# Patient Record
Sex: MALE | Race: WHITE | NOT HISPANIC OR LATINO | Employment: OTHER | URBAN - METROPOLITAN AREA
[De-identification: names, ages, dates, MRNs, and addresses within clinical notes are randomized per-mention and may not be internally consistent; named-entity substitution may affect disease eponyms.]

---

## 2024-01-02 ENCOUNTER — HOSPITAL ENCOUNTER (INPATIENT)
Facility: HOSPITAL | Age: 89
LOS: 9 days | Discharge: HOSPICE | DRG: 698 | End: 2024-01-11
Attending: EMERGENCY MEDICINE | Admitting: EMERGENCY MEDICINE
Payer: MEDICARE

## 2024-01-02 DIAGNOSIS — J13 PNEUMONIA OF RIGHT LUNG DUE TO STREPTOCOCCUS PNEUMONIAE, UNSPECIFIED PART OF LUNG: ICD-10-CM

## 2024-01-02 DIAGNOSIS — R78.81 BACTEREMIA: ICD-10-CM

## 2024-01-02 DIAGNOSIS — R06.02 SOB (SHORTNESS OF BREATH): ICD-10-CM

## 2024-01-02 DIAGNOSIS — J90 PLEURAL EFFUSION: ICD-10-CM

## 2024-01-02 DIAGNOSIS — R78.81 BACTEREMIA DUE TO STREPTOCOCCUS PNEUMONIAE: ICD-10-CM

## 2024-01-02 DIAGNOSIS — I25.10 CAD (CORONARY ARTERY DISEASE): ICD-10-CM

## 2024-01-02 DIAGNOSIS — R07.9 CHEST PAIN: ICD-10-CM

## 2024-01-02 DIAGNOSIS — R57.9 SHOCK: Primary | ICD-10-CM

## 2024-01-02 DIAGNOSIS — Z91.89 AT HIGH RISK FOR FLUID OVERLOAD: ICD-10-CM

## 2024-01-02 DIAGNOSIS — B95.3 BACTEREMIA DUE TO STREPTOCOCCUS PNEUMONIAE: ICD-10-CM

## 2024-01-02 DIAGNOSIS — I50.9 ACUTE ON CHRONIC CONGESTIVE HEART FAILURE, UNSPECIFIED HEART FAILURE TYPE: ICD-10-CM

## 2024-01-02 DIAGNOSIS — R07.9 CHEST PAIN, UNSPECIFIED TYPE: ICD-10-CM

## 2024-01-02 DIAGNOSIS — J96.01 ACUTE RESPIRATORY FAILURE WITH HYPOXIA: ICD-10-CM

## 2024-01-02 DIAGNOSIS — R41.82 ALTERED MENTAL STATE: ICD-10-CM

## 2024-01-02 DIAGNOSIS — I50.9 ACUTE EXACERBATION OF CHF (CONGESTIVE HEART FAILURE): ICD-10-CM

## 2024-01-02 DIAGNOSIS — R00.0 TACHYCARDIA: ICD-10-CM

## 2024-01-02 DIAGNOSIS — I48.91 ATRIAL FIBRILLATION: ICD-10-CM

## 2024-01-02 DIAGNOSIS — J44.9 CHRONIC OBSTRUCTIVE PULMONARY DISEASE, UNSPECIFIED COPD TYPE: ICD-10-CM

## 2024-01-02 LAB
ABORH RETYPE: NORMAL
ABS NEUT (OLG): 10.56 X10(3)/MCL (ref 2.1–9.2)
ABS NEUT (OLG): 5.75 X10(3)/MCL (ref 2.1–9.2)
ACINETOBACTER CALCOACETICUS-BAUMANNII COMPLEX (OHS): NOT DETECTED
ALBUMIN SERPL-MCNC: 2.2 G/DL (ref 3.4–4.8)
ALBUMIN SERPL-MCNC: 2.2 G/DL (ref 3.4–4.8)
ALBUMIN SERPL-MCNC: 2.5 G/DL (ref 3.4–4.8)
ALBUMIN/GLOB SERPL: 0.7 RATIO (ref 1.1–2)
ALBUMIN/GLOB SERPL: 0.8 RATIO (ref 1.1–2)
ALLENS TEST BLOOD GAS (OHS): YES
ALLENS TEST BLOOD GAS (OHS): YES
ALP SERPL-CCNC: 34 UNIT/L (ref 40–150)
ALP SERPL-CCNC: 36 UNIT/L (ref 40–150)
ALT SERPL-CCNC: 20 UNIT/L (ref 0–55)
ALT SERPL-CCNC: 21 UNIT/L (ref 0–55)
APPEARANCE UR: ABNORMAL
APTT PPP: 35.8 SECONDS (ref 23.2–33.7)
AST SERPL-CCNC: 33 UNIT/L (ref 5–34)
AST SERPL-CCNC: 37 UNIT/L (ref 5–34)
AV INDEX (PROSTH): 0.39
AV MEAN GRADIENT: 18 MMHG
AV PEAK GRADIENT: 28 MMHG
AV VALVE AREA BY VELOCITY RATIO: 1.22 CM²
AV VALVE AREA: 1.23 CM²
AV VELOCITY RATIO: 0.39
BACTERIA #/AREA URNS AUTO: ABNORMAL /HPF
BACTEROIDES FRAGILIS (OHS): NOT DETECTED
BASE EXCESS BLD CALC-SCNC: -7.6 MMOL/L (ref -2–2)
BASE EXCESS BLD CALC-SCNC: -9.7 MMOL/L (ref -2–2)
BASOPHILS NFR BLD MANUAL: 0.14 X10(3)/MCL (ref 0–0.2)
BASOPHILS NFR BLD MANUAL: 1 %
BILIRUB SERPL-MCNC: 0.4 MG/DL
BILIRUB SERPL-MCNC: 0.7 MG/DL
BILIRUB UR QL STRIP.AUTO: NEGATIVE
BLOOD GAS SAMPLE TYPE (OHS): ABNORMAL
BLOOD GAS SAMPLE TYPE (OHS): ABNORMAL
BNP BLD-MCNC: 1970.5 PG/ML
BSA FOR ECHO PROCEDURE: 1.88 M2
BUN SERPL-MCNC: 80.4 MG/DL (ref 8.4–25.7)
BUN SERPL-MCNC: 80.9 MG/DL (ref 8.4–25.7)
BUN SERPL-MCNC: 83.4 MG/DL (ref 8.4–25.7)
BURR CELLS (OLG): ABNORMAL
BURR CELLS (OLG): SLIGHT
C AURIS DNA BLD POS QL NAA+NON-PROBE: NOT DETECTED
C GATTII+NEOFOR DNA CSF QL NAA+NON-PROBE: NOT DETECTED
CA-I BLD-SCNC: 1.09 MMOL/L (ref 1.12–1.23)
CA-I BLD-SCNC: 1.17 MMOL/L (ref 1.12–1.23)
CALCIUM SERPL-MCNC: 8.1 MG/DL (ref 8.8–10)
CALCIUM SERPL-MCNC: 8.4 MG/DL (ref 8.8–10)
CALCIUM SERPL-MCNC: 8.5 MG/DL (ref 8.8–10)
CANDIDA ALBICANS (OHS): NOT DETECTED
CANDIDA GLABRATA (OHS): NOT DETECTED
CANDIDA KRUSEI (OHS): NOT DETECTED
CANDIDA PARAPSILOSIS (OHS): NOT DETECTED
CANDIDA TROPICALIS (OHS): NOT DETECTED
CHLORIDE SERPL-SCNC: 113 MMOL/L (ref 98–111)
CHLORIDE SERPL-SCNC: 114 MMOL/L (ref 98–111)
CHLORIDE SERPL-SCNC: 115 MMOL/L (ref 98–111)
CO2 BLDA-SCNC: 16.7 MMOL/L
CO2 BLDA-SCNC: 18.3 MMOL/L
CO2 SERPL-SCNC: 12 MMOL/L (ref 23–31)
CO2 SERPL-SCNC: 14 MMOL/L (ref 23–31)
CO2 SERPL-SCNC: 16 MMOL/L (ref 23–31)
COHGB MFR BLDA: 0.5 % (ref 0.5–1.5)
COHGB MFR BLDA: 2.7 % (ref 0.5–1.5)
COLOR UR AUTO: YELLOW
CREAT SERPL-MCNC: 2.65 MG/DL (ref 0.73–1.18)
CREAT SERPL-MCNC: 2.83 MG/DL (ref 0.73–1.18)
CREAT SERPL-MCNC: 3.1 MG/DL (ref 0.73–1.18)
CTX-M (OHS): ABNORMAL
CV ECHO LV RWT: 0.5 CM
DOP CALC AO PEAK VEL: 2.63 M/S
DOP CALC AO VTI: 53.2 CM
DOP CALC LVOT AREA: 3.1 CM2
DOP CALC LVOT DIAMETER: 2 CM
DOP CALC LVOT PEAK VEL: 1.02 M/S
DOP CALC LVOT STROKE VOLUME: 65.31 CM3
DOP CALC MV VTI: 35.8 CM
DOP CALCLVOT PEAK VEL VTI: 20.8 CM
DRAWN BY BLOOD GAS (OHS): ABNORMAL
DRAWN BY BLOOD GAS (OHS): ABNORMAL
E WAVE DECELERATION TIME: 301 MSEC
E/A RATIO: 0.87
E/E' RATIO: 16.82 M/S
ECHO LV POSTERIOR WALL: 1.07 CM (ref 0.6–1.1)
ENTEROBACTER CLOACAE COMPLEX (OHS): NOT DETECTED
ENTEROBACTERALES (OHS): NOT DETECTED
ENTEROCOCCUS FAECALIS (OHS): NOT DETECTED
ENTEROCOCCUS FAECIUM (OHS): NOT DETECTED
ERYTHROCYTE [DISTWIDTH] IN BLOOD BY AUTOMATED COUNT: 16 % (ref 11.5–17)
ERYTHROCYTE [DISTWIDTH] IN BLOOD BY AUTOMATED COUNT: 16.1 % (ref 11.5–17)
ESCHERICHIA COLI (OHS): NOT DETECTED
FLUAV AG UPPER RESP QL IA.RAPID: NOT DETECTED
FLUBV AG UPPER RESP QL IA.RAPID: NOT DETECTED
FRACTIONAL SHORTENING: 34 % (ref 28–44)
GFR SERPLBLD CREATININE-BSD FMLA CKD-EPI: 18 MLS/MIN/1.73/M2
GFR SERPLBLD CREATININE-BSD FMLA CKD-EPI: 20 MLS/MIN/1.73/M2
GFR SERPLBLD CREATININE-BSD FMLA CKD-EPI: 22 MLS/MIN/1.73/M2
GLOBULIN SER-MCNC: 3 GM/DL (ref 2.4–3.5)
GLOBULIN SER-MCNC: 3.2 GM/DL (ref 2.4–3.5)
GLUCOSE SERPL-MCNC: 113 MG/DL (ref 75–121)
GLUCOSE SERPL-MCNC: 73 MG/DL (ref 75–121)
GLUCOSE SERPL-MCNC: 84 MG/DL (ref 75–121)
GLUCOSE UR QL STRIP.AUTO: NORMAL
GP B STREP DNA CSF QL NAA+NON-PROBE: NOT DETECTED
GROUP & RH: NORMAL
HAEM INFLU DNA CSF QL NAA+NON-PROBE: NOT DETECTED
HCO3 BLDA-SCNC: 15.7 MMOL/L (ref 22–26)
HCO3 BLDA-SCNC: 17.3 MMOL/L (ref 22–26)
HCT VFR BLD AUTO: 36 % (ref 42–52)
HCT VFR BLD AUTO: 37.2 % (ref 42–52)
HCT VFR BLD AUTO: 37.4 % (ref 42–52)
HEMATOLOGIST REVIEW: NORMAL
HGB BLD-MCNC: 11 G/DL (ref 14–18)
HGB BLD-MCNC: 11.4 G/DL (ref 14–18)
HGB BLD-MCNC: 11.5 G/DL (ref 14–18)
IMP (OHS): ABNORMAL
INDIRECT COOMBS: NORMAL
INR PPP: 1.8
INSTRUMENT WBC (OLG): 13.89 X10(3)/MCL
INSTRUMENT WBC (OLG): 7.77 X10(3)/MCL
INTERVENTRICULAR SEPTUM: 1.04 CM (ref 0.6–1.1)
KETONES UR QL STRIP.AUTO: NEGATIVE
KLEBSIELLA AEROGENES (OHS): NOT DETECTED
KLEBSIELLA OXYTOCA (OHS): NOT DETECTED
KLEBSIELLA PNEUMONIAE GROUP (OHS): NOT DETECTED
KPC (OHS): ABNORMAL
L MONOCYTOG DNA CSF QL NAA+NON-PROBE: NOT DETECTED
LACTATE SERPL-SCNC: 2.9 MMOL/L (ref 0.5–2.2)
LACTATE SERPL-SCNC: 3.3 MMOL/L (ref 0.5–2.2)
LACTATE SERPL-SCNC: 3.6 MMOL/L (ref 0.5–2.2)
LACTATE SERPL-SCNC: 4.1 MMOL/L (ref 0.5–2.2)
LEFT ATRIUM SIZE: 3.7 CM
LEFT ATRIUM VOLUME INDEX MOD: 30.9 ML/M2
LEFT ATRIUM VOLUME MOD: 57.2 CM3
LEFT INTERNAL DIMENSION IN SYSTOLE: 2.82 CM (ref 2.1–4)
LEFT VENTRICLE DIASTOLIC VOLUME INDEX: 44.92 ML/M2
LEFT VENTRICLE DIASTOLIC VOLUME: 83.1 ML
LEFT VENTRICLE MASS INDEX: 83 G/M2
LEFT VENTRICLE SYSTOLIC VOLUME INDEX: 16.3 ML/M2
LEFT VENTRICLE SYSTOLIC VOLUME: 30.1 ML
LEFT VENTRICULAR INTERNAL DIMENSION IN DIASTOLE: 4.3 CM (ref 3.5–6)
LEFT VENTRICULAR MASS: 153.58 G
LEUKOCYTE ESTERASE UR QL STRIP.AUTO: 500
LPM (OHS): 10
LPM (OHS): 3.5
LV LATERAL E/E' RATIO: 15.89 M/S
LV SEPTAL E/E' RATIO: 17.88 M/S
LVOT MG: 2 MMHG
LVOT MV: 0.74 CM/S
LYMPHOCYTES NFR BLD MANUAL: 0.7 X10(3)/MCL
LYMPHOCYTES NFR BLD MANUAL: 1.11 X10(3)/MCL
LYMPHOCYTES NFR BLD MANUAL: 8 %
LYMPHOCYTES NFR BLD MANUAL: 9 %
MAGNESIUM SERPL-MCNC: 1.5 MG/DL (ref 1.6–2.6)
MAGNESIUM SERPL-MCNC: 2 MG/DL (ref 1.6–2.6)
MCH RBC QN AUTO: 27.2 PG (ref 27–31)
MCH RBC QN AUTO: 27.2 PG (ref 27–31)
MCHC RBC AUTO-ENTMCNC: 30.5 G/DL (ref 33–36)
MCHC RBC AUTO-ENTMCNC: 30.6 G/DL (ref 33–36)
MCR-1 (OHS): ABNORMAL
MCV RBC AUTO: 89.1 FL (ref 80–94)
MCV RBC AUTO: 89.3 FL (ref 80–94)
MECA/C (OHS): ABNORMAL
MECA/C AND MREJ (MRSA)(OHS): ABNORMAL
METAMYELOCYTES NFR BLD MANUAL: 16 %
METAMYELOCYTES NFR BLD MANUAL: 7 %
METHGB MFR BLDA: 0 % (ref 0.4–1.5)
METHGB MFR BLDA: 0 % (ref 0.4–1.5)
MRSA PCR SCRN (OHS): NOT DETECTED
MUCOUS THREADS URNS QL MICRO: ABNORMAL /LPF
MV MEAN GRADIENT: 6 MMHG
MV PEAK A VEL: 1.64 M/S
MV PEAK E VEL: 1.43 M/S
MV PEAK GRADIENT: 12 MMHG
MV VALVE AREA BY CONTINUITY EQUATION: 1.82 CM2
MYELOCYTES NFR BLD MANUAL: 1 %
MYELOCYTES NFR BLD MANUAL: 7 %
N MEN DNA CSF QL NAA+NON-PROBE: NOT DETECTED
NDM (OHS): ABNORMAL
NEUTROPHILS NFR BLD MANUAL: 74 %
NEUTROPHILS NFR BLD MANUAL: 76 %
NITRITE UR QL STRIP.AUTO: NEGATIVE
NRBC BLD AUTO-RTO: 0 %
NRBC BLD AUTO-RTO: 0.5 %
O2 HB BLOOD GAS (OHS): 91 % (ref 94–97)
O2 HB BLOOD GAS (OHS): 91.3 % (ref 94–97)
OHS LV EJECTION FRACTION SIMPSONS BIPLANE MOD: 60 %
OXA-48-LIKE (OHS): ABNORMAL
OXYGEN DEVICE BLOOD GAS (OHS): ABNORMAL
OXYGEN DEVICE BLOOD GAS (OHS): ABNORMAL
OXYHGB MFR BLDA: 11.1 G/DL (ref 12–16)
OXYHGB MFR BLDA: 9.7 G/DL (ref 12–16)
PCO2 BLDA: 32 MMHG (ref 35–45)
PCO2 BLDA: 32 MMHG (ref 35–45)
PH BLDA: 7.3 [PH] (ref 7.35–7.45)
PH BLDA: 7.34 [PH] (ref 7.35–7.45)
PH UR STRIP.AUTO: 5 [PH]
PHOSPHATE SERPL-MCNC: 3.4 MG/DL (ref 2.3–4.7)
PHOSPHATE SERPL-MCNC: 3.6 MG/DL (ref 2.3–4.7)
PISA TR MAX VEL: 3.08 M/S
PLATELET # BLD AUTO: 167 X10(3)/MCL (ref 130–400)
PLATELET # BLD AUTO: 194 X10(3)/MCL (ref 130–400)
PLATELET # BLD EST: NORMAL 10*3/UL
PLATELET # BLD EST: NORMAL 10*3/UL
PMV BLD AUTO: 11.4 FL (ref 7.4–10.4)
PMV BLD AUTO: 11.6 FL (ref 7.4–10.4)
PO2 BLDA: 58 MMHG (ref 80–100)
PO2 BLDA: 63 MMHG (ref 80–100)
POIKILOCYTOSIS BLD QL SMEAR: ABNORMAL
POIKILOCYTOSIS BLD QL SMEAR: SLIGHT
POTASSIUM BLOOD GAS (OHS): 4.9 MMOL/L (ref 3.5–5)
POTASSIUM BLOOD GAS (OHS): 5.2 MMOL/L (ref 3.5–5)
POTASSIUM SERPL-SCNC: 4.9 MMOL/L (ref 3.5–5.1)
POTASSIUM SERPL-SCNC: 5.5 MMOL/L (ref 3.5–5.1)
POTASSIUM SERPL-SCNC: 5.7 MMOL/L (ref 3.5–5.1)
PROT SERPL-MCNC: 5.2 GM/DL (ref 5.8–7.6)
PROT SERPL-MCNC: 5.7 GM/DL (ref 5.8–7.6)
PROT UR QL STRIP.AUTO: ABNORMAL
PROTEUS SPP. (OHS): NOT DETECTED
PROTHROMBIN TIME: 20.7 SECONDS (ref 12.5–14.5)
PSEUDOMONAS AERUGINOSA (OHS): NOT DETECTED
RBC # BLD AUTO: 4.04 X10(6)/MCL (ref 4.7–6.1)
RBC # BLD AUTO: 4.19 X10(6)/MCL (ref 4.7–6.1)
RBC #/AREA URNS AUTO: ABNORMAL /HPF
RBC MORPH BLD: ABNORMAL
RBC MORPH BLD: ABNORMAL
RBC UR QL AUTO: ABNORMAL
RIGHT VENTRICULAR END-DIASTOLIC DIMENSION: 3.27 CM
RSV A 5' UTR RNA NPH QL NAA+PROBE: NOT DETECTED
S ENT+BONG DNA STL QL NAA+NON-PROBE: NOT DETECTED
S PNEUM DNA CSF QL NAA+NON-PROBE: DETECTED
SAMPLE SITE BLOOD GAS (OHS): ABNORMAL
SAMPLE SITE BLOOD GAS (OHS): ABNORMAL
SAO2 % BLDA: 87.8 %
SAO2 % BLDA: 89.1 %
SARS-COV-2 RNA RESP QL NAA+PROBE: NOT DETECTED
SERRATIA MARCESCENS (OHS): NOT DETECTED
SODIUM BLOOD GAS (OHS): 135 MMOL/L (ref 137–145)
SODIUM BLOOD GAS (OHS): 136 MMOL/L (ref 137–145)
SODIUM SERPL-SCNC: 141 MMOL/L (ref 132–146)
SODIUM SERPL-SCNC: 142 MMOL/L (ref 132–146)
SODIUM SERPL-SCNC: 143 MMOL/L (ref 132–146)
SP GR UR STRIP.AUTO: 1.02 (ref 1–1.03)
SPECIMEN OUTDATE: NORMAL
SQUAMOUS #/AREA URNS LPF: ABNORMAL /HPF
STAPHYLOCOCCUS AUREUS (OHS): NOT DETECTED
STAPHYLOCOCCUS EPIDERMIDIS (OHS): NOT DETECTED
STAPHYLOCOCCUS LUGDUNENSIS (OHS): NOT DETECTED
STAPHYLOCOCCUS SPP. (OHS): NOT DETECTED
STENOTROPHOMONAS MALTOPHILIA (OHS): NOT DETECTED
STREPTOCOCCUS PYOGENES (GROUP A)(OHS): NOT DETECTED
STREPTOCOCCUS SPP. (OHS): DETECTED
TDI LATERAL: 0.09 M/S
TDI SEPTAL: 0.08 M/S
TDI: 0.09 M/S
TR MAX PG: 38 MMHG
TRICUSPID ANNULAR PLANE SYSTOLIC EXCURSION: 2.75 CM
TROPONIN I SERPL-MCNC: 0.24 NG/ML (ref 0–0.04)
TROPONIN I SERPL-MCNC: 0.25 NG/ML (ref 0–0.04)
TROPONIN I SERPL-MCNC: 0.29 NG/ML (ref 0–0.04)
UROBILINOGEN UR STRIP-ACNC: NORMAL
VANA/B (OHS): ABNORMAL
VIM (OHS): ABNORMAL
WBC # SPEC AUTO: 13.89 X10(3)/MCL (ref 4.5–11.5)
WBC # SPEC AUTO: 7.77 X10(3)/MCL (ref 4.5–11.5)
WBC #/AREA URNS AUTO: ABNORMAL /HPF
WBC CLUMPS UR QL AUTO: ABNORMAL
Z-SCORE OF LEFT VENTRICULAR DIMENSION IN END DIASTOLE: -1.78
Z-SCORE OF LEFT VENTRICULAR DIMENSION IN END SYSTOLE: -0.93

## 2024-01-02 PROCEDURE — 83605 ASSAY OF LACTIC ACID: CPT

## 2024-01-02 PROCEDURE — 25000003 PHARM REV CODE 250: Performed by: EMERGENCY MEDICINE

## 2024-01-02 PROCEDURE — 36600 WITHDRAWAL OF ARTERIAL BLOOD: CPT

## 2024-01-02 PROCEDURE — 84100 ASSAY OF PHOSPHORUS: CPT | Performed by: STUDENT IN AN ORGANIZED HEALTH CARE EDUCATION/TRAINING PROGRAM

## 2024-01-02 PROCEDURE — 99900035 HC TECH TIME PER 15 MIN (STAT)

## 2024-01-02 PROCEDURE — 83735 ASSAY OF MAGNESIUM: CPT | Performed by: STUDENT IN AN ORGANIZED HEALTH CARE EDUCATION/TRAINING PROGRAM

## 2024-01-02 PROCEDURE — 85027 COMPLETE CBC AUTOMATED: CPT

## 2024-01-02 PROCEDURE — 94640 AIRWAY INHALATION TREATMENT: CPT

## 2024-01-02 PROCEDURE — 63600175 PHARM REV CODE 636 W HCPCS: Performed by: NURSE PRACTITIONER

## 2024-01-02 PROCEDURE — 25000003 PHARM REV CODE 250: Performed by: INTERNAL MEDICINE

## 2024-01-02 PROCEDURE — 25000003 PHARM REV CODE 250: Performed by: STUDENT IN AN ORGANIZED HEALTH CARE EDUCATION/TRAINING PROGRAM

## 2024-01-02 PROCEDURE — 86850 RBC ANTIBODY SCREEN: CPT

## 2024-01-02 PROCEDURE — 83735 ASSAY OF MAGNESIUM: CPT

## 2024-01-02 PROCEDURE — 27000221 HC OXYGEN, UP TO 24 HOURS

## 2024-01-02 PROCEDURE — 87077 CULTURE AEROBIC IDENTIFY: CPT

## 2024-01-02 PROCEDURE — 83880 ASSAY OF NATRIURETIC PEPTIDE: CPT | Performed by: EMERGENCY MEDICINE

## 2024-01-02 PROCEDURE — 84484 ASSAY OF TROPONIN QUANT: CPT

## 2024-01-02 PROCEDURE — 85027 COMPLETE CBC AUTOMATED: CPT | Performed by: STUDENT IN AN ORGANIZED HEALTH CARE EDUCATION/TRAINING PROGRAM

## 2024-01-02 PROCEDURE — 93005 ELECTROCARDIOGRAM TRACING: CPT | Performed by: INTERNAL MEDICINE

## 2024-01-02 PROCEDURE — 99285 EMERGENCY DEPT VISIT HI MDM: CPT | Mod: 25

## 2024-01-02 PROCEDURE — 25000242 PHARM REV CODE 250 ALT 637 W/ HCPCS

## 2024-01-02 PROCEDURE — 85007 BL SMEAR W/DIFF WBC COUNT: CPT | Performed by: STUDENT IN AN ORGANIZED HEALTH CARE EDUCATION/TRAINING PROGRAM

## 2024-01-02 PROCEDURE — 87086 URINE CULTURE/COLONY COUNT: CPT

## 2024-01-02 PROCEDURE — 63600175 PHARM REV CODE 636 W HCPCS: Performed by: INTERNAL MEDICINE

## 2024-01-02 PROCEDURE — 85730 THROMBOPLASTIN TIME PARTIAL: CPT

## 2024-01-02 PROCEDURE — 85610 PROTHROMBIN TIME: CPT

## 2024-01-02 PROCEDURE — 80053 COMPREHEN METABOLIC PANEL: CPT | Performed by: STUDENT IN AN ORGANIZED HEALTH CARE EDUCATION/TRAINING PROGRAM

## 2024-01-02 PROCEDURE — 25000003 PHARM REV CODE 250

## 2024-01-02 PROCEDURE — 93010 ELECTROCARDIOGRAM REPORT: CPT | Mod: ,,, | Performed by: INTERNAL MEDICINE

## 2024-01-02 PROCEDURE — 87154 CUL TYP ID BLD PTHGN 6+ TRGT: CPT

## 2024-01-02 PROCEDURE — 25000242 PHARM REV CODE 250 ALT 637 W/ HCPCS: Performed by: EMERGENCY MEDICINE

## 2024-01-02 PROCEDURE — 25000003 PHARM REV CODE 250: Performed by: NURSE PRACTITIONER

## 2024-01-02 PROCEDURE — 96374 THER/PROPH/DIAG INJ IV PUSH: CPT

## 2024-01-02 PROCEDURE — 80069 RENAL FUNCTION PANEL: CPT | Performed by: STUDENT IN AN ORGANIZED HEALTH CARE EDUCATION/TRAINING PROGRAM

## 2024-01-02 PROCEDURE — 96375 TX/PRO/DX INJ NEW DRUG ADDON: CPT

## 2024-01-02 PROCEDURE — 82803 BLOOD GASES ANY COMBINATION: CPT

## 2024-01-02 PROCEDURE — 85018 HEMOGLOBIN: CPT | Performed by: STUDENT IN AN ORGANIZED HEALTH CARE EDUCATION/TRAINING PROGRAM

## 2024-01-02 PROCEDURE — 83605 ASSAY OF LACTIC ACID: CPT | Performed by: STUDENT IN AN ORGANIZED HEALTH CARE EDUCATION/TRAINING PROGRAM

## 2024-01-02 PROCEDURE — 84484 ASSAY OF TROPONIN QUANT: CPT | Performed by: NURSE PRACTITIONER

## 2024-01-02 PROCEDURE — 87641 MR-STAPH DNA AMP PROBE: CPT | Performed by: INTERNAL MEDICINE

## 2024-01-02 PROCEDURE — 81001 URINALYSIS AUTO W/SCOPE: CPT

## 2024-01-02 PROCEDURE — 25000003 PHARM REV CODE 250: Performed by: PHYSICIAN ASSISTANT

## 2024-01-02 PROCEDURE — 96361 HYDRATE IV INFUSION ADD-ON: CPT

## 2024-01-02 PROCEDURE — 63600175 PHARM REV CODE 636 W HCPCS

## 2024-01-02 PROCEDURE — 80053 COMPREHEN METABOLIC PANEL: CPT

## 2024-01-02 PROCEDURE — 20000000 HC ICU ROOM

## 2024-01-02 PROCEDURE — 63600175 PHARM REV CODE 636 W HCPCS: Performed by: STUDENT IN AN ORGANIZED HEALTH CARE EDUCATION/TRAINING PROGRAM

## 2024-01-02 PROCEDURE — 93005 ELECTROCARDIOGRAM TRACING: CPT

## 2024-01-02 PROCEDURE — 0241U COVID/RSV/FLU A&B PCR: CPT | Performed by: NURSE PRACTITIONER

## 2024-01-02 RX ORDER — LIDOCAINE HYDROCHLORIDE 10 MG/ML
5 INJECTION INFILTRATION; PERINEURAL ONCE
Status: DISCONTINUED | OUTPATIENT
Start: 2024-01-02 | End: 2024-01-02

## 2024-01-02 RX ORDER — ATORVASTATIN CALCIUM 10 MG/1
10 TABLET, FILM COATED ORAL DAILY
Status: ON HOLD | COMMUNITY
End: 2024-01-11 | Stop reason: HOSPADM

## 2024-01-02 RX ORDER — PROCHLORPERAZINE EDISYLATE 5 MG/ML
5 INJECTION INTRAMUSCULAR; INTRAVENOUS EVERY 6 HOURS PRN
Status: DISCONTINUED | OUTPATIENT
Start: 2024-01-02 | End: 2024-01-11 | Stop reason: HOSPADM

## 2024-01-02 RX ORDER — SODIUM CHLORIDE 9 MG/ML
INJECTION, SOLUTION INTRAVENOUS CONTINUOUS
Status: DISCONTINUED | OUTPATIENT
Start: 2024-01-02 | End: 2024-01-11 | Stop reason: HOSPADM

## 2024-01-02 RX ORDER — CALCITRIOL 0.25 UG/1
0.25 CAPSULE ORAL DAILY
COMMUNITY

## 2024-01-02 RX ORDER — ACETAMINOPHEN 500 MG
1000 TABLET ORAL EVERY 6 HOURS PRN
Status: DISCONTINUED | OUTPATIENT
Start: 2024-01-02 | End: 2024-01-11 | Stop reason: HOSPADM

## 2024-01-02 RX ORDER — NALOXONE HCL 0.4 MG/ML
0.02 VIAL (ML) INJECTION
Status: DISCONTINUED | OUTPATIENT
Start: 2024-01-02 | End: 2024-01-11 | Stop reason: HOSPADM

## 2024-01-02 RX ORDER — GABAPENTIN 100 MG/1
300 CAPSULE ORAL NIGHTLY
COMMUNITY

## 2024-01-02 RX ORDER — ALBUTEROL SULFATE 0.83 MG/ML
10 SOLUTION RESPIRATORY (INHALATION) CONTINUOUS
Status: DISCONTINUED | OUTPATIENT
Start: 2024-01-02 | End: 2024-01-11 | Stop reason: HOSPADM

## 2024-01-02 RX ORDER — IPRATROPIUM BROMIDE AND ALBUTEROL SULFATE 2.5; .5 MG/3ML; MG/3ML
3 SOLUTION RESPIRATORY (INHALATION)
Status: COMPLETED | OUTPATIENT
Start: 2024-01-02 | End: 2024-01-02

## 2024-01-02 RX ORDER — SODIUM CHLORIDE 9 MG/ML
INJECTION, SOLUTION INTRAVENOUS CONTINUOUS
Status: DISCONTINUED | OUTPATIENT
Start: 2024-01-02 | End: 2024-01-02

## 2024-01-02 RX ORDER — METOPROLOL TARTRATE 25 MG/1
25 TABLET, FILM COATED ORAL 2 TIMES DAILY
Status: DISCONTINUED | OUTPATIENT
Start: 2024-01-02 | End: 2024-01-03

## 2024-01-02 RX ORDER — NOREPINEPHRINE BITARTRATE/D5W 8 MG/250ML
0-3 PLASTIC BAG, INJECTION (ML) INTRAVENOUS CONTINUOUS
Status: DISCONTINUED | OUTPATIENT
Start: 2024-01-02 | End: 2024-01-11 | Stop reason: HOSPADM

## 2024-01-02 RX ORDER — SODIUM CHLORIDE 0.9 % (FLUSH) 0.9 %
10 SYRINGE (ML) INJECTION
Status: DISCONTINUED | OUTPATIENT
Start: 2024-01-02 | End: 2024-01-11 | Stop reason: HOSPADM

## 2024-01-02 RX ORDER — TALC
6 POWDER (GRAM) TOPICAL NIGHTLY PRN
Status: DISCONTINUED | OUTPATIENT
Start: 2024-01-02 | End: 2024-01-11 | Stop reason: HOSPADM

## 2024-01-02 RX ORDER — NOREPINEPHRINE BITARTRATE/D5W 8 MG/250ML
PLASTIC BAG, INJECTION (ML) INTRAVENOUS
Status: DISPENSED
Start: 2024-01-02 | End: 2024-01-03

## 2024-01-02 RX ORDER — INDOMETHACIN 25 MG/1
50 CAPSULE ORAL
Status: COMPLETED | OUTPATIENT
Start: 2024-01-02 | End: 2024-01-02

## 2024-01-02 RX ORDER — METOPROLOL TARTRATE 25 MG/1
25 TABLET, FILM COATED ORAL 2 TIMES DAILY
COMMUNITY

## 2024-01-02 RX ORDER — LIDOCAINE HYDROCHLORIDE 10 MG/ML
5 INJECTION INFILTRATION; PERINEURAL ONCE
Status: COMPLETED | OUTPATIENT
Start: 2024-01-02 | End: 2024-01-02

## 2024-01-02 RX ORDER — IPRATROPIUM BROMIDE AND ALBUTEROL SULFATE 2.5; .5 MG/3ML; MG/3ML
3 SOLUTION RESPIRATORY (INHALATION) EVERY 4 HOURS PRN
Status: DISCONTINUED | OUTPATIENT
Start: 2024-01-02 | End: 2024-01-11 | Stop reason: HOSPADM

## 2024-01-02 RX ORDER — FUROSEMIDE 10 MG/ML
40 INJECTION INTRAMUSCULAR; INTRAVENOUS ONCE
Status: COMPLETED | OUTPATIENT
Start: 2024-01-02 | End: 2024-01-02

## 2024-01-02 RX ORDER — MUPIROCIN 20 MG/G
OINTMENT TOPICAL 2 TIMES DAILY
Status: COMPLETED | OUTPATIENT
Start: 2024-01-02 | End: 2024-01-07

## 2024-01-02 RX ORDER — MAGNESIUM SULFATE HEPTAHYDRATE 40 MG/ML
2 INJECTION, SOLUTION INTRAVENOUS
Status: COMPLETED | OUTPATIENT
Start: 2024-01-02 | End: 2024-01-02

## 2024-01-02 RX ORDER — SIMETHICONE 80 MG
1 TABLET,CHEWABLE ORAL 4 TIMES DAILY PRN
Status: DISCONTINUED | OUTPATIENT
Start: 2024-01-02 | End: 2024-01-11 | Stop reason: HOSPADM

## 2024-01-02 RX ORDER — CALCITRIOL 0.25 UG/1
0.25 CAPSULE ORAL DAILY
Status: DISCONTINUED | OUTPATIENT
Start: 2024-01-03 | End: 2024-01-06

## 2024-01-02 RX ORDER — GABAPENTIN 300 MG/1
300 CAPSULE ORAL NIGHTLY
Status: DISCONTINUED | OUTPATIENT
Start: 2024-01-02 | End: 2024-01-11 | Stop reason: HOSPADM

## 2024-01-02 RX ORDER — ACETAMINOPHEN 325 MG/1
650 TABLET ORAL EVERY 4 HOURS PRN
Status: DISCONTINUED | OUTPATIENT
Start: 2024-01-02 | End: 2024-01-11 | Stop reason: HOSPADM

## 2024-01-02 RX ORDER — ATORVASTATIN CALCIUM 10 MG/1
10 TABLET, FILM COATED ORAL DAILY
Status: DISCONTINUED | OUTPATIENT
Start: 2024-01-03 | End: 2024-01-11 | Stop reason: HOSPADM

## 2024-01-02 RX ORDER — HEPARIN SODIUM 5000 [USP'U]/ML
5000 INJECTION, SOLUTION INTRAVENOUS; SUBCUTANEOUS EVERY 8 HOURS
Status: DISCONTINUED | OUTPATIENT
Start: 2024-01-02 | End: 2024-01-03

## 2024-01-02 RX ORDER — SODIUM CHLORIDE 9 MG/ML
1000 INJECTION, SOLUTION INTRAVENOUS
Status: DISCONTINUED | OUTPATIENT
Start: 2024-01-02 | End: 2024-01-02

## 2024-01-02 RX ORDER — ALBUTEROL SULFATE 0.83 MG/ML
2.5 SOLUTION RESPIRATORY (INHALATION) EVERY 4 HOURS PRN
Status: DISCONTINUED | OUTPATIENT
Start: 2024-01-02 | End: 2024-01-02

## 2024-01-02 RX ORDER — ONDANSETRON HYDROCHLORIDE 2 MG/ML
4 INJECTION, SOLUTION INTRAVENOUS EVERY 4 HOURS PRN
Status: DISCONTINUED | OUTPATIENT
Start: 2024-01-02 | End: 2024-01-11 | Stop reason: HOSPADM

## 2024-01-02 RX ADMIN — CEFEPIME 500 MG: 1 INJECTION, POWDER, FOR SOLUTION INTRAMUSCULAR; INTRAVENOUS at 11:01

## 2024-01-02 RX ADMIN — HEPARIN SODIUM 5000 UNITS: 5000 INJECTION, SOLUTION INTRAVENOUS; SUBCUTANEOUS at 09:01

## 2024-01-02 RX ADMIN — SODIUM CHLORIDE 1000 ML: 9 INJECTION, SOLUTION INTRAVENOUS at 10:01

## 2024-01-02 RX ADMIN — ALBUTEROL SULFATE 10 MG/HR: 2.5 SOLUTION RESPIRATORY (INHALATION) at 12:01

## 2024-01-02 RX ADMIN — SODIUM CHLORIDE: 9 INJECTION, SOLUTION INTRAVENOUS at 11:01

## 2024-01-02 RX ADMIN — NOREPINEPHRINE BITARTRATE 0.02 MCG/KG/MIN: 8 INJECTION, SOLUTION INTRAVENOUS at 08:01

## 2024-01-02 RX ADMIN — SODIUM CHLORIDE 500 ML: 9 INJECTION, SOLUTION INTRAVENOUS at 12:01

## 2024-01-02 RX ADMIN — MAGNESIUM SULFATE HEPTAHYDRATE 2 G: 40 INJECTION, SOLUTION INTRAVENOUS at 03:01

## 2024-01-02 RX ADMIN — SODIUM CHLORIDE: 9 INJECTION, SOLUTION INTRAVENOUS at 07:01

## 2024-01-02 RX ADMIN — FUROSEMIDE 40 MG: 10 INJECTION, SOLUTION INTRAVENOUS at 10:01

## 2024-01-02 RX ADMIN — ACETAMINOPHEN 1000 MG: 500 TABLET ORAL at 04:01

## 2024-01-02 RX ADMIN — AZITHROMYCIN MONOHYDRATE 500 MG: 500 INJECTION, POWDER, LYOPHILIZED, FOR SOLUTION INTRAVENOUS at 03:01

## 2024-01-02 RX ADMIN — FUROSEMIDE 40 MG: 10 INJECTION, SOLUTION INTRAMUSCULAR; INTRAVENOUS at 05:01

## 2024-01-02 RX ADMIN — SODIUM BICARBONATE 50 MEQ: 84 INJECTION, SOLUTION INTRAVENOUS at 12:01

## 2024-01-02 RX ADMIN — VANCOMYCIN HYDROCHLORIDE 1500 MG: 1.5 INJECTION, POWDER, LYOPHILIZED, FOR SOLUTION INTRAVENOUS at 11:01

## 2024-01-02 RX ADMIN — IPRATROPIUM BROMIDE AND ALBUTEROL SULFATE 3 ML: .5; 3 SOLUTION RESPIRATORY (INHALATION) at 12:01

## 2024-01-02 RX ADMIN — MUPIROCIN: 20 OINTMENT TOPICAL at 11:01

## 2024-01-02 RX ADMIN — LIDOCAINE HYDROCHLORIDE 5 ML: 10 INJECTION, SOLUTION INFILTRATION; PERINEURAL at 10:01

## 2024-01-02 RX ADMIN — CEFTRIAXONE SODIUM 2 G: 2 INJECTION, POWDER, FOR SOLUTION INTRAMUSCULAR; INTRAVENOUS at 01:01

## 2024-01-02 NOTE — CONSULTS
Inpatient consult to Cardiology  Consult performed by: Jeri Dunlap FNP  Consult ordered by: Mendoza Mckeon MD [facility] - Emergency Dept    Cardiology  Consult Note    Patient Name: Francis Montero  MRN: [id number]  Admission Date: 1/2/2024  Hospital Length of Stay: 0 days  Code Status: Full Code   Attending Provider: Fadumo Ramirez DO   Consulting Provider: NADIR Mcneil  Primary Care Physician: No primary care provider on file.  Principal Problem:<principal problem not specified>    Patient information was obtained from past medical records and ER records.     Subjective:   Reason for consult: elevated troponin    HPI:   Mr. Montero is a 89 y/o male,  unknown to [facility], who presented to Ed with c/o SOB. Admit VS- BP 70/30, P RA sat 85%. He was given 200ml NS enroute. In Ed he was given another 1.5L NS bolus with improvement in BP. Lab significant for WBC 13.89, K+ 5.7, CO2 16, BUN/creatinine 83.4/3.10, Mg 1.50, BNP 1970, troponin 0.236, Venous lactate 3.3. EKG SR with PACs. CXR consistent with pulmonary congestion and a large bilateral pleural effusions. Echo revealing EF 60-65% with mild to  moderate AS, mild MS and mild to moderate TR. Blood and urine cultures obtained and patient started on antibiotics. [facility] has been consulted for elevated troponin      PMH: Afib/eliquis, COPD, asbestosis, Merkel cell carcinoma of left ear anxiety, esophagitis, esophageal varices, hiatal hernia, merkel cell carcinoma, prostate cancer, chronic UTIs, HLD, PUD  PSH: surgical resection with adjuvant radiotherapy to Left ear,  EGD, prostate surgery,  open shoulder rotator cuff repair  Family History:   Social History: former  smoker 40 pack years,     Previous Cardiac Diagnostics:   TTE 01.2.24:  Left Ventricle: The left ventricle is normal in size. Normal wall thickness. Normal wall motion. There is normal systolic function with a visually estimated ejection fraction of 60 - 65%. Diastolic  function cannot be reliably determined in the presence of mitral valve disease. Inconclusive left ventricular filling pressure.    Right Ventricle: Normal right ventricular cavity size. Systolic function is normal.    Aortic Valve: There is mild to moderate stenosis. Aortic valve area by VTI is 1.23 cm². Aortic valve peak velocity is 2.63 m/s. Mean gradient is 18 mmHg. The dimensionless index is 0.39. There is trace aortic regurgitation.    Mitral Valve: There is severe mitral annular calcification present. There is mild stenosis. The mean pressure gradient across the mitral valve is 6 mmHg at a heart rate of  bpm.    Tricuspid Valve: There is mild to moderate regurgitation. The estimated PA systolic pressure is at least 38 mmHg.    TTE 02.22.22:   Left Ventricle: Left ventricle is normal in size and function. Normal   wall thickness.     Right Ventricle: Right ventricle is normal in size and function. Normal   wall thickness.     Mitral Valve: Mitral valve is normal in structure and function. Mildly   calcified leaflets.     Tricuspid Valve: Tricuspid valve is normal size and function.     Aortic Valve: Aortic valve is normal in structure and function.   Moderately calcified cusps.       Review of patient's allergies indicates:  No Known Allergies    No current facility-administered medications on file prior to encounter.     Current Outpatient Medications on File Prior to Encounter   Medication Sig    atorvastatin (LIPITOR) 10 MG tablet Take 10 mg by mouth once daily.    calcitRIOL (ROCALTROL) 0.25 MCG Cap Take 0.25 mcg by mouth once daily. Takes every other day    gabapentin (NEURONTIN) 100 MG capsule Take 300 mg by mouth every evening.    metoprolol tartrate (LOPRESSOR) 25 MG tablet Take 25 mg by mouth 2 (two) times daily.    NON FORMULARY MEDICATION Take 850 mg by mouth Daily. Umary     Family History    None       Tobacco Use    Smoking status: Not on file    Smokeless tobacco: Not on file   Substance and  Sexual Activity    Alcohol use: Not on file    Drug use: Not on file    Sexual activity: Not on file       Review of Systems   Respiratory:  Positive for cough and shortness of breath.    Cardiovascular:  Negative for chest pain and palpitations.   Gastrointestinal: Negative.        Objective:     Vital Signs (Most Recent):  Temp: 98.2 °F (36.8 °C) (01/02/24 0930)  Pulse: 108 (01/02/24 1415)  Resp: (!) 26 (01/02/24 1415)  BP: 112/69 (01/02/24 1415)  SpO2: (!) 93 % (01/02/24 1415) Vital Signs (24h Range):  Temp:  [98.2 °F (36.8 °C)] 98.2 °F (36.8 °C)  Pulse:  [] 108  Resp:  [18-30] 26  SpO2:  [86 %-100 %] 93 %  BP: ()/(44-87) 112/69     Weight: 77.1 kg (170 lb)  Body mass index is 28.29 kg/m².    SpO2: (!) 93 %       No intake or output data in the 24 hours ending 01/02/24 1435    Lines/Drains/Airways       Peripheral Intravenous Line  Duration                  Peripheral IV - Single Lumen 01/02/24 1006 18 G Right Antecubital <1 day         Peripheral IV - Single Lumen 01/02/24 20 G Left Antecubital <1 day                    Significant Labs:  Recent Results (from the past 72 hour(s))   Protime-INR    Collection Time: 01/02/24 10:42 AM   Result Value Ref Range    PT 20.7 (H) 12.5 - 14.5 seconds    INR 1.8 (H) <=1.3   APTT    Collection Time: 01/02/24 10:42 AM   Result Value Ref Range    PTT 35.8 (H) 23.2 - 33.7 seconds   Comprehensive metabolic panel    Collection Time: 01/02/24 10:42 AM   Result Value Ref Range    Sodium Level 142 132 - 146 mmol/L    Potassium Level 5.7 (H) 3.5 - 5.1 mmol/L    Chloride 113 (H) 98 - 111 mmol/L    Carbon Dioxide 16 (L) 23 - 31 mmol/L    Glucose Level 84 75 - 121 mg/dL    Blood Urea Nitrogen 83.4 (H) 8.4 - 25.7 mg/dL    Creatinine 3.10 (H) 0.73 - 1.18 mg/dL    Calcium Level Total 8.4 (L) 8.8 - 10.0 mg/dL    Protein Total 5.7 (L) 5.8 - 7.6 gm/dL    Albumin Level 2.5 (L) 3.4 - 4.8 g/dL    Globulin 3.2 2.4 - 3.5 gm/dL    Albumin/Globulin Ratio 0.8 (L) 1.1 - 2.0 ratio     Bilirubin Total 0.7 <=1.5 mg/dL    Alkaline Phosphatase 36 (L) 40 - 150 unit/L    Alanine Aminotransferase 21 0 - 55 unit/L    Aspartate Aminotransferase 33 5 - 34 unit/L    eGFR 18 mls/min/1.73/m2   Magnesium    Collection Time: 01/02/24 10:42 AM   Result Value Ref Range    Magnesium Level 1.50 (L) 1.60 - 2.60 mg/dL   Type & Screen    Collection Time: 01/02/24 10:42 AM   Result Value Ref Range    Group & Rh AB POS     Indirect Smitha GEL NEG     Specimen Outdate 01/05/2024 23:59    Troponin I    Collection Time: 01/02/24 10:42 AM   Result Value Ref Range    Troponin-I 0.236 (H) 0.000 - 0.045 ng/mL   CBC with Differential    Collection Time: 01/02/24 10:42 AM   Result Value Ref Range    WBC 13.89 (H) 4.50 - 11.50 x10(3)/mcL    RBC 4.19 (L) 4.70 - 6.10 x10(6)/mcL    Hgb 11.4 (L) 14.0 - 18.0 g/dL    Hct 37.4 (L) 42.0 - 52.0 %    MCV 89.3 80.0 - 94.0 fL    MCH 27.2 27.0 - 31.0 pg    MCHC 30.5 (L) 33.0 - 36.0 g/dL    RDW 16.0 11.5 - 17.0 %    Platelet 194 130 - 400 x10(3)/mcL    MPV 11.6 (H) 7.4 - 10.4 fL    NRBC% 0.0 %   Manual Differential    Collection Time: 01/02/24 10:42 AM   Result Value Ref Range    WBC 13.89 x10(3)/mcL    Neutrophils % 76 %    Lymphs % 8 %    Basophils % 1 %    Metamyelocytes % 7 (H) <=0 %    Myelocytes % 7 (H) <=0 %    Neutrophils Abs 10.5564 (H) 2.1 - 9.2 x10(3)/mcL    Lymphs Abs 1.1112 0.6 - 4.6 x10(3)/mcL    Basophils Abs 0.1389 0 - 0.2 x10(3)/mcL    Platelets Normal Normal, Adequate    RBC Morph Abnormal (A) Normal    Poikilocytosis 1+ (A) (none)    Jodi Cells 1+ (A) (none)   Brain natriuretic peptide    Collection Time: 01/02/24 10:42 AM   Result Value Ref Range    Natriuretic Peptide 1,970.5 (H) <=100.0 pg/mL   Path Review, Peripheral Smear    Collection Time: 01/02/24 10:42 AM   Result Value Ref Range    Peripheral Smear Evaluation       The red blood cells are unremarkable. The neutrophils show a mild left shift in maturation. The lymphocytes and platelets are unremarkable.    Peter  LYDIA Burk, Ph.D.    [facility] RETYPE    Collection Time: 01/02/24 11:05 AM   Result Value Ref Range    ABOR Retype AB POS    RT Blood Gas    Collection Time: 01/02/24 11:18 AM   Result Value Ref Range    Sample Type Arterial Blood     Sample site Left Radial Artery     Drawn by sd rrt     pH, Blood gas 7.300 (L) 7.350 - 7.450    pCO2, Blood gas 32.0 (L) 35.0 - 45.0 mmHg    pO2, Blood gas 63.0 (L) 80.0 - 100.0 mmHg    Sodium, Blood Gas 135 (L) 137 - 145 mmol/L    Potassium, Blood Gas 5.2 (H) 3.5 - 5.0 mmol/L    Calcium Level Ionized 1.17 1.12 - 1.23 mmol/L    TOC2, Blood gas 16.7 mmol/L    Base Excess, Blood gas -9.70 (L) -2.00 - 2.00 mmol/L    sO2, Blood gas 89.1 %    HCO3, Blood gas 15.7 (L) 22.0 - 26.0 mmol/L    THb, Blood gas 11.1 (L) 12 - 16 g/dL    O2 Hb, Blood Gas 91.3 (L) 94.0 - 97.0 %    CO Hgb 0.5 0.5 - 1.5 %    Met Hgb 0.0 (L) 0.4 - 1.5 %    Allens Test Yes     Oxygen Device, Blood gas Cannula     LPM 3.5    Lactic acid, plasma    Collection Time: 01/02/24 11:56 AM   Result Value Ref Range    Lactic Acid Level 3.3 (H) 0.5 - 2.2 mmol/L   Echo    Collection Time: 01/02/24 12:00 PM   Result Value Ref Range    BSA 1.88 m2    Clifford's Biplane MOD Ejection Fraction 60 %    LVOT stroke volume 65.31 cm3    LVIDd 4.30 3.5 - 6.0 cm    LV Systolic Volume 30.10 mL    LV Systolic Volume Index 16.3 mL/m2    LVIDs 2.82 2.1 - 4.0 cm    LV Diastolic Volume 83.10 mL    LV Diastolic Volume Index 44.92 mL/m2    IVS 1.04 0.6 - 1.1 cm    LVOT diameter 2.00 cm    LVOT area 3.1 cm2    FS 34 28 - 44 %    Left Ventricle Relative Wall Thickness 0.50 cm    Posterior Wall 1.07 0.6 - 1.1 cm    LV mass 153.58 g    LV Mass Index 83 g/m2    MV Peak E Dimple 1.43 m/s    TDI LATERAL 0.09 m/s    TDI SEPTAL 0.08 m/s    E/E' ratio 16.82 m/s    MV Peak A Dimple 1.64 m/s    TR Max Dimple 3.08 m/s    E/A ratio 0.87     E wave deceleration time 301.00 msec    LV SEPTAL E/E' RATIO 17.88 m/s    LV LATERAL E/E' RATIO 15.89 m/s    LVOT peak dimple 1.02 m/s     Left Ventricular Outflow Tract Mean Velocity 0.74 cm/s    Left Ventricular Outflow Tract Mean Gradient 2.00 mmHg    RVDD 3.27 cm    TAPSE 2.75 cm    LA size 3.70 cm    LA volume (mod) 57.20 cm3    LA Volume Index (Mod) 30.9 mL/m2    AV mean gradient 18 mmHg    AV peak gradient 28 mmHg    Ao peak dimple 2.63 m/s    Ao VTI 53.20 cm    LVOT peak VTI 20.80 cm    AV valve area 1.23 cm²    AV Velocity Ratio 0.39     AV index (prosthetic) 0.39     BENJAMIN by Velocity Ratio 1.22 cm²    MV mean gradient 6 mmHg    MV peak gradient 12 mmHg    MV valve area by continuity eq 1.82 cm2    MV VTI 35.8 cm    Triscuspid Valve Regurgitation Peak Gradient 38 mmHg    Mean e' 0.09 m/s    ZLVIDS -0.93     ZLVIDD -1.78    Urinalysis, Reflex to Urine Culture    Collection Time: 01/02/24  1:05 PM    Specimen: Urine   Result Value Ref Range    Color, UA Yellow Yellow, Light-Yellow, Colorless, Straw, Dark-Yellow    Appearance, UA Turbid (A) Clear    Specific Gravity, UA 1.017 1.005 - 1.030    pH, UA 5.0 5.0 - 8.5    Protein, UA 1+ (A) Negative    Glucose, UA Normal Negative, Normal    Ketones, UA Negative Negative    Blood, UA 3+ (A) Negative    Bilirubin, UA Negative Negative    Urobilinogen, UA Normal 0.2, 1.0, Normal    Nitrites, UA Negative Negative    Leukocyte Esterase,  (A) Negative    WBC, UA 51-99 (A) None Seen, 0-2, 3-5, 0-5 /HPF    WBC Clumps, UA Few (A) None Seen, 0-5    Bacteria, UA Many (A) None Seen, Trace /HPF    Squamous Epithelial Cells, UA Trace None Seen /HPF    Mucous, UA Trace (A) None Seen /LPF    RBC, UA 50-99 (A) None Seen, 0-2, 3-5, 0-5 /HPF       Significant Imaging:  Imaging Results              US Retroperitoneal Complete (In process)                      X-Ray Chest 1 View (Final result)  Result time 01/02/24 10:38:49      Final result by Eligio Shaikh MD (01/02/24 10:38:49)                   Impression:      Changes suggestive of pulmonary vascular congestion and cardiac decompensation.    Slightly more  confluent opacities at the right base infiltrate cannot be completely excluded.    Right-sided pleural effusion      Electronically signed by: Eligio Shaikh  Date:    01/02/2024  Time:    10:38               Narrative:    EXAMINATION:  XR CHEST 1 VIEW    CPT 37256    CLINICAL HISTORY:  SOB;    FINDINGS:  Examination reveals mediastinal silhouette to be within normal limits cardiac silhouette is not enlarged there is increase in interstitial and pulmonary vascular markings indicating some degree of pulmonary vascular congestion and cardiac decompensation.    Slightly more confluent opacities identified at the right base superimposed infiltrate can not be completely excluded.    There is blunting of the right costophrenic angle representing a right-sided pleural effusion    No other focal consolidative changes                                      EKG:            Physical Exam  Cardiovascular:      Rate and Rhythm: Normal rate and regular rhythm.      Heart sounds: Murmur heard.   Pulmonary:      Breath sounds: Rhonchi present.   Abdominal:      Palpations: Abdomen is soft.      Comments: Suprapubic catheter--migue urine   Musculoskeletal:         General: Normal range of motion.   Skin:     General: Skin is warm.   Neurological:      General: No focal deficit present.      Mental Status: He is alert.       Home Medications:   No current facility-administered medications on file prior to encounter.     Current Outpatient Medications on File Prior to Encounter   Medication Sig Dispense Refill    atorvastatin (LIPITOR) 10 MG tablet Take 10 mg by mouth once daily.      calcitRIOL (ROCALTROL) 0.25 MCG Cap Take 0.25 mcg by mouth once daily. Takes every other day      gabapentin (NEURONTIN) 100 MG capsule Take 300 mg by mouth every evening.      metoprolol tartrate (LOPRESSOR) 25 MG tablet Take 25 mg by mouth 2 (two) times daily.      NON FORMULARY MEDICATION Take 850 mg by mouth Daily. Umary         Current Inpatient  Medications:    Current Facility-Administered Medications:     acetaminophen tablet 1,000 mg, 1,000 mg, Oral, Q6H PRN, Grisel Valentine FNP    acetaminophen tablet 650 mg, 650 mg, Oral, Q4H PRN, Grisel Valentine FNP    albuterol nebulizer solution, 10 mg/hr, Nebulization, Continuous, Mendoza Mckeon MD, Last Rate: 12 mL/hr at 01/02/24 1256, 10 mg/hr at 01/02/24 1256    albuterol-ipratropium 2.5 mg-0.5 mg/3 mL nebulizer solution 3 mL, 3 mL, Nebulization, Q4H PRN, Grisel Valentine FNP    azithromycin 500 mg in dextrose 5 % 250 mL IVPB (ready to mix), 500 mg, Intravenous, Q24H, Fadumo Ramirez DO    [START ON 1/3/2024] cefTRIAXone (ROCEPHIN) 1 g in dextrose 5 % in water (D5W) 100 mL IVPB (MB+), 1 g, Intravenous, Q24H, Fadumo Ramirez DO    magnesium sulfate 2g in water 50mL IVPB (premix), 2 g, Intravenous, ED 1 Time, Mendoza Mckeon MD    melatonin tablet 6 mg, 6 mg, Oral, Nightly PRN, Grisel Valentine FNP    naloxone 0.4 mg/mL injection 0.02 mg, 0.02 mg, Intravenous, PRN, Grisel Valentine FNP    ondansetron injection 4 mg, 4 mg, Intravenous, Q4H PRN, Grisel Valentine FNP    prochlorperazine injection Soln 5 mg, 5 mg, Intravenous, Q6H PRN, Grisel Valentine FNP    simethicone chewable tablet 80 mg, 1 tablet, Oral, QID PRN, Grisel Valentine FNP    sodium chloride 0.9% flush 10 mL, 10 mL, Intravenous, PRN, Grisel Valentine FNP    Current Outpatient Medications:     atorvastatin (LIPITOR) 10 MG tablet, Take 10 mg by mouth once daily., Disp: , Rfl:     calcitRIOL (ROCALTROL) 0.25 MCG Cap, Take 0.25 mcg by mouth once daily. Takes every other day, Disp: , Rfl:     gabapentin (NEURONTIN) 100 MG capsule, Take 300 mg by mouth every evening., Disp: , Rfl:     metoprolol tartrate (LOPRESSOR) 25 MG tablet, Take 25 mg by mouth 2 (two) times daily., Disp: , Rfl:     NON FORMULARY MEDICATION, Take 850 mg by mouth Daily. Umary, Disp: , Rfl:          VTE Risk  Mitigation (From admission, onward)           Ordered     Reason for No Pharmacological VTE Prophylaxis  Once        Question:  Reasons:  Answer:  Already adequately anticoagulated on oral Anticoagulants    01/02/24 1329     IP VTE HIGH RISK PATIENT  Once         01/02/24 1329     Place sequential compression device  Until discontinued         01/02/24 1329                    Assessment:   NSTEMI, unspecified  --troponin 0.236  HFpEF  --decompensated  --BNP 1970  --EF 60-65%  Acute kidney failure  --BUN/creatinine 83.4/3.10 on admit  Afib, unspecified  --CHADS VASC score 4  --on Eliquis 2.5 mg bid outpatient  VHD  --mild to moderate AS/TR  --Mild MS with severe MAC  Lactic acidosis  --Lactic acid 4.1  Hyperkalemia  --K+ 5.7  --tx with albuterol  Hypomagnesemia  --Mg 1.50  Leukocytosis  --13.89  COPD    Plan:   Recommend nephrology consult due to ARF  Trend enzymes  Preserved EF and no RWMA. Start aspirin 81 mg daily. Continue statin, and BB  Mg repleted  ABX per primary    Thank you for your consult.     Jeri Dunlap, NADIR  Cardiology  [facility] - Emergency Dept  01/02/2024 2:35 PM     Physician addendum:  I have seen and examined this patient as a split-shared visit with the DAYANA d/t complicated medical management of above problems written in assessment and high acuity requiring physician expertise in medical decision-making. I performed the substantive portion of the history and exam. Above medical decision-making is also formulated by me.    Cardiovascular exam:  S1, S2  Lungs:  fine crackles at bases.  Extremities:  1+ edema bilaterally    Plan:  Follow up markers.  Medications as above.      Mukund Meol MD  Cardiologist

## 2024-01-02 NOTE — H&P
[facility] Hospital Medicine History & Physical Examination       Patient Name: Francis Montero  MRN: [id number]  Patient Class: IP- Inpatient   Admission Date: 1/2/2024   Admitting Physician: ANABELL Service   Length of Stay: 0  Attending Physician: Dr. Fadumo Ramirez  Primary Care Provider: [facility] in [place]  Face-to-Face encounter date: 01/02/2024  Code Status: Full code  Chief Complaint: Altered Mental Status (Arrives via EMS for AMS & SOB. EMS reports initial SPO2 85% on RA - improved to 92% when placed on 3L NC. Initial BP 70/30 - received 200 ml NS in route. Baseline GCS 15, currently GCS 14.)        Patient information was obtained from patient, patient's family, past medical records and ER records.     HISTORY OF PRESENT ILLNESS:   Francis Montero is a 89 y.o. male who PMH includes COPD, Atrial fibrillation, Merkel Cell Carcinoma, lung cancer, prostate cancer,  arthritis, RA, HTN, HLD, neuropathy; presents to the ED at [facility] on 1/2/2024 via EMS with a primary complaint of shortness a breath and altered mental status.  It was reported that his O2 saturation was 85% on room air with EMS which she was placed on supplemental oxygen therapy at 3 L per nasal cannula with improvement in saturations to 92%.  It was reported with EMS patient's initial blood pressure was 70/30s which he received normal saline bolus in route to the ED. no reports of any injury, traumas, or any falls.  Patient is awake alert and conversant at the time of examination. Wife providing the history, pt is [place].  He denies any chest pain, nausea, vomiting, diarrhea, fever, chills, or any sick contacts.  Lab work reviewed demonstrated WBCs 13.89, H&H 11.4/37.4, PT 20.7, INR 1.8, PTT 35.8, potassium 5.7, chloride 113, CO2 16, BUN 83.4, creatinine 3.10; magnesium 1.5, alkaline phosphatase 36; other indices unremarkable.  Chest x-ray impression reviewed demonstrated changes suggestive of pulmonary vascular congestion and cardiac  "decompensation, slightly more confluent opacities at the right base infiltrate can not be completely excluded, right-sided pleural effusion.  Initial vital signs /87 pulse 82 respirations 21 temperature 98.2° F O2 saturation 92% on 3 L per nasal cannula.  Patient received 50 mEq of sodium bicarbonate IV, IV bolus, hour long nebulizer treatment, and started on 1 g IV ceftriaxone in the ED. patient is admitted to hospital medicine services for further management.  Discussed code status and resuscitation with patient and wife which patient wants "everything done".     PAST MEDICAL HISTORY:   COPD  Atrial fibrillation  Merkel cell carcinoma   Rheumatoid arthritis  Arthritis  HTN  HLD  Neuropathy   Lung cancer  Prostate cancer    PAST SURGICAL HISTORY:   Endoscopy   Angiogram  SP catheter placement  Renal stent  Prostate seed implants    ALLERGIES:   Patient has no known allergies.    FAMILY HISTORY:   Reviewed and negative    SOCIAL HISTORY:   Denies any recent tobacco use  Denies any illicit drug use   Denies any alcohol use  Lives with family     HOME MEDICATIONS:   As documented  Prior to Admission medications    Medication Sig Start Date End Date Taking? Authorizing Provider   atorvastatin (LIPITOR) 10 MG tablet Take 10 mg by mouth once daily.   Yes Provider, Historical   calcitRIOL (ROCALTROL) 0.25 MCG Cap Take 0.25 mcg by mouth once daily. Takes every other day   Yes Provider, Historical   gabapentin (NEURONTIN) 100 MG capsule Take 300 mg by mouth every evening.   Yes Provider, Historical   metoprolol tartrate (LOPRESSOR) 25 MG tablet Take 25 mg by mouth 2 (two) times daily.   Yes Provider, Historical   NON FORMULARY MEDICATION Take 850 mg by mouth Daily. Umary   Yes Provider, Historical       REVIEW OF SYSTEMS:   Except as documented, all other systems reviewed and negative     PHYSICAL EXAM:     VITAL SIGNS: 24 HRS MIN & MAX LAST   Temp  Min: 98.2 °F (36.8 °C)  Max: 98.2 °F (36.8 °C) 98.2 °F (36.8 °C)   BP "  Min: 89/46  Max: 109/44 (!) 102/53   Pulse  Min: 78  Max: 93  93   Resp  Min: 18  Max: 30 (!) 22   SpO2  Min: 86 %  Max: 100 % 97 %       General appearance:  Elderly male chronically ill-appearing; fatigued, acutely ill appearing, nontoxic, wife at bedside  HENT: Atraumatic head.dry mucous membranes of oral cavity, [place]  Eyes:PERRL  Lungs: diminished bilaterally, coarse breath sounds with scattered crackles., audible wheezing and crackles; moderately labored respirations, O2 per NC in use.   Heart: Regular rate and rhythm. S1 and S2 present cap refill brisk, trace edema to BLE  Abdomen: Soft, non-distended, non-tender. No rebound tenderness/guarding. Bowel sounds are normal. Suprapubic catheter in place to gravity drainage  Extremities: No cyanosis, clubbing,generalized weakness  Skin: warm and dry  Neuro:oriented x 3 no focal deficits  Psych/mental status: flat  affect, cooperative     LABS AND IMAGING:     Recent Labs   Lab 01/02/24  1042   WBC 13.89  13.89*   RBC 4.19*   HGB 11.4*   HCT 37.4*   MCV 89.3   MCH 27.2   MCHC 30.5*   RDW 16.0      MPV 11.6*       Recent Labs   Lab 01/02/24  1042 01/02/24  1118     --    K 5.7*  --    CO2 16*  --    BUN 83.4*  --    CREATININE 3.10*  --    CALCIUM 8.4*  --    PH  --  7.300*   MG 1.50*  --    ALBUMIN 2.5*  --    ALKPHOS 36*  --    ALT 21  --    AST 33  --    BILITOT 0.7  --        Microbiology Results (last 7 days)       Procedure Component Value Units Date/Time    Blood culture #2 **CANNOT BE ORDERED STAT** [[id number]] Collected: 01/02/24 1156    Order Status: Resulted Specimen: Blood Updated: 01/02/24 1211    Blood culture #1 **CANNOT BE ORDERED STAT** [[id number]] Collected: 01/02/24 1156    Order Status: Resulted Specimen: Blood Updated: 01/02/24 1210             Echo    Left Ventricle: The left ventricle is normal in size. Normal wall   thickness. Normal wall motion. There is normal systolic function with a   visually estimated ejection fraction of  60 - 65%. Diastolic function   cannot be reliably determined in the presence of mitral valve disease.   Inconclusive left ventricular filling pressure.    Right Ventricle: Normal right ventricular cavity size. Systolic   function is normal.    Aortic Valve: There is mild to moderate stenosis. Aortic valve area by   VTI is 1.23 cm². Aortic valve peak velocity is 2.63 m/s. Mean gradient is   18 mmHg. The dimensionless index is 0.39. There is trace aortic   regurgitation.    Mitral Valve: There is severe mitral annular calcification present.   There is mild stenosis. The mean pressure gradient across the mitral valve   is 6 mmHg at a heart rate of  bpm.    Tricuspid Valve: There is mild to moderate regurgitation. The estimated   PA systolic pressure is at least 38 mmHg.  X-Ray Chest 1 View  Narrative: EXAMINATION:  XR CHEST 1 VIEW    CPT 85788    CLINICAL HISTORY:  SOB;    FINDINGS:  Examination reveals mediastinal silhouette to be within normal limits cardiac silhouette is not enlarged there is increase in interstitial and pulmonary vascular markings indicating some degree of pulmonary vascular congestion and cardiac decompensation.    Slightly more confluent opacities identified at the right base superimposed infiltrate can not be completely excluded.    There is blunting of the right costophrenic angle representing a right-sided pleural effusion    No other focal consolidative changes  Impression: Changes suggestive of pulmonary vascular congestion and cardiac decompensation.    Slightly more confluent opacities at the right base infiltrate cannot be completely excluded.    Right-sided pleural effusion    Electronically signed by: Eligio Shaikh  Date:    01/02/2024  Time:    10:38        ASSESSMENT & PLAN:   ASSESSMENT:  Acute respiratory failure with hypoxia-requiring supplemental oxygen therapy-POA   Pneumonia-right base, bacterial-POA   New onset heart failure-suspected-POA  STEPHEN-POA  COPD chronic with acute  exacerbation-POA  PAF- controlled rate, on Eliquis- POA  Lactic acidosis-suspected secondary to pneumonia-POA  Leukocytosis-suspected secondary to pneumonia-POA  Elevated BNP with vascular congestion-POA  Electrolyte imbalance-POA   HTN-essential-POA  Weakness-POA     Hx of HLD, Merkel cell carcinoma, rheumatoid arthritis, neuropathy, over active bladder, GERD, prostate cancer, lung cancer, suprapubic catheter placement      PLAN:  Consult Cardiology Services appreciate assistance and recommendations   Trend out troponin levels  Renal ultrasound  Echocardiogram  Accurate I&O  Daily weights   Replete electrolytes  Repeat BNP at 4:00 p.m.   DuoNeb treatments q.4 hours PRN  Follow cultures and sensitivities   With IV ceftriaxone and azithromycin therapy   Resume home medication as deemed necessary  Repeat lab work in a.m.    VTE Prophylaxis: Eliquis for DVT prophylaxis and will be advised to be as mobile as possible and sit in a chair as tolerated    Patient condition:  Stable    __________________________________________________________________________  INPATIENT LIST OF MEDICATIONS     Scheduled Meds:   azithromycin  500 mg Intravenous Q24H    [START ON 1/3/2024] cefTRIAXone (ROCEPHIN) IVPB  1 g Intravenous Q24H    magnesium sulfate IVPB  2 g Intravenous ED 1 Time     Continuous Infusions:   albuterol 0.083% 10 mg/hr (01/02/24 1256)     PRN Meds:.acetaminophen, acetaminophen, albuterol sulfate, albuterol-ipratropium, melatonin, naloxone, ondansetron, prochlorperazine, simethicone, sodium chloride 0.9%      IGrisel FNP have reviewed and discussed the case with Dr. Fadumo Ramirez.  Please see the following addendum for further assessment and plan from there attending NADIR Meza   01/02/2024

## 2024-01-02 NOTE — ED NOTES
Arrrived to ed via EMS. Pt reports SOB x1 year worsening today. Answers questions but forgetful. Confused. EMS reports initial o2 sat 85% on RA and hypotensive bp: 70/30.  ml bolus given in route. Placed on BSM. HOB up. Coarse lung sounds b/l. Reports HX of COPD. 3l/NC in progress. Will monitor.

## 2024-01-02 NOTE — H&P
I, Dr. Ramirez assumed care of this patient today, 1/2/24.    For the patient encounter, I performed the substantive portion of the visit, I reviewed the NP/PA documentation, treatment plan, and medical decision making.  I had face to face time with this patient.     Patient presents for worsening shortness of breath  for about 2 or 3 days with no reports of associated symptoms.    His son is present at bedside and states that patient had shingles about 1 year ago and since then he is complained of neck pain.    Patient has no associated symptoms at this time.  No trouble peeing, no nausea, no vomiting, no diarrhea.    No abdominal pain reported.      On physical exam, he does not appear hypervolemic, abdomen slightly distended but he states that is his normal.    Minimal pain cervical spine posteriorly but no redness.  No significant rash appreciated.  Patient is alert awake and oriented to person place but not circumstance.  He does follow simple commands.  He answers questions appropriately.      Patient received 1 amp of bicarb in the ED and 1500 mL bolus total, lactic acid increased from 3 to 4;     Chest x-ray with significant right side pleural effusion, he was hypotensive however last blood pressure reveals 130/44 with a map of 73.  Repeat blood pressure reveals a map of greater than 86.  We will give 1 time dose of Lasix 40 mg and follow up appropriately, with plans to schedule Lasix therapy.    Continue to monitor renal function and urinary output, nephrologist has been consulted.  Will likely need a Graff for accurate I&Os.        Patient was given albuterol inhaler 2 shift serum potassium.    Will repeat blood work stat.    Echocardiogram with the EF of 60 65% however diastolic function can not be obtained.    Followup with blood/urine cultures.     Shortness of breath is likely a consequence of significant pleural effusion with concern of pneumonia as well though difficult to a certain at this time.       ABGs have been reviewed and reveals there is a metabolic acidosis and what appears to be compensated respiratory alkalosis.  Patient also has a metabolic acidosis likely related to his lactic acid being elevated.  He has received several boluses of fluids, repeat lactic acid is pending.      Will initiate DVT prophylaxis     We will consult nephrologist due to hypovolemia, electrolyte disturbances and acute renal failure without known baseline.  Renal ultrasound without hydronephrosis.    Will obtain x-ray of cervical spine due to minimal pain.    This is a critical care document  Critical Care Diagnosis:  Hypoxic respiratory failure, electrolyte disturbances.  Critical care interventions: hands on evaluation, review of labs/radiographs/records and discussions; assessing and managing the high probability of imminent or life-threatening deterioration of cardiorespiratory status.  Critical care time spent > 40 minutes.        All diagnosis and differential diagnosis have been reviewed; assessment and plan has been documented; I have personally reviewed the labs and test results that are presently available; I have reviewed the patients medication list; I have reviewed the consulting providers response and recommendations. I have reviewed or attempted to review medical records based upon their availability.    All of the patient and family questions have been addressed and answered. Patient's is agreeable to the above stated plan. I will continue to monitor closely and make adjustments to medical management as needed.     Dr. Fadumo Ramirez DO    I have spent >30 minutes on the day of the visit; time spent includes face to face time and non-face to face time preparing to see the patient (eg, review of tests), independently reviewing and interpreting medical records, both past and current; documenting clinical information in the electronic or other health record, and communicating results to the  patient/family/caregiver and care coordinator and nursing team.

## 2024-01-02 NOTE — ED PROVIDER NOTES
Encounter Date: 1/2/2024       History     Chief Complaint   Patient presents with    Altered Mental Status     Arrives via EMS for AMS & SOB. EMS reports initial SPO2 85% on RA - improved to 92% when placed on 3L NC. Initial BP 70/30 - received 200 ml NS in route. Baseline GCS 15, currently GCS 14.     HPI  89-year-old male with history of COPD presents to the ED for SOB.  Per EMS report initial SpO2 85% on room air which improved to 92% on 3 L in NC.  He was hypotensive with BP 70/30 and received 200 mL NS with EMS.  Patient does admit to occasional dark stool.  Denies fever, cough, chest pain, abdominal pain, diarrhea.    Review of patient's allergies indicates:  No Known Allergies  No past medical history on file.  No past surgical history on file.  No family history on file.     Review of Systems   Constitutional:  Negative for chills, fatigue and fever.   Respiratory:  Positive for shortness of breath. Negative for cough.    Cardiovascular:  Negative for chest pain and palpitations.   Gastrointestinal:  Negative for abdominal pain, diarrhea, nausea and vomiting.   Genitourinary:  Negative for dysuria.   Neurological:  Negative for dizziness, syncope, speech difficulty, weakness, light-headedness and headaches.   Psychiatric/Behavioral:  Negative for confusion.        Physical Exam     Initial Vitals [01/02/24 0930]   BP Pulse Resp Temp SpO2   109/87 82 18 98.2 °F (36.8 °C) (!) 92 %      MAP       --         Physical Exam    Nursing note and vitals reviewed.  Constitutional: He appears well-developed and well-nourished. He is not diaphoretic.   HENT:   Head: Normocephalic and atraumatic.   Mouth/Throat: Mucous membranes are dry. No oropharyngeal exudate.   Impaired hearing at baseline   Eyes: EOM are normal. No scleral icterus.   Neck: Neck supple. No JVD present.   Cardiovascular:  Normal rate and regular rhythm.     Exam reveals no gallop and no friction rub.       No murmur heard.  Pulmonary/Chest: He has no  wheezes. He has no rhonchi.   Tachypnea on 3 L NC  Coarse breath sounds in bilateral lower lung fields worse on right lower lung   Abdominal: Abdomen is soft. Bowel sounds are normal. He exhibits no distension. There is no abdominal tenderness.   Genitourinary:    Genitourinary Comments: Suprapubic catheter without surrounding erythema or drainage.     Musculoskeletal:         General: No edema.      Cervical back: Neck supple.     Neurological: He is alert and oriented to person, place, and time. GCS score is 15. GCS eye subscore is 4. GCS verbal subscore is 5. GCS motor subscore is 6.   Skin: Skin is warm and dry. Capillary refill takes less than 2 seconds.         ED Course   Procedures  Labs Reviewed   PROTIME-INR - Abnormal; Notable for the following components:       Result Value    PT 20.7 (*)     INR 1.8 (*)     All other components within normal limits   APTT - Abnormal; Notable for the following components:    PTT 35.8 (*)     All other components within normal limits   COMPREHENSIVE METABOLIC PANEL - Abnormal; Notable for the following components:    Potassium Level 5.7 (*)     Chloride 113 (*)     Carbon Dioxide 16 (*)     Blood Urea Nitrogen 83.4 (*)     Creatinine 3.10 (*)     Calcium Level Total 8.4 (*)     Protein Total 5.7 (*)     Albumin Level 2.5 (*)     Albumin/Globulin Ratio 0.8 (*)     Alkaline Phosphatase 36 (*)     All other components within normal limits   MAGNESIUM - Abnormal; Notable for the following components:    Magnesium Level 1.50 (*)     All other components within normal limits   TROPONIN I - Abnormal; Notable for the following components:    Troponin-I 0.236 (*)     All other components within normal limits   URINALYSIS, REFLEX TO URINE CULTURE - Abnormal; Notable for the following components:    Appearance, UA Turbid (*)     Protein, UA 1+ (*)     Blood, UA 3+ (*)     Leukocyte Esterase,  (*)     WBC, UA 51-99 (*)     WBC Clumps, UA Few (*)     Bacteria, UA Many (*)      Mucous, UA Trace (*)     RBC, UA 50-99 (*)     All other components within normal limits   CBC WITH DIFFERENTIAL - Abnormal; Notable for the following components:    WBC 13.89 (*)     RBC 4.19 (*)     Hgb 11.4 (*)     Hct 37.4 (*)     MCHC 30.5 (*)     MPV 11.6 (*)     All other components within normal limits   BLOOD GAS - Abnormal; Notable for the following components:    pH, Blood gas 7.300 (*)     pCO2, Blood gas 32.0 (*)     pO2, Blood gas 63.0 (*)     Sodium, Blood Gas 135 (*)     Potassium, Blood Gas 5.2 (*)     Base Excess, Blood gas -9.70 (*)     HCO3, Blood gas 15.7 (*)     THb, Blood gas 11.1 (*)     O2 Hb, Blood Gas 91.3 (*)     Met Hgb 0.0 (*)     All other components within normal limits   MANUAL DIFFERENTIAL - Abnormal; Notable for the following components:    Metamyelocytes % 7 (*)     Myelocytes % 7 (*)     Neutrophils Abs 10.5564 (*)     RBC Morph Abnormal (*)     Poikilocytosis 1+ (*)     [place] Cells 1+ (*)     All other components within normal limits   B-TYPE NATRIURETIC PEPTIDE - Abnormal; Notable for the following components:    Natriuretic Peptide 1,970.5 (*)     All other components within normal limits   LACTIC ACID, PLASMA - Abnormal; Notable for the following components:    Lactic Acid Level 3.3 (*)     All other components within normal limits   BLOOD CULTURE [facility]   BLOOD CULTURE [facility]   CULTURE, URINE   CBC W/ AUTO DIFFERENTIAL    Narrative:     The following orders were created for panel order CBC auto differential.  Procedure                               Abnormality         Status                     ---------                               -----------         ------                     CBC with Differential[[id number]]       Abnormal            Final result               Manual Differential[[id number]]         Abnormal            Final result                 Please view results for these tests on the individual orders.   PATH REVIEW OF BLOOD SMEAR   LACTIC ACID, PLASMA   BASIC METABOLIC  PANEL   TROPONIN I   COVID/RSV/FLU A&B PCR   TYPE & SCREEN   ABORH RETYPE     EKG Readings: (Independently Interpreted)   Sinus rhythm with occasional premature atrial complexes rate of 82, QTc 425 ms, normal axis, no ST elevation       Imaging Results              X-Ray Chest 1 View (Final result)  Result time 01/02/24 10:38:49      Final result by Eligio Shaikh MD (01/02/24 10:38:49)                   Impression:      Changes suggestive of pulmonary vascular congestion and cardiac decompensation.    Slightly more confluent opacities at the right base infiltrate cannot be completely excluded.    Right-sided pleural effusion      Electronically signed by: Eligio Shaikh  Date:    01/02/2024  Time:    10:38               Narrative:    EXAMINATION:  XR CHEST 1 VIEW    CPT 11554    CLINICAL HISTORY:  SOB;    FINDINGS:  Examination reveals mediastinal silhouette to be within normal limits cardiac silhouette is not enlarged there is increase in interstitial and pulmonary vascular markings indicating some degree of pulmonary vascular congestion and cardiac decompensation.    Slightly more confluent opacities identified at the right base superimposed infiltrate can not be completely excluded.    There is blunting of the right costophrenic angle representing a right-sided pleural effusion    No other focal consolidative changes                                       Medications   albuterol nebulizer solution (10 mg/hr Nebulization New Bag 1/2/24 1256)   albuterol-ipratropium 2.5 mg-0.5 mg/3 mL nebulizer solution 3 mL (has no administration in time range)   cefTRIAXone (ROCEPHIN) 1 g in dextrose 5 % in water (D5W) 100 mL IVPB (MB+) (has no administration in time range)   azithromycin 500 mg in dextrose 5 % 250 mL IVPB (ready to mix) (has no administration in time range)   magnesium sulfate 2g in water 50mL IVPB (premix) (has no administration in time range)   sodium chloride 0.9% flush 10 mL (has no administration in  time range)   ondansetron injection 4 mg (has no administration in time range)   prochlorperazine injection Soln 5 mg (has no administration in time range)   acetaminophen tablet 650 mg (has no administration in time range)   acetaminophen tablet 1,000 mg (has no administration in time range)   naloxone 0.4 mg/mL injection 0.02 mg (has no administration in time range)   melatonin tablet 6 mg (has no administration in time range)   simethicone chewable tablet 80 mg (has no administration in time range)   sodium chloride 0.9% bolus 1,000 mL 1,000 mL (0 mLs Intravenous Stopped 1/2/24 1108)   albuterol-ipratropium 2.5 mg-0.5 mg/3 mL nebulizer solution 3 mL (3 mLs Nebulization Given 1/2/24 1218)   sodium bicarbonate solution 50 mEq (50 mEq Intravenous Given 1/2/24 1227)   cefTRIAXone (ROCEPHIN) 2 g in dextrose 5 % in water (D5W) 100 mL IVPB (MB+) (2 g Intravenous New Bag 1/2/24 1304)   sodium chloride 0.9% bolus 500 mL 500 mL (500 mLs Intravenous New Bag 1/2/24 1232)     Medical Decision Making    ED assessment:    89-year-old presenting with SOB and questionable AMS initially with low BP and tachypnea    Differential diagnosis (including but not limited to):   COPD exacerbation, PNA, CHF exacerbation,    ED management:  Refer to ED course    Amount and/or Complexity of Data Reviewed  Independent Historian: spouse and EMS     Details: Refer to HPI    Past states patient has COPD and blocking and is followed by pulmonologist in [place].  External Data Reviewed: labs and radiology.     Details: Creatinine elevated at outside facility, prior chest CT shows pulmonary scarring versus acute infection.  Labs: ordered. Decision-making details documented in ED Course.  Radiology: ordered. Decision-making details documented in ED Course.  ECG/medicine tests: ordered and independent interpretation performed.    Risk  OTC drugs.  Prescription drug management.  Decision regarding hospitalization.               ED Course as of 01/02/24  1408   Tue Jan 02, 2024   1325 Troponin I(!): 0.236 [BB]   1325 BNP(!): 1,970.5 [BB]   1326 Magnesium (!): 1.50 [BB]   1326 Potassium(!): 5.7 [BB]   1326 CO2(!): 16 [BB]   1326 Lactate, Jose A(!): 3.3 [BB]   1326 WBC(!): 13.89 [BB]   1326 POC PH(!): 7.300  Patient presented with hypotension normalized after 1.5 L NS bolus and SOB with initial SpO2 85% which corrected to 92% on 3 L nasal cannula.  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  Initial lab work showed leukocytosis with anion gap metabolic acidosis gap of 13 likely secondary to lactic acidosis of 3.3.  CXR showed bilateral pleural effusion worse on right.  Blood cultures were drawn and patient was administered Rocephin 2 g.  Labs also showed troponin of 0.236 and BNP 1970, echo shows EF 60-65% with no obvious wall motion abnormalities.  EKG was sinus rhythm PACs rate of 82, no ST elevations, normal axis, no QT prolongation.  Magnesium 1.5 repleted with 2 g.  Cardiology consulted due to elevated troponin with no known cardiac history.  Hyperkalemia treated with albuterol. Hospital Medicine was contacted for admission [BB]      ED Course User Index  [BB] Mendoza Mckeon MD                             Clinical Impression:  Final diagnoses:  [R06.02] SOB (shortness of breath) (Primary)  [I50.9] Acute exacerbation of CHF (congestive heart failure)  [J90] Pleural effusion          ED Disposition Condition    Admit                 Mendoza Mckeon MD  Resident  01/02/24 5564

## 2024-01-03 LAB
ABS NEUT (OLG): 7.05 X10(3)/MCL (ref 2.1–9.2)
ALBUMIN SERPL-MCNC: 2.3 G/DL (ref 3.4–4.8)
ALBUMIN/GLOB SERPL: 0.6 RATIO (ref 1.1–2)
ALLENS TEST BLOOD GAS (OHS): ABNORMAL
ALLENS TEST BLOOD GAS (OHS): YES
ALP SERPL-CCNC: 47 UNIT/L (ref 40–150)
ALT SERPL-CCNC: 21 UNIT/L (ref 0–55)
APTT PPP: 44.8 SECONDS (ref 23.2–33.7)
APTT PPP: 82.3 SECONDS (ref 23.2–33.7)
AST SERPL-CCNC: 47 UNIT/L (ref 5–34)
BASE EXCESS BLD CALC-SCNC: -4.6 MMOL/L
BASE EXCESS BLD CALC-SCNC: -5.1 MMOL/L
BILIRUB SERPL-MCNC: 0.4 MG/DL
BLOOD GAS SAMPLE TYPE (OHS): ABNORMAL
BLOOD GAS SAMPLE TYPE (OHS): ABNORMAL
BUN SERPL-MCNC: 80.2 MG/DL (ref 8.4–25.7)
CA-I BLD-SCNC: 1.13 MMOL/L (ref 1.12–1.23)
CA-I BLD-SCNC: 1.15 MMOL/L (ref 1.12–1.23)
CALCIUM SERPL-MCNC: 8.7 MG/DL (ref 8.8–10)
CHLORIDE SERPL-SCNC: 112 MMOL/L (ref 98–111)
CO2 BLDA-SCNC: 19.5 MMOL/L
CO2 BLDA-SCNC: 19.9 MMOL/L
CO2 SERPL-SCNC: 16 MMOL/L (ref 23–31)
CPAP BLOOD GAS (OHS): 10 CM H2O
CREAT SERPL-MCNC: 2.59 MG/DL (ref 0.73–1.18)
DRAWN BY BLOOD GAS (OHS): ABNORMAL
DRAWN BY BLOOD GAS (OHS): ABNORMAL
ERYTHROCYTE [DISTWIDTH] IN BLOOD BY AUTOMATED COUNT: 15.9 % (ref 11.5–17)
GFR SERPLBLD CREATININE-BSD FMLA CKD-EPI: 22 MLS/MIN/1.73/M2
GLOBULIN SER-MCNC: 3.8 GM/DL (ref 2.4–3.5)
GLUCOSE SERPL-MCNC: 106 MG/DL (ref 75–121)
HCO3 BLDA-SCNC: 18.6 MMOL/L (ref 22–26)
HCO3 BLDA-SCNC: 19 MMOL/L (ref 22–26)
HCT VFR BLD AUTO: 38 % (ref 42–52)
HGB BLD-MCNC: 12.2 G/DL (ref 14–18)
INHALED O2 CONCENTRATION: 55 %
INR PPP: 1.7
INSTRUMENT WBC (OLG): 8.6 X10(3)/MCL
LPM (OHS): 15
LYMPHOCYTES NFR BLD MANUAL: 0.77 X10(3)/MCL
LYMPHOCYTES NFR BLD MANUAL: 9 %
MAGNESIUM SERPL-MCNC: 2 MG/DL (ref 1.6–2.6)
MCH RBC QN AUTO: 27.1 PG (ref 27–31)
MCHC RBC AUTO-ENTMCNC: 32.1 G/DL (ref 33–36)
MCV RBC AUTO: 84.3 FL (ref 80–94)
METAMYELOCYTES NFR BLD MANUAL: 9 %
MODE (OHS): ABNORMAL
NEUTROPHILS NFR BLD MANUAL: 82 %
NRBC BLD AUTO-RTO: 0 %
OXYGEN DEVICE BLOOD GAS (OHS): ABNORMAL
PCO2 BLDA: 30 MMHG (ref 35–45)
PCO2 BLDA: 30 MMHG (ref 35–45)
PH BLDA: 7.4 [PH] (ref 7.35–7.45)
PH BLDA: 7.41 [PH] (ref 7.35–7.45)
PHOSPHATE SERPL-MCNC: 3.8 MG/DL (ref 2.3–4.7)
PLATELET # BLD AUTO: 191 X10(3)/MCL (ref 130–400)
PLATELET # BLD EST: NORMAL 10*3/UL
PMV BLD AUTO: 11.5 FL (ref 7.4–10.4)
PO2 BLDA: 56 MMHG (ref 80–100)
PO2 BLDA: 60 MMHG (ref 80–100)
POTASSIUM BLOOD GAS (OHS): 4.6 MMOL/L (ref 3.5–5)
POTASSIUM BLOOD GAS (OHS): 4.7 MMOL/L (ref 3.5–5)
POTASSIUM SERPL-SCNC: 5.1 MMOL/L (ref 3.5–5.1)
PROT SERPL-MCNC: 6.1 GM/DL (ref 5.8–7.6)
PROTHROMBIN TIME: 19.7 SECONDS (ref 12.5–14.5)
RBC # BLD AUTO: 4.51 X10(6)/MCL (ref 4.7–6.1)
RBC MORPH BLD: NORMAL
SAMPLE SITE BLOOD GAS (OHS): ABNORMAL
SAMPLE SITE BLOOD GAS (OHS): ABNORMAL
SAO2 % BLDA: 89 %
SAO2 % BLDA: 91 %
SODIUM BLOOD GAS (OHS): 134 MMOL/L (ref 137–145)
SODIUM BLOOD GAS (OHS): 135 MMOL/L (ref 137–145)
SODIUM SERPL-SCNC: 141 MMOL/L (ref 132–146)
VANCOMYCIN SERPL-MCNC: 14.6 UG/ML (ref 15–20)
WBC # SPEC AUTO: 8.6 X10(3)/MCL (ref 4.5–11.5)

## 2024-01-03 PROCEDURE — 85610 PROTHROMBIN TIME: CPT | Performed by: NURSE PRACTITIONER

## 2024-01-03 PROCEDURE — 63600175 PHARM REV CODE 636 W HCPCS: Performed by: STUDENT IN AN ORGANIZED HEALTH CARE EDUCATION/TRAINING PROGRAM

## 2024-01-03 PROCEDURE — 27100171 HC OXYGEN HIGH FLOW UP TO 24 HOURS

## 2024-01-03 PROCEDURE — 63600175 PHARM REV CODE 636 W HCPCS: Mod: JZ,JG | Performed by: STUDENT IN AN ORGANIZED HEALTH CARE EDUCATION/TRAINING PROGRAM

## 2024-01-03 PROCEDURE — 85730 THROMBOPLASTIN TIME PARTIAL: CPT | Performed by: NURSE PRACTITIONER

## 2024-01-03 PROCEDURE — 99900035 HC TECH TIME PER 15 MIN (STAT)

## 2024-01-03 PROCEDURE — 36600 WITHDRAWAL OF ARTERIAL BLOOD: CPT

## 2024-01-03 PROCEDURE — 80202 ASSAY OF VANCOMYCIN: CPT | Performed by: INTERNAL MEDICINE

## 2024-01-03 PROCEDURE — 94660 CPAP INITIATION&MGMT: CPT

## 2024-01-03 PROCEDURE — 63600175 PHARM REV CODE 636 W HCPCS: Performed by: INTERNAL MEDICINE

## 2024-01-03 PROCEDURE — 93010 ELECTROCARDIOGRAM REPORT: CPT | Mod: ,,, | Performed by: INTERNAL MEDICINE

## 2024-01-03 PROCEDURE — 82803 BLOOD GASES ANY COMBINATION: CPT

## 2024-01-03 PROCEDURE — 85027 COMPLETE CBC AUTOMATED: CPT | Performed by: STUDENT IN AN ORGANIZED HEALTH CARE EDUCATION/TRAINING PROGRAM

## 2024-01-03 PROCEDURE — 20000000 HC ICU ROOM

## 2024-01-03 PROCEDURE — 93005 ELECTROCARDIOGRAM TRACING: CPT

## 2024-01-03 PROCEDURE — 80053 COMPREHEN METABOLIC PANEL: CPT | Performed by: STUDENT IN AN ORGANIZED HEALTH CARE EDUCATION/TRAINING PROGRAM

## 2024-01-03 PROCEDURE — 94761 N-INVAS EAR/PLS OXIMETRY MLT: CPT

## 2024-01-03 PROCEDURE — 25000003 PHARM REV CODE 250: Performed by: STUDENT IN AN ORGANIZED HEALTH CARE EDUCATION/TRAINING PROGRAM

## 2024-01-03 PROCEDURE — 94640 AIRWAY INHALATION TREATMENT: CPT

## 2024-01-03 PROCEDURE — 63600175 PHARM REV CODE 636 W HCPCS: Performed by: NURSE PRACTITIONER

## 2024-01-03 PROCEDURE — 83735 ASSAY OF MAGNESIUM: CPT | Performed by: STUDENT IN AN ORGANIZED HEALTH CARE EDUCATION/TRAINING PROGRAM

## 2024-01-03 PROCEDURE — 5A09357 ASSISTANCE WITH RESPIRATORY VENTILATION, LESS THAN 24 CONSECUTIVE HOURS, CONTINUOUS POSITIVE AIRWAY PRESSURE: ICD-10-PCS | Performed by: INTERNAL MEDICINE

## 2024-01-03 PROCEDURE — 84100 ASSAY OF PHOSPHORUS: CPT | Performed by: STUDENT IN AN ORGANIZED HEALTH CARE EDUCATION/TRAINING PROGRAM

## 2024-01-03 PROCEDURE — 25000242 PHARM REV CODE 250 ALT 637 W/ HCPCS: Performed by: NURSE PRACTITIONER

## 2024-01-03 PROCEDURE — 27000190 HC CPAP FULL FACE MASK W/VALVE

## 2024-01-03 PROCEDURE — 99900031 HC PATIENT EDUCATION (STAT)

## 2024-01-03 PROCEDURE — 85730 THROMBOPLASTIN TIME PARTIAL: CPT | Performed by: INTERNAL MEDICINE

## 2024-01-03 RX ORDER — MORPHINE SULFATE 4 MG/ML
INJECTION, SOLUTION INTRAMUSCULAR; INTRAVENOUS
Status: DISPENSED
Start: 2024-01-03 | End: 2024-01-03

## 2024-01-03 RX ORDER — MORPHINE SULFATE 4 MG/ML
1 INJECTION, SOLUTION INTRAMUSCULAR; INTRAVENOUS ONCE
Status: COMPLETED | OUTPATIENT
Start: 2024-01-03 | End: 2024-01-03

## 2024-01-03 RX ORDER — FUROSEMIDE 10 MG/ML
40 INJECTION INTRAMUSCULAR; INTRAVENOUS ONCE
Status: COMPLETED | OUTPATIENT
Start: 2024-01-03 | End: 2024-01-03

## 2024-01-03 RX ORDER — HEPARIN SODIUM,PORCINE/D5W 25000/250
0-40 INTRAVENOUS SOLUTION INTRAVENOUS CONTINUOUS
Status: DISCONTINUED | OUTPATIENT
Start: 2024-01-03 | End: 2024-01-05

## 2024-01-03 RX ORDER — MORPHINE SULFATE 4 MG/ML
1 INJECTION, SOLUTION INTRAMUSCULAR; INTRAVENOUS EVERY 8 HOURS PRN
Status: DISCONTINUED | OUTPATIENT
Start: 2024-01-03 | End: 2024-01-11 | Stop reason: HOSPADM

## 2024-01-03 RX ADMIN — HEPARIN SODIUM 12 UNITS/KG/HR: 10000 INJECTION, SOLUTION INTRAVENOUS at 12:01

## 2024-01-03 RX ADMIN — MUPIROCIN: 20 OINTMENT TOPICAL at 09:01

## 2024-01-03 RX ADMIN — AMIODARONE HYDROCHLORIDE 0.5 MG/MIN: 1.8 INJECTION, SOLUTION INTRAVENOUS at 11:01

## 2024-01-03 RX ADMIN — CEFEPIME 500 MG: 1 INJECTION, POWDER, FOR SOLUTION INTRAMUSCULAR; INTRAVENOUS at 11:01

## 2024-01-03 RX ADMIN — AMIODARONE HYDROCHLORIDE 150 MG: 1.5 INJECTION, SOLUTION INTRAVENOUS at 05:01

## 2024-01-03 RX ADMIN — FUROSEMIDE 40 MG: 10 INJECTION, SOLUTION INTRAVENOUS at 03:01

## 2024-01-03 RX ADMIN — NOREPINEPHRINE BITARTRATE 0.14 MCG/KG/MIN: 8 INJECTION, SOLUTION INTRAVENOUS at 11:01

## 2024-01-03 RX ADMIN — AMIODARONE HYDROCHLORIDE 1 MG/MIN: 1.8 INJECTION, SOLUTION INTRAVENOUS at 05:01

## 2024-01-03 RX ADMIN — MORPHINE SULFATE 1 MG: 4 INJECTION, SOLUTION INTRAMUSCULAR; INTRAVENOUS at 06:01

## 2024-01-03 RX ADMIN — IPRATROPIUM BROMIDE AND ALBUTEROL SULFATE 3 ML: .5; 3 SOLUTION RESPIRATORY (INHALATION) at 02:01

## 2024-01-03 RX ADMIN — NOREPINEPHRINE BITARTRATE 0.17 MCG/KG/MIN: 8 INJECTION, SOLUTION INTRAVENOUS at 11:01

## 2024-01-03 RX ADMIN — HEPARIN SODIUM 5000 UNITS: 5000 INJECTION, SOLUTION INTRAVENOUS; SUBCUTANEOUS at 05:01

## 2024-01-03 RX ADMIN — VANCOMYCIN HYDROCHLORIDE 1250 MG: 1.25 INJECTION, POWDER, LYOPHILIZED, FOR SOLUTION INTRAVENOUS at 09:01

## 2024-01-03 RX ADMIN — MORPHINE SULFATE 1 MG: 4 INJECTION, SOLUTION INTRAMUSCULAR; INTRAVENOUS at 05:01

## 2024-01-03 RX ADMIN — AMIODARONE HYDROCHLORIDE 0.5 MG/MIN: 1.8 INJECTION, SOLUTION INTRAVENOUS at 09:01

## 2024-01-03 NOTE — NURSING
Nurses Note -- 4 Eyes      1/3/2024   4:35 PM      Skin assessed during: Daily Assessment      [x] No Altered Skin Integrity Present    [x]Prevention Measures Documented      [] Yes- Altered Skin Integrity Present or Discovered   [] LDA Added if Not in Epic (Describe Wound)   [] New Altered Skin Integrity was Present on Admit and Documented in LDA   [] Wound Image Taken    Wound Care Consulted? No    Attending Nurse:  Eryn Chua RN/Staff Member:   Fina Gurrola RN

## 2024-01-03 NOTE — PROGRESS NOTES
"Pharmacokinetic Initial Assessment: IV Vancomycin    Assessment/Plan:    Initiate intravenous vancomycin with loading dose of 1500 mg once with subsequent doses when random concentrations are less than 20 mcg/mL  Desired empiric serum trough concentration is 10 to 20 mcg/mL  Draw vancomycin random level on 1/3/24 at 2000.  Pharmacy will continue to follow and monitor vancomycin.      Patient brief summary:  Francis Montero is a 89 y.o. male initiated on antimicrobial therapy with IV Vancomycin for treatment of suspected bacteremia    Drug Allergies:   Review of patient's allergies indicates:  No Known Allergies    Actual Body Weight:   77.1 kg    Renal Function:   Estimated Creatinine Clearance: 15.3 mL/min (A) (based on SCr of 2.83 mg/dL (H)).,     Dialysis Method (if applicable):  N/A    CBC (last 72 hours):  Recent Labs   Lab Result Units 01/02/24  1042 01/02/24  1637   WBC x10(3)/mcL 13.89  13.89* 7.77  7.77   Hgb g/dL 11.4* 11.0*   Hct % 37.4* 36.0*   Platelet x10(3)/mcL 194 167   Basophils % % 1  --        Metabolic Panel (last 72 hours):  Recent Labs   Lab Result Units 01/02/24  1042 01/02/24  1305 01/02/24  1635   Sodium Level mmol/L 142  --  141   Potassium Level mmol/L 5.7*  --  5.5*   Chloride mmol/L 113*  --  114*   Carbon Dioxide mmol/L 16*  --  12*   Glucose Level mg/dL 84  --  113   Glucose, UA   --  Normal  --    Blood Urea Nitrogen mg/dL 83.4*  --  80.4*   Creatinine mg/dL 3.10*  --  2.83*   Albumin Level g/dL 2.5*  --  2.2*   Bilirubin Total mg/dL 0.7  --  0.4   Alkaline Phosphatase unit/L 36*  --  34*   Aspartate Aminotransferase unit/L 33  --  37*   Alanine Aminotransferase unit/L 21  --  20   Magnesium Level mg/dL 1.50*  --  2.00   Phosphorus Level mg/dL  --   --  3.4       Drug levels (last 3 results):  No results for input(s): "VANCOMYCINRA", "VANCORANDOM", "VANCOMYCINPE", "VANCOPEAK", "VANCOMYCINTR", "VANCOTROUGH" in the last 72 hours.    Microbiologic Results:  Microbiology Results (last 7 " days)       Procedure Component Value Units Date/Time    BCID2 Panel [[id number]] Collected: 01/02/24 1156    Order Status: Sent Specimen: Blood Updated: 01/02/24 2138    Blood culture #2 **CANNOT BE ORDERED STAT** [[id number]]  (Abnormal) Collected: 01/02/24 1156    Order Status: Completed Specimen: Blood Updated: 01/02/24 2137     GRAM STAIN Gram Positive Cocci, probable Streptococcus      Seen in gram stain of broth only      1 of 1 Pediatric bottle positive    Urine culture [[id number]] Collected: 01/02/24 1305    Order Status: Sent Specimen: Urine Updated: 01/02/24 1341    Blood culture #1 **CANNOT BE ORDERED STAT** [[id number]] Collected: 01/02/24 1156    Order Status: Resulted Specimen: Blood Updated: 01/02/24 1210

## 2024-01-03 NOTE — H&P
[facility] - Emergency Dept  Pulmonary Critical Care Note    Patient Name: Francis Montero  MRN: [id number]  Admission Date: 1/2/2024  Hospital Length of Stay: 0 days  Code Status: Full Code  Attending Provider: Bernardino Ordoñez MD  Primary Care Provider: No primary care provider on file.     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  Patient was originally started on IV antibiotics however during his stay began to become progressively hypotensive.  Patient received 3 L fluid boluses which did not improve patient's blood pressure to a map greater than 65 at which point patient was initiated on Levophed for hemodynamic support.  Of note, patient is visiting [place] and reports that he has been feeling somewhat ill the last 2 days.  Admits to dysuria, fatigue, myalgia, shortness a breath.  Reports no chest pain, palpitations, fevers, chills, nausea, vomiting.  ICU was consulted for upgrade.    Hospital Course/Significant events:  01/01/2024:  Admitted to [facility]  01/02/2024:  Upgraded to ICU, initiated on Levophed    24 Hour Interval History:  Pending    Past Medical History:   Diagnosis Date    CKD (chronic kidney disease)     COPD (chronic obstructive pulmonary disease)     HLD (hyperlipidemia)     HTN (hypertension)     Merkel cell carcinoma     Paroxysmal A-fib     Prostate cancer     PUD (peptic ulcer disease)        Past Surgical History:   Procedure Laterality Date    INGUINAL HERNIA REPAIR      PROSTATE SURGERY      ROTATOR CUFF REPAIR         Social History     Socioeconomic History    Marital status:            Current Outpatient Medications   Medication  Instructions    atorvastatin (LIPITOR) 10 mg, Oral, Daily    calcitRIOL (ROCALTROL) 0.25 mcg, Oral, Daily, Takes every other day    gabapentin (NEURONTIN) 300 mg, Oral, Nightly    metoprolol tartrate (LOPRESSOR) 25 mg, Oral, 2 times daily    NON FORMULARY MEDICATION 850 mg, Oral, Daily, Umary       Current Inpatient Medications   [START ON 1/3/2024] atorvastatin  10 mg Oral Daily    [START ON 1/3/2024] calcitRIOL  0.25 mcg Oral Daily    ceFEPime (MAXIPIME) IVPB  500 mg Intravenous Q24H    gabapentin  300 mg Oral QHS    heparin (porcine)  5,000 Units Subcutaneous Q8H    metoprolol tartrate  25 mg Oral BID    mupirocin   Nasal BID    NORepinephrine 8 mg        vancomycin (VANCOCIN) IV (PEDS and ADULTS)  20 mg/kg Intravenous Once       Current Intravenous Infusions   sodium chloride 0.9% 5 mL/hr at 01/02/24 2355    albuterol 0.083% 10 mg/hr (01/02/24 1256)    NORepinephrine bitartrate-D5W 0.14 mcg/kg/min (01/02/24 2355)         Review of Systems   Constitutional:  Positive for chills and fever. Negative for weight loss.   HENT:  Negative for congestion and sinus pain.    Eyes:  Negative for blurred vision.   Respiratory:  Negative for cough, hemoptysis and wheezing.    Cardiovascular:  Negative for chest pain and palpitations.   Gastrointestinal:  Negative for abdominal pain, nausea and vomiting.   Genitourinary:  Positive for dysuria, frequency and urgency. Negative for hematuria.   Musculoskeletal:  Positive for back pain and neck pain. Negative for myalgias.   Skin:  Negative for itching and rash.   Neurological:  Negative for dizziness, tingling, sensory change and headaches.          Objective:       Intake/Output Summary (Last 24 hours) at 1/2/2024 2358  Last data filed at 1/2/2024 2355  Gross per 24 hour   Intake 1856.48 ml   Output 1405 ml   Net 451.48 ml         Vital Signs (Most Recent):  Temp: 98.6 °F (37 °C) (01/02/24 2145)  Pulse: (!) 133 (01/02/24 2300)  Resp: (!) 33 (01/02/24 2315)  BP: (!) 84/49  (01/02/24 2315)  SpO2: (!) 85 % (01/02/24 2315)  Body mass index is 26.6 kg/m².  Weight: 72.5 kg (159 lb 13.3 oz) Vital Signs (24h Range):  Temp:  [98.2 °F (36.8 °C)-98.6 °F (37 °C)] 98.6 °F (37 °C)  Pulse:  [] 133  Resp:  [18-33] 33  SpO2:  [85 %-100 %] 85 %  BP: ()/(37-87) 84/49     Physical Exam  Constitutional:       General: He is not in acute distress.     Appearance: He is ill-appearing.   HENT:      Head: Normocephalic and atraumatic.      Comments: Oxymask in place     Mouth/Throat:      Mouth: Mucous membranes are moist.      Pharynx: Oropharynx is clear.   Eyes:      Extraocular Movements: Extraocular movements intact.      Conjunctiva/sclera: Conjunctivae normal.      Pupils: Pupils are equal, round, and reactive to light.   Cardiovascular:      Rate and Rhythm: Tachycardia present.      Pulses: Normal pulses.      Heart sounds: Murmur heard.      No friction rub. No gallop.      Comments: Systolic murmur noted  Pulmonary:      Effort: Pulmonary effort is normal.      Breath sounds: No stridor. Rhonchi and rales present. No wheezing.   Abdominal:      Palpations: Abdomen is soft.      Tenderness: There is no abdominal tenderness. There is no guarding or rebound.   Musculoskeletal:         General: Swelling present. No deformity or signs of injury.   Neurological:      General: No focal deficit present.      Mental Status: He is alert and oriented to person, place, and time.      Cranial Nerves: No cranial nerve deficit.      Sensory: No sensory deficit.   Psychiatric:         Mood and Affect: Mood normal.           Lines/Drains/Airways       Drain  Duration                  Suprapubic Catheter 16 Fr. -- days              Peripheral Intravenous Line  Duration                  Peripheral IV - Single Lumen 01/02/24 1006 18 G Right Antecubital <1 day         Peripheral IV - Single Lumen 01/02/24 1849 20 G Left;Posterior Hand <1 day                    Significant Labs:    Lab Results   Component  Value Date    WBC 7.77 01/02/2024    WBC 7.77 01/02/2024    HGB 11.5 (L) 01/02/2024    HCT 37.2 (L) 01/02/2024    MCV 89.1 01/02/2024     01/02/2024           BMP  Lab Results   Component Value Date     01/02/2024    K 4.9 01/02/2024    CHLORIDE 115 (H) 01/02/2024    CO2 14 (L) 01/02/2024    BUN 80.9 (H) 01/02/2024    CREATININE 2.65 (H) 01/02/2024    CALCIUM 8.5 (L) 01/02/2024         ABG  Recent Labs   Lab 01/02/24 2059   PH 7.340*   PO2 58.0*   PCO2 32.0*   HCO3 17.3*   POCBASEDEF -7.60*       Mechanical Ventilation Support:         Significant Imaging:  I have reviewed the pertinent imaging within the past 24 hours.        Assessment/Plan:     Assessment  Septic Shock  Acute Cystitis without hematuria  - hx of urethral stent  Community Acquired Pneumonia  Bacteremia (gram + cocci)  Acute Hypoxic on chronic hypercarbic respiratory failure  - hx of COPD  - hx of asbestosis, pulmonary fibrosis?  Right pleural effusion  Acute renal failure  - hx of CKD with unknown baseline  Metabolic Acidosis  - lactic acidosis, respiratory compensation  NSTEMI  HFpEF, decompensated  Hyperkalemia  Hypomagnesemia  Paroxysmal Afib  Hx of prostate cancer  Hx of merkel cell carcinoma      Plan  - Upgrade to ICU, patient is critically ill  - continue levophed for hemodynamic support, titrate for goal map of 65  -should patient continue to require will need central access in a.m.  -continue trending troponins  -cardiology following, appreciate their assistance   -TTE earlier today with EF of 60% with diastolic failure  -agree with trial of Lasix, we will repeat dose and keep close monitoring of patient's hemodynamics   -urology consulted, appreciate their assistance   -nephrology consulted, appreciate their assistance   -pending repeat laboratory evaluation  -broaden antibiotic coverage to vancomycin/cefepime (day #1)   -cultures pending, can deescalate based on sensitivities   -continue with supplement oxygen, can titrate  for goal SpO2 greater than 88-92%   -ECG from today appears in sinus  -p.r.n.  Nebulized treatments for wheezing  -continue with strict I's and O's  -patient's family discussing potential [place], we will discuss with case management in the morning if this is possible      CODE STATUS: Full Code   Consults: Urology, Nephrology, Cardiology  Access: Peripheral  Drains: suprapubic catheter  Analgesia/Sedation:  none    Antibiotics: Vanco/Cefepime  Diet: Diet Adult Regular  Fluids: none  Pressors: Levophed   Oxygenation: oxymask at 12L  DVT Prophylaxis: Heparin  GI Prophylaxis: none  I&Os: pending No intake/output data recorded.        32 minutes of critical care was time spent personally by me on the following activities: development of treatment plan with patient or surrogate and bedside caregivers, discussions with consultants, evaluation of patient's response to treatment, examination of patient, ordering and performing treatments and interventions, ordering and review of laboratory studies, ordering and review of radiographic studies, pulse oximetry, re-evaluation of patient's condition.  This critical care time did not overlap with that of any other provider or involve time for any procedures.     Manny Sawant, DO  Pulmonary Critical Care Medicine  [facility] - Emergency Dept  DOS: 01/02/2024

## 2024-01-03 NOTE — PROGRESS NOTES
[facility] - [facility]    Cardiology  Progress Note    Patient Name: Francis Montero  MRN: [id number]  Admission Date: 1/2/2024  Hospital Length of Stay: 1 days  Code Status: Full Code   Attending Physician: Bernardino Ordoñez MD   Primary Care Physician: Lisa, Primary Doctor  Expected Discharge Date:   Principal Problem:SOB (shortness of breath)    Subjective:   Consultation Reason: Abnormal Troponin & AF RVR     HPI:   Mr. Montero is a 89 y/o male,  unknown to [facility], who presented to Ed with c/o SOB. Admit VS- BP 70/30, P RA sat 85%. He was given 200ml NS enroute. In Ed he was given another 1.5L NS bolus with improvement in BP. Lab significant for WBC 13.89, K+ 5.7, CO2 16, BUN/creatinine 83.4/3.10, Mg 1.50, BNP 1970, troponin 0.236, Venous lactate 3.3. EKG SR with PACs. CXR consistent with pulmonary congestion and a large bilateral pleural effusions. Echo revealing EF 60-65% with mild to  moderate AS, mild MS and mild to moderate TR. Blood and urine cultures obtained and patient started on antibiotics. [facility] has been consulted for elevated troponin    Hospital Course:  1.3.24: NAD Noted. On BIPAP. He is awake but confused, requiring Mittens. AF RVR. On Amiodarone Infusion, He has a history of AF. Hypotensive Requiring Levophed Support. Legs are Warm.       PMH: Afib/Eliquis, COPD, asbestosis, Merkel cell carcinoma of left ear anxiety, esophagitis, esophageal varices, hiatal hernia, merkel cell carcinoma, prostate cancer, chronic UTIs, HLD, PUD  PSH: surgical resection with adjuvant radiotherapy to Left ear,  EGD, prostate surgery,  open shoulder rotator cuff repair  Family History:   Social History: former  smoker 40 pack years,      Previous Cardiac Diagnostics:   Echocardiogram (1.2.24):  Left Ventricle: The left ventricle is normal in size. Normal wall thickness. Normal wall motion. There is normal systolic function with a visually estimated ejection fraction of 60 - 65%. Diastolic function cannot be  reliably determined in the presence of mitral valve disease. Inconclusive left ventricular filling pressure.  Right Ventricle: Normal right ventricular cavity size. Systolic function is normal.  Aortic Valve: There is mild to moderate stenosis. Aortic valve area by VTI is 1.23 cm². Aortic valve peak velocity is 2.63 m/s. Mean gradient is 18 mmHg. The dimensionless index is 0.39. There is trace aortic regurgitation.  Mitral Valve: There is severe mitral annular calcification present. There is mild stenosis. The mean pressure gradient across the mitral valve is 6 mmHg at a heart rate of  bpm.  Tricuspid Valve: There is mild to moderate regurgitation. The estimated PA systolic pressure is at least 38 mmHg.     Echocardiogram (2.22.22):  Left Ventricle: Left ventricle is normal in size and function. Normal   wall thickness.   Right Ventricle: Right ventricle is normal in size and function. Normal   wall thickness.   Mitral Valve: Mitral valve is normal in structure and function. Mildly   calcified leaflets.   Tricuspid Valve: Tricuspid valve is normal size and function.   Aortic Valve: Aortic valve is normal in structure and function.   Moderately calcified cusps.      Review of patient's allergies indicates:  No Known Allergies    Review of Systems   Unable to perform ROS: Acuity of condition     Objective:     Vital Signs (Most Recent):  Temp: 100 °F (37.8 °C) (01/03/24 0800)  Pulse: 78 (01/03/24 1015)  Resp: (!) 25 (01/03/24 1015)  BP: (!) 100/59 (01/03/24 1015)  SpO2: (!) 92 % (01/03/24 1015) Vital Signs (24h Range):  Temp:  [98.4 °F (36.9 °C)-100 °F (37.8 °C)] 100 °F (37.8 °C)  Pulse:  [] 78  Resp:  [16-56] 25  SpO2:  [84 %-100 %] 92 %  BP: ()/(37-80) 100/59     Weight: 72.5 kg (159 lb 13.3 oz)  Body mass index is 26.6 kg/m².    SpO2: (!) 92 %         Intake/Output Summary (Last 24 hours) at 1/3/2024 1025  Last data filed at 1/3/2024 1022  Gross per 24 hour   Intake 2761.05 ml   Output 2775 ml   Net  -13.95 ml       Lines/Drains/Airways       Drain  Duration                  Suprapubic Catheter 16 Fr. -- days              Peripheral Intravenous Line  Duration                  Peripheral IV - Single Lumen 01/02/24 1006 18 G Right Antecubital 1 day         Peripheral IV - Single Lumen 01/03/24 0441 20 G Anterior;Right Upper Arm <1 day                  Significant Labs:   Recent Results (from the past 72 hour(s))   Protime-INR    Collection Time: 01/02/24 10:42 AM   Result Value Ref Range    PT 20.7 (H) 12.5 - 14.5 seconds    INR 1.8 (H) <=1.3   APTT    Collection Time: 01/02/24 10:42 AM   Result Value Ref Range    PTT 35.8 (H) 23.2 - 33.7 seconds   Comprehensive metabolic panel    Collection Time: 01/02/24 10:42 AM   Result Value Ref Range    Sodium Level 142 132 - 146 mmol/L    Potassium Level 5.7 (H) 3.5 - 5.1 mmol/L    Chloride 113 (H) 98 - 111 mmol/L    Carbon Dioxide 16 (L) 23 - 31 mmol/L    Glucose Level 84 75 - 121 mg/dL    Blood Urea Nitrogen 83.4 (H) 8.4 - 25.7 mg/dL    Creatinine 3.10 (H) 0.73 - 1.18 mg/dL    Calcium Level Total 8.4 (L) 8.8 - 10.0 mg/dL    Protein Total 5.7 (L) 5.8 - 7.6 gm/dL    Albumin Level 2.5 (L) 3.4 - 4.8 g/dL    Globulin 3.2 2.4 - 3.5 gm/dL    Albumin/Globulin Ratio 0.8 (L) 1.1 - 2.0 ratio    Bilirubin Total 0.7 <=1.5 mg/dL    Alkaline Phosphatase 36 (L) 40 - 150 unit/L    Alanine Aminotransferase 21 0 - 55 unit/L    Aspartate Aminotransferase 33 5 - 34 unit/L    eGFR 18 mls/min/1.73/m2   Magnesium    Collection Time: 01/02/24 10:42 AM   Result Value Ref Range    Magnesium Level 1.50 (L) 1.60 - 2.60 mg/dL   Type & Screen    Collection Time: 01/02/24 10:42 AM   Result Value Ref Range    Group & Rh AB POS     Indirect Smitha GEL NEG     Specimen Outdate 01/05/2024 23:59    Troponin I    Collection Time: 01/02/24 10:42 AM   Result Value Ref Range    Troponin-I 0.236 (H) 0.000 - 0.045 ng/mL   CBC with Differential    Collection Time: 01/02/24 10:42 AM   Result Value Ref Range    WBC  13.89 (H) 4.50 - 11.50 x10(3)/mcL    RBC 4.19 (L) 4.70 - 6.10 x10(6)/mcL    Hgb 11.4 (L) 14.0 - 18.0 g/dL    Hct 37.4 (L) 42.0 - 52.0 %    MCV 89.3 80.0 - 94.0 fL    MCH 27.2 27.0 - 31.0 pg    MCHC 30.5 (L) 33.0 - 36.0 g/dL    RDW 16.0 11.5 - 17.0 %    Platelet 194 130 - 400 x10(3)/mcL    MPV 11.6 (H) 7.4 - 10.4 fL    NRBC% 0.0 %   Manual Differential    Collection Time: 01/02/24 10:42 AM   Result Value Ref Range    WBC 13.89 x10(3)/mcL    Neutrophils % 76 %    Lymphs % 8 %    Basophils % 1 %    Metamyelocytes % 7 (H) <=0 %    Myelocytes % 7 (H) <=0 %    Neutrophils Abs 10.5564 (H) 2.1 - 9.2 x10(3)/mcL    Lymphs Abs 1.1112 0.6 - 4.6 x10(3)/mcL    Basophils Abs 0.1389 0 - 0.2 x10(3)/mcL    Platelets Normal Normal, Adequate    RBC Morph Abnormal (A) Normal    Poikilocytosis 1+ (A) (none)    [place] Cells 1+ (A) (none)   Brain natriuretic peptide    Collection Time: 01/02/24 10:42 AM   Result Value Ref Range    Natriuretic Peptide 1,970.5 (H) <=100.0 pg/mL   Path Review, Peripheral Smear    Collection Time: 01/02/24 10:42 AM   Result Value Ref Range    Peripheral Smear Evaluation       The red blood cells are unremarkable. The neutrophils show a mild left shift in maturation. The lymphocytes and platelets are unremarkable.    Eduardo Burk M.D., Ph.D.    ABORH RETYPE    Collection Time: 01/02/24 11:05 AM   Result Value Ref Range    ABOR Retype AB POS    RT Blood Gas    Collection Time: 01/02/24 11:18 AM   Result Value Ref Range    Sample Type Arterial Blood     Sample site Left Radial Artery     Drawn by sd rrt     pH, Blood gas 7.300 (L) 7.350 - 7.450    pCO2, Blood gas 32.0 (L) 35.0 - 45.0 mmHg    pO2, Blood gas 63.0 (L) 80.0 - 100.0 mmHg    Sodium, Blood Gas 135 (L) 137 - 145 mmol/L    Potassium, Blood Gas 5.2 (H) 3.5 - 5.0 mmol/L    Calcium Level Ionized 1.17 1.12 - 1.23 mmol/L    TOC2, Blood gas 16.7 mmol/L    Base Excess, Blood gas -9.70 (L) -2.00 - 2.00 mmol/L    sO2, Blood gas 89.1 %    HCO3, Blood gas 15.7 (L)  22.0 - 26.0 mmol/L    THb, Blood gas 11.1 (L) 12 - 16 g/dL    O2 Hb, Blood Gas 91.3 (L) 94.0 - 97.0 %    CO Hgb 0.5 0.5 - 1.5 %    Met Hgb 0.0 (L) 0.4 - 1.5 %    Allens Test Yes     Oxygen Device, Blood gas Cannula     LPM 3.5    Blood culture #1 **CANNOT BE ORDERED STAT**    Collection Time: 01/02/24 11:56 AM    Specimen: Blood   Result Value Ref Range    CULTURE, BLOOD ([facility]) Identification and Susceptibility To Follow     CULTURE, BLOOD ([facility]) Gram-positive coccus probable strep (A)     GRAM STAIN Gram Positive Cocci, probable Streptococcus (AA)     GRAM STAIN Seen in gram stain of broth only (AA)     GRAM STAIN 1 of 1 Pediatric bottle positive (AA)    Blood culture #2 **CANNOT BE ORDERED STAT**    Collection Time: 01/02/24 11:56 AM    Specimen: Blood   Result Value Ref Range    GRAM STAIN Gram Positive Cocci, probable Streptococcus (AA)     GRAM STAIN Seen in gram stain of broth only (AA)     GRAM STAIN 1 of 1 Pediatric bottle positive (AA)     GRAM STAIN 2 of 2 bottles positive (AA)    Lactic acid, plasma    Collection Time: 01/02/24 11:56 AM   Result Value Ref Range    Lactic Acid Level 3.3 (H) 0.5 - 2.2 mmol/L   BCID2 Panel    Collection Time: 01/02/24 11:56 AM    Specimen: Blood   Result Value Ref Range    CTX-M (ESBL ) N/A Not Detected, N/A    IMP (Cabapenemase ) N/A Not Detected, N/A    KPC resistance gene (Carbapenemase ) N/A Not Detected, N/A    mcr-1 N/A Not Detected, N/A    mecA ID N/A Not Detected, N/A    mecA/C and MREJ (MRSA) gene N/A Not Detected, N/A    NDM (Carbapenemase ) N/A Not Detected, N/A    OXA-48-like (Carbapenemase ) N/A Not Detected, N/A    Nurys/B (VRE gene) N/A Not Detected, N/A    VIM (Carbapenemase ) N/A Not Detected, N/A    Enterococcus faecalis Not Detected Not Detected    Enterococcus faecium Not Detected Not Detected    Listeria monocytogenes Not Detected Not Detected    Staphylococcus spp. Not Detected Not Detected    Staphylococcus  aureus Not Detected Not Detected    Staphylococcus epidermidis Not Detected Not Detected    Staphylococcus lugdunensis Not Detected Not Detected    Streptococcus spp. Detected (A) Not Detected    Streptococcus agalactiae (Group B) Not Detected Not Detected    Streptococcus pneumoniae Detected (A) Not Detected    Streptococcus pyogenes (Group A) Not Detected Not Detected    Acinetobacter calcoaceticus/baumannii complex Not Detected Not Detected    Bacteroides fragilis Not Detected Not Detected    Enterobacterales Not Detected Not Detected    Enterobacter cloacae complex Not Detected Not Detected    Escherichia coli Not Detected Not Detected    Klebsiella aerogenes Not Detected Not Detected    Klebsiella oxytoca Not Detected Not Detected    Klebsiella pneumoniae group Not Detected Not Detected    Proteus spp. Not Detected Not Detected    Salmonella spp. Not Detected Not Detected    Serratia marcescens Not Detected Not Detected    Haemophilus influenzae Not Detected Not Detected    Neisseria meningitidis Not Detected Not Detected    Pseudomonas aeruginosa Not Detected Not Detected    Stenotrophomonas maltophilia Not Detected Not Detected    Candida albicans Not Detected Not Detected    Candida auris Not Detected Not Detected    Candida glabrata Not Detected Not Detected    Candida krusei Not Detected Not Detected    Candida parapsilosis Not Detected Not Detected    Candida tropicalis Not Detected Not Detected    Cryptococcus neoformans/gattii Not Detected Not Detected   Echo    Collection Time: 01/02/24 12:00 PM   Result Value Ref Range    BSA 1.88 m2    Clifford's Biplane MOD Ejection Fraction 60 %    LVOT stroke volume 65.31 cm3    LVIDd 4.30 3.5 - 6.0 cm    LV Systolic Volume 30.10 mL    LV Systolic Volume Index 16.3 mL/m2    LVIDs 2.82 2.1 - 4.0 cm    LV Diastolic Volume 83.10 mL    LV Diastolic Volume Index 44.92 mL/m2    IVS 1.04 0.6 - 1.1 cm    LVOT diameter 2.00 cm    LVOT area 3.1 cm2    FS 34 28 - 44 %    Left  Ventricle Relative Wall Thickness 0.50 cm    Posterior Wall 1.07 0.6 - 1.1 cm    LV mass 153.58 g    LV Mass Index 83 g/m2    MV Peak E Italo 1.43 m/s    TDI LATERAL 0.09 m/s    TDI SEPTAL 0.08 m/s    E/E' ratio 16.82 m/s    MV Peak A Italo 1.64 m/s    TR Max Italo 3.08 m/s    E/A ratio 0.87     E wave deceleration time 301.00 msec    LV SEPTAL E/E' RATIO 17.88 m/s    LV LATERAL E/E' RATIO 15.89 m/s    LVOT peak italo 1.02 m/s    Left Ventricular Outflow Tract Mean Velocity 0.74 cm/s    Left Ventricular Outflow Tract Mean Gradient 2.00 mmHg    RVDD 3.27 cm    TAPSE 2.75 cm    LA size 3.70 cm    LA volume (mod) 57.20 cm3    LA Volume Index (Mod) 30.9 mL/m2    AV mean gradient 18 mmHg    AV peak gradient 28 mmHg    Ao peak italo 2.63 m/s    Ao VTI 53.20 cm    LVOT peak VTI 20.80 cm    AV valve area 1.23 cm²    AV Velocity Ratio 0.39     AV index (prosthetic) 0.39     BENJAMIN by Velocity Ratio 1.22 cm²    MV mean gradient 6 mmHg    MV peak gradient 12 mmHg    MV valve area by continuity eq 1.82 cm2    MV VTI 35.8 cm    Triscuspid Valve Regurgitation Peak Gradient 38 mmHg    Mean e' 0.09 m/s    ZLVIDS -0.93     ZLVIDD -1.78    Urinalysis, Reflex to Urine Culture    Collection Time: 01/02/24  1:05 PM    Specimen: Urine   Result Value Ref Range    Color, UA Yellow Yellow, Light-Yellow, Colorless, Straw, Dark-Yellow    Appearance, UA Turbid (A) Clear    Specific Gravity, UA 1.017 1.005 - 1.030    pH, UA 5.0 5.0 - 8.5    Protein, UA 1+ (A) Negative    Glucose, UA Normal Negative, Normal    Ketones, UA Negative Negative    Blood, UA 3+ (A) Negative    Bilirubin, UA Negative Negative    Urobilinogen, UA Normal 0.2, 1.0, Normal    Nitrites, UA Negative Negative    Leukocyte Esterase,  (A) Negative    WBC, UA 51-99 (A) None Seen, 0-2, 3-5, 0-5 /HPF    WBC Clumps, UA Few (A) None Seen, 0-5    Bacteria, UA Many (A) None Seen, Trace /HPF    Squamous Epithelial Cells, UA Trace None Seen /HPF    Mucous, UA Trace (A) None Seen /LPF    RBC,  UA 50-99 (A) None Seen, 0-2, 3-5, 0-5 /HPF   Urine culture    Collection Time: 01/02/24  1:05 PM    Specimen: Urine   Result Value Ref Range    Urine Culture >/= 100,000 colonies/ml Gram-negative Rods (A)    Lactic Acid, Plasma    Collection Time: 01/02/24  1:58 PM   Result Value Ref Range    Lactic Acid Level 4.1 (HH) 0.5 - 2.2 mmol/L   COVID/RSV/FLU A&B PCR    Collection Time: 01/02/24  2:13 PM   Result Value Ref Range    Influenza A PCR Not Detected Not Detected    Influenza B PCR Not Detected Not Detected    Respiratory Syncytial Virus PCR Not Detected Not Detected    SARS-CoV-2 PCR Not Detected Not Detected, Negative   Troponin I    Collection Time: 01/02/24  4:35 PM   Result Value Ref Range    Troponin-I 0.251 (H) 0.000 - 0.045 ng/mL   Comprehensive Metabolic Panel    Collection Time: 01/02/24  4:35 PM   Result Value Ref Range    Sodium Level 141 132 - 146 mmol/L    Potassium Level 5.5 (H) 3.5 - 5.1 mmol/L    Chloride 114 (H) 98 - 111 mmol/L    Carbon Dioxide 12 (L) 23 - 31 mmol/L    Glucose Level 113 75 - 121 mg/dL    Blood Urea Nitrogen 80.4 (H) 8.4 - 25.7 mg/dL    Creatinine 2.83 (H) 0.73 - 1.18 mg/dL    Calcium Level Total 8.1 (L) 8.8 - 10.0 mg/dL    Protein Total 5.2 (L) 5.8 - 7.6 gm/dL    Albumin Level 2.2 (L) 3.4 - 4.8 g/dL    Globulin 3.0 2.4 - 3.5 gm/dL    Albumin/Globulin Ratio 0.7 (L) 1.1 - 2.0 ratio    Bilirubin Total 0.4 <=1.5 mg/dL    Alkaline Phosphatase 34 (L) 40 - 150 unit/L    Alanine Aminotransferase 20 0 - 55 unit/L    Aspartate Aminotransferase 37 (H) 5 - 34 unit/L    eGFR 20 mls/min/1.73/m2   Magnesium    Collection Time: 01/02/24  4:35 PM   Result Value Ref Range    Magnesium Level 2.00 1.60 - 2.60 mg/dL   Phosphorus    Collection Time: 01/02/24  4:35 PM   Result Value Ref Range    Phosphorus Level 3.4 2.3 - 4.7 mg/dL   CBC with Differential    Collection Time: 01/02/24  4:37 PM   Result Value Ref Range    WBC 7.77 4.50 - 11.50 x10(3)/mcL    RBC 4.04 (L) 4.70 - 6.10 x10(6)/mcL    Hgb  11.0 (L) 14.0 - 18.0 g/dL    Hct 36.0 (L) 42.0 - 52.0 %    MCV 89.1 80.0 - 94.0 fL    MCH 27.2 27.0 - 31.0 pg    MCHC 30.6 (L) 33.0 - 36.0 g/dL    RDW 16.1 11.5 - 17.0 %    Platelet 167 130 - 400 x10(3)/mcL    MPV 11.4 (H) 7.4 - 10.4 fL    NRBC% 0.5 %   Manual Differential    Collection Time: 01/02/24  4:37 PM   Result Value Ref Range    WBC 7.77 x10(3)/mcL    Neutrophils % 74 %    Lymphs % 9 %    Metamyelocytes % 16 (H) <=0 %    Myelocytes % 1 (H) <=0 %    Neutrophils Abs 5.7498 2.1 - 9.2 x10(3)/mcL    Lymphs Abs 0.6993 0.6 - 4.6 x10(3)/mcL    Platelets Normal Normal, Adequate    RBC Morph Abnormal (A) Normal    Poikilocytosis Slight (A) (none)    [place] Cells Slight (A) (none)   Lactic Acid, Plasma    Collection Time: 01/02/24  6:52 PM   Result Value Ref Range    Lactic Acid Level 3.6 (HH) 0.5 - 2.2 mmol/L   RT Blood Gas    Collection Time: 01/02/24  8:59 PM   Result Value Ref Range    Sample Type Arterial Blood     Sample site Right Radial Artery     Drawn by RF RRT     pH, Blood gas 7.340 (L) 7.350 - 7.450    pCO2, Blood gas 32.0 (L) 35.0 - 45.0 mmHg    pO2, Blood gas 58.0 (L) 80.0 - 100.0 mmHg    Sodium, Blood Gas 136 (L) 137 - 145 mmol/L    Potassium, Blood Gas 4.9 3.5 - 5.0 mmol/L    Calcium Level Ionized 1.09 (L) 1.12 - 1.23 mmol/L    TOC2, Blood gas 18.3 mmol/L    Base Excess, Blood gas -7.60 (L) -2.00 - 2.00 mmol/L    sO2, Blood gas 87.8 %    HCO3, Blood gas 17.3 (L) 22.0 - 26.0 mmol/L    THb, Blood gas 9.7 (L) 12 - 16 g/dL    O2 Hb, Blood Gas 91.0 (L) 94.0 - 97.0 %    CO Hgb 2.7 (H) 0.5 - 1.5 %    Met Hgb 0.0 (L) 0.4 - 1.5 %    Allens Test Yes     Oxygen Device, Blood gas Face Mask     LPM 10.0    MRSA PCR    Collection Time: 01/02/24 10:07 PM   Result Value Ref Range    MRSA PCR SCRN ([facility]) Not Detected Not Detected   Troponin I    Collection Time: 01/02/24 10:34 PM   Result Value Ref Range    Troponin-I 0.290 (H) 0.000 - 0.045 ng/mL   Lactic Acid, Plasma    Collection Time: 01/02/24 10:34 PM   Result Value  Ref Range    Lactic Acid Level 2.9 (H) 0.5 - 2.2 mmol/L   Renal Function Panel    Collection Time: 01/02/24 10:34 PM   Result Value Ref Range    Sodium Level 143 132 - 146 mmol/L    Potassium Level 4.9 3.5 - 5.1 mmol/L    Chloride 115 (H) 98 - 111 mmol/L    Carbon Dioxide 14 (L) 23 - 31 mmol/L    Glucose Level 73 (L) 75 - 121 mg/dL    Blood Urea Nitrogen 80.9 (H) 8.4 - 25.7 mg/dL    Creatinine 2.65 (H) 0.73 - 1.18 mg/dL    Calcium Level Total 8.5 (L) 8.8 - 10.0 mg/dL    Albumin Level 2.2 (L) 3.4 - 4.8 g/dL    Phosphorus Level 3.6 2.3 - 4.7 mg/dL    eGFR 22 mls/min/1.73/m2   Hemoglobin and Hematocrit    Collection Time: 01/02/24 10:34 PM   Result Value Ref Range    Hgb 11.5 (L) 14.0 - 18.0 g/dL    Hct 37.2 (L) 42.0 - 52.0 %   Phosphorus    Collection Time: 01/03/24  2:18 AM   Result Value Ref Range    Phosphorus Level 3.8 2.3 - 4.7 mg/dL   Magnesium    Collection Time: 01/03/24  2:18 AM   Result Value Ref Range    Magnesium Level 2.00 1.60 - 2.60 mg/dL   Comprehensive Metabolic Panel    Collection Time: 01/03/24  2:18 AM   Result Value Ref Range    Sodium Level 141 132 - 146 mmol/L    Potassium Level 5.1 3.5 - 5.1 mmol/L    Chloride 112 (H) 98 - 111 mmol/L    Carbon Dioxide 16 (L) 23 - 31 mmol/L    Glucose Level 106 75 - 121 mg/dL    Blood Urea Nitrogen 80.2 (H) 8.4 - 25.7 mg/dL    Creatinine 2.59 (H) 0.73 - 1.18 mg/dL    Calcium Level Total 8.7 (L) 8.8 - 10.0 mg/dL    Protein Total 6.1 5.8 - 7.6 gm/dL    Albumin Level 2.3 (L) 3.4 - 4.8 g/dL    Globulin 3.8 (H) 2.4 - 3.5 gm/dL    Albumin/Globulin Ratio 0.6 (L) 1.1 - 2.0 ratio    Bilirubin Total 0.4 <=1.5 mg/dL    Alkaline Phosphatase 47 40 - 150 unit/L    Alanine Aminotransferase 21 0 - 55 unit/L    Aspartate Aminotransferase 47 (H) 5 - 34 unit/L    eGFR 22 mls/min/1.73/m2   CBC with Differential    Collection Time: 01/03/24  2:18 AM   Result Value Ref Range    WBC 8.60 4.50 - 11.50 x10(3)/mcL    RBC 4.51 (L) 4.70 - 6.10 x10(6)/mcL    Hgb 12.2 (L) 14.0 - 18.0 g/dL     Hct 38.0 (L) 42.0 - 52.0 %    MCV 84.3 80.0 - 94.0 fL    MCH 27.1 27.0 - 31.0 pg    MCHC 32.1 (L) 33.0 - 36.0 g/dL    RDW 15.9 11.5 - 17.0 %    Platelet 191 130 - 400 x10(3)/mcL    MPV 11.5 (H) 7.4 - 10.4 fL    NRBC% 0.0 %   Manual Differential    Collection Time: 01/03/24  2:18 AM   Result Value Ref Range    WBC 8.6 x10(3)/mcL    Neutrophils % 82 %    Lymphs % 9 %    Metamyelocytes % 9 (H) <=0 %    Neutrophils Abs 7.052 2.1 - 9.2 x10(3)/mcL    Lymphs Abs 0.774 0.6 - 4.6 x10(3)/mcL    Platelets Normal Normal, Adequate    RBC Morph Normal Normal   RT Blood Gas    Collection Time: 01/03/24  4:52 AM   Result Value Ref Range    Sample Type Arterial Blood     Sample site Right Radial Artery     Drawn by ivan rt     pH, Blood gas 7.410 7.350 - 7.450    pCO2, Blood gas 30.0 (L) 35.0 - 45.0 mmHg    pO2, Blood gas 56.0 (L) 80.0 - 100.0 mmHg    Sodium, Blood Gas 135 (L) 137 - 145 mmol/L    Potassium, Blood Gas 4.7 3.5 - 5.0 mmol/L    Calcium Level Ionized 1.13 1.12 - 1.23 mmol/L    TOC2, Blood gas 19.9 mmol/L    Base Excess, Blood gas -4.60 mmol/L    sO2, Blood gas 89.0 %    HCO3, Blood gas 19.0 (L) 22.0 - 26.0 mmol/L    Allens Test Yes     Oxygen Device, Blood gas Oxy Mask     LPM 15    RT Blood Gas    Collection Time: 01/03/24  7:35 AM   Result Value Ref Range    Sample Type Arterial Blood     Sample site Left Brachial Artery     Drawn by sd rrt     pH, Blood gas 7.400 7.350 - 7.450    pCO2, Blood gas 30.0 (L) 35.0 - 45.0 mmHg    pO2, Blood gas 60.0 (L) 80.0 - 100.0 mmHg    Sodium, Blood Gas 134 (L) 137 - 145 mmol/L    Potassium, Blood Gas 4.6 3.5 - 5.0 mmol/L    Calcium Level Ionized 1.15 1.12 - 1.23 mmol/L    TOC2, Blood gas 19.5 mmol/L    Base Excess, Blood gas -5.10 mmol/L    sO2, Blood gas 91.0 %    HCO3, Blood gas 18.6 (L) 22.0 - 26.0 mmol/L    Allens Test N/A     MODE CPAP     FIO2, Blood gas 55 %    CPAP 10 cm H2O     Telemetry:  AF RVR    Physical Exam  Vitals and nursing note reviewed.   Constitutional:        General: He is not in acute distress.     Appearance: He is ill-appearing.   HENT:      Head: Normocephalic.   Cardiovascular:      Rate and Rhythm: Tachycardia present. Rhythm irregular.   Pulmonary:      Effort: Pulmonary effort is normal. No respiratory distress.      Breath sounds: Rales present.      Comments: Right Posterior Breath Sounds are Diminished.   Musculoskeletal:      Cervical back: Neck supple.      Right lower leg: No edema.      Left lower leg: No edema.      Comments: Legs are Warm   Skin:     General: Skin is warm and dry.   Neurological:      Mental Status: He is alert. He is confused.   Psychiatric:      Comments: Toya         Current Inpatient Medications:    Current Facility-Administered Medications:     0.9%  NaCl infusion, , Intravenous, Continuous, Bernardino Ordoñez MD, Last Rate: 5 mL/hr at 01/03/24 1022, Rate Verify at 01/03/24 1022    acetaminophen tablet 1,000 mg, 1,000 mg, Oral, Q6H PRN, Grisel Valentine, FNP, 1,000 mg at 01/02/24 1630    acetaminophen tablet 650 mg, 650 mg, Oral, Q4H PRN, Grisel Valentine, FNP    albuterol nebulizer solution, 10 mg/hr, Nebulization, Continuous, Mendoza Mckeon MD, Last Rate: 12 mL/hr at 01/02/24 1256, 10 mg/hr at 01/02/24 1256    albuterol-ipratropium 2.5 mg-0.5 mg/3 mL nebulizer solution 3 mL, 3 mL, Nebulization, Q4H PRN, Grisel Valentine, FNP, 3 mL at 01/03/24 0229    amiodarone 360 mg/200 mL (1.8 mg/mL) infusion, 1 mg/min, Intravenous, Continuous, Manny Sawant DO, Last Rate: 33.3 mL/hr at 01/03/24 1022, 1 mg/min at 01/03/24 1022    amiodarone 360 mg/200 mL (1.8 mg/mL) infusion, 0.5 mg/min, Intravenous, Continuous, Manny Sawant DO    atorvastatin tablet 10 mg, 10 mg, Oral, Daily, Grisel Valentine FNP    calcitRIOL capsule 0.25 mcg, 0.25 mcg, Oral, Daily, Grisel Valentine, NADIR    ceFEPIme (MAXIPIME) 500 mg in dextrose 5 % (D5W) 100 mL IVPB, 500 mg, Intravenous, Q24H, Manny Sawant DO,  Stopped at 01/02/24 2337    gabapentin capsule 300 mg, 300 mg, Oral, QHS, Grisel Valentine, NADIR    melatonin tablet 6 mg, 6 mg, Oral, Nightly PRN, Grisel Valentine FNP    morphine 4 mg/mL injection, , , ,     mupirocin 2 % ointment, , Nasal, BID, Bernardino Ordoñez MD, Given at 01/03/24 0910    naloxone 0.4 mg/mL injection 0.02 mg, 0.02 mg, Intravenous, PRN, Grisel Valentine FNP    NORepinephrine 8 mg in dextrose 5% 250 mL infusion, 0-3 mcg/kg/min, Intravenous, Continuous, Fadumo Ramirez, , Last Rate: 17.3 mL/hr at 01/03/24 1022, 0.12 mcg/kg/min at 01/03/24 1022    ondansetron injection 4 mg, 4 mg, Intravenous, Q4H PRN, Grisel Valentine FNP    prochlorperazine injection Soln 5 mg, 5 mg, Intravenous, Q6H PRN, Grisel Valentine, NADIR    simethicone chewable tablet 80 mg, 1 tablet, Oral, QID PRN, Grisel Valentine FNP    sodium chloride 0.9% flush 10 mL, 10 mL, Intravenous, PRN, Grisel Valentine, FNP    sodium chloride 0.9% flush 10 mL, 10 mL, Intravenous, PRN, Manny Sawant DO    Pharmacy to dose Vancomycin consult, , , Once **AND** vancomycin - pharmacy to dose, , Intravenous, pharmacy to manage frequency, Manny Sawant DO    VTE Risk Mitigation (From admission, onward)           Ordered     Reason for No Pharmacological VTE Prophylaxis  Once        Question:  Reasons:  Answer:  Already adequately anticoagulated on oral Anticoagulants    01/02/24 1329     IP VTE HIGH RISK PATIENT  Once         01/02/24 1329     Place sequential compression device  Until discontinued         01/02/24 1329                  Assessment:   Acute on Chronic Diastolic Heart Failure    - EF 60-65%  Atrial Fibrillation (Unspecified)- Now with RVR    - [id number]: 4    - On Eliquis Outpatient- Will start Heparin Infusion for Stroke Risk Reduction  Bilateral Pleural Effusion (Right > Left)  NSTEMI- Type II in the Setting of Heart Failure & Septic Shock  Septic Shock Requiring  Vasopressor Support    - Strept Bacteremia   Valvular Heart Disease    - AS: Mild to Moderate, TR: Mild to Moderate, MS: Mild with Severe MAC  COPD  Acute Kidney Failure  Lactic Acidosis  Confusion    - Requiring Mittens     - CT Head Normal  Hyperlipidemia  History of Esophageal Varices  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 Resume when BP Permits.  If need for Thoracentesis is ruled out, will transition back to Eliquis.    NADIR Perez  Cardiology  [facility] - [facility] ICU  01/03/2024     Physician addendum:  I have seen and examined this patient as a split-shared visit with the DAYANA d/t complicated medical management of above problems written in assessment and high acuity requiring physician expertise in medical decision-making. I performed the substantive portion of the history and exam. Above medical decision-making is also formulated by me.    Cardiovascular exam:  S1, S2  Lungs:  Bilateral decreased breath sounds.  Extremities:  1+ edema bilaterally    Plan:  Patient may need pleural tap as per ICU team.  Start heparin.  Patient will need oral anticoagulant as outpatient.  Nephrology doing diuretic management.  We will follow up.      Mukund Melo MD  Cardiologist

## 2024-01-03 NOTE — PROCEDURES
Arterial Catheter Insertion Procedure Note     Procedure: Insertion of Arterial Catheter     Indications: Hemodynamic Monitoring, septic shock    Procedure Details     Maximum sterile technique was used including antiseptics, cap, sterile gloves, sterile gown, hand hygiene, mask and sterile maximum barrier drape.     Under sterile conditions the skin above the left  artery was prepped with Chloroprep and covered with a sterile drape. Local anesthesia was applied to the skin and subcutaneous tissues. Ultrasound guidance was used to identify the artery. A 21 -gauge catheter over a needle was then inserted into the artery. A guide wire was then passed easily through the needle. The needle was then withdrawn. However the catheter was unsuccessful in being placed into the artery x3 attempts. Arterial line placement was aborted. Will retry placement later should patient continue to require levophed for hemodynamic support     Ultrasound/Sonosite was used during the procedure.      Estimated blood loss: 1 ml    Patient tolerated procedure well.    Manny Sawant, DO  1/2/2024

## 2024-01-03 NOTE — CARE UPDATE
Notified a rapid response was in place while passing patient's room in ER. Patient hypotensive.  Assessed at bedside, son present.  Awake alert mildly agitated, tachypnic no accessory muscle use.  Bilateral rhochi noted.  Abd soft. Moving all ext spontaneously without gross FND.     Septic shock- CAP and UTI (uretal stents) sbp in 70s despite 3 L, start levophed. Urology consult for uti in setting of uretal stents. HFpEF 60% on TTE tonight noted. ABX ordered. UCX Get mrsa pcr.   ARF- should improve with mgmt as above. Mo  NSTEMI- no WMA on echo, trop flat should improve with mgmt as above    Case discussed with ICU resident  Crit care time 35 min, crit care dx: septic shock    Ross Bazzi MD

## 2024-01-03 NOTE — PROCEDURES
"Francis Montero is a 89 y.o. male patient.    Temp: 98.7 °F (37.1 °C) (01/03/24 1600)  Pulse: 97 (01/03/24 1630)  Resp: (!) 48 (01/03/24 1630)  BP: 121/61 (01/03/24 1630)  SpO2: (!) 94 % (01/03/24 1630)  Weight: 72.5 kg (159 lb 13.3 oz) (01/02/24 2220)  Height: 5' 5" (165.1 cm) (01/03/24 1221)       Arterial Line    Date/Time: 1/3/2024 5:18 PM  Location procedure was performed: [facility]    Performed by: Bernardino Ordoñez MD  Authorized by: Bernardino Ordoñze MD  Pre-op Diagnosis: septic shock  Post-operative diagnosis: same  Consent Done: Not Needed  Preparation: Patient was prepped and draped in the usual sterile fashion.  Indications: multiple ABGs, respiratory failure and hemodynamic monitoring  Location: right radial  Anesthesia: local infiltration    Anesthesia:  Local Anesthetic: lidocaine 1% without epinephrine  Anesthetic total: 5 mL    Patient sedated: no  Darrius's test normal: yes  Needle gauge: 22  Seldinger technique: Seldinger technique used  Number of attempts: 1  Complications: No  Specimens: No  Implants: No  Post-procedure: line sutured and dressing applied  Post-procedure CMS: normal  Patient tolerance: Patient tolerated the procedure well with no immediate complications  Comments: Procedure performed under ultrasound guidance          1/3/2024    "

## 2024-01-03 NOTE — PLAN OF CARE
01/03/24 1510   Discharge Assessment   Assessment Type Discharge Planning Assessment   Confirmed/corrected address, phone number and insurance Yes   Confirmed Demographics Correct on Facesheet  (pt's physical address: [facility] [place] [place] [address])   Source of Information family  (pt's wife, Osmany)   Communicated NADIRA with patient/caregiver Date not available/Unable to determine   Reason For Admission SOB, hx COPD   People in Home spouse  (Pt lives with his wife, Osmany in a single story home with 4 steps to enter and no rails along the steps)   Do you expect to return to your current living situation? Yes   Do you have help at home or someone to help you manage your care at home? Yes   Who are your caregiver(s) and their phone number(s)? pt's wife, Osmany will be assist pt upon dc   Prior to hospitilization cognitive status: Alert/Oriented   Current cognitive status: Unable to Assess  (pt on cpap)   Walking or Climbing Stairs Difficulty no   Dressing/Bathing Difficulty no   Home Layout Able to live on 1st floor   Equipment Currently Used at Home nebulizer   Readmission within 30 days? No   Patient currently being followed by outpatient case management? No   Do you currently have service(s) that help you manage your care at home? No   Who is going to help you get home at discharge? family   How do you get to doctors appointments? car, drives self   Are you on dialysis? No   Discharge Plan A Home with family   Discharge Plan B Home Health   DME Needed Upon Discharge  other (see comments)  (TBD)   Discharge Plan discussed with: Adult children;Spouse/sig other   Name(s) and Number(s) wife, Osmany and pt's son, Don   Transition of Care Barriers None   Housing Stability   In the last 12 months, was there a time when you were not able to pay the mortgage or rent on time? N   Transportation Needs   In the past 12 months, has lack of transportation kept you from medical appointments or from  getting medications? no   Food Insecurity   Within the past 12 months, you worried that your food would run out before you got the money to buy more. Never true   OTHER   Name(s) of People in Home wife, Osmany     Pt's PCP is Dr Lula Terry. Pt's  is his wife, Osmany ([phone]). Pt had HH services before in [place]. Pt uses [facility] pharmacy in [place]. Pt does drive and is retired.  Pt's family would like to transfer to [facility], closer to home. I explained to the son that insurance will not pay for transport to [place] since it will not be a higher level of care. Pt's son voiced understanding. CM to follow

## 2024-01-03 NOTE — PROGRESS NOTES
Inpatient Nutrition Assessment    Admit Date: 1/2/2024   Total duration of encounter: 1 day   Patient Age: 89 y.o.    Nutrition Recommendation/Prescription     Advance diet when appropriate. Goal diet: regular vs. Cardiac if po intake >75% of meals.  Add Boost Very High Calorie (provides 530 kcal, 22 g protein per serving) TID.  May need to consider appetite stimulant if po intake <50% of meals once diet restarted.    Communication of Recommendations: reviewed with nurse and reviewed with patient    Nutrition Assessment     Malnutrition Assessment/Nutrition-Focused Physical Exam    Malnutrition Context: acute illness or injury (01/03/24 1223)  Malnutrition Level:  (does not meet criteria) (01/03/24 1223)  Energy Intake (Malnutrition):  (unable to eval) (01/03/24 1223)  Weight Loss (Malnutrition):  (unable to eval) (01/03/24 1223)  Subcutaneous Fat (Malnutrition):  (does not meet criteria) (01/03/24 1223)           Muscle Mass (Malnutrition): mild depletion (01/03/24 1223)  Jew Region (Muscle Loss): mild depletion  Clavicle Bone Region (Muscle Loss): mild depletion                    Fluid Accumulation (Malnutrition):  (does not meet criteria) (01/03/24 1223)        A minimum of two characteristics is recommended for diagnosis of either severe or non-severe malnutrition.    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, COPD, prostate cancer (s/p ureteral stent placement), paroxysmal AFib,  CKD, HTN, HLD, prostate cancer, and Merkel cell carcinoma     Scheduled Medications:  atorvastatin, 10 mg, Daily  calcitRIOL, 0.25 mcg, Daily  ceFEPime (MAXIPIME) IVPB, 500 mg, Q24H  gabapentin, 300 mg, QHS  morphine, ,   mupirocin, , BID    Continuous Infusions:  sodium chloride 0.9%, Last Rate: 5 mL/hr at 01/03/24 1036  albuterol 0.083%, Last Rate: 10 mg/hr (01/02/24 1256)  amiodarone in dextrose 5%, Last Rate: 0.5 mg/min (01/03/24 1145)  heparin (porcine) in D5W  NORepinephrine bitartrate-D5W, Last Rate: 0.14 mcg/kg/min (01/03/24 1142)    PRN Medications: acetaminophen, acetaminophen, albuterol-ipratropium, heparin (PORCINE), heparin (PORCINE), melatonin, morphine, naloxone, ondansetron, prochlorperazine, simethicone, sodium chloride 0.9%, sodium chloride 0.9%, Pharmacy to dose Vancomycin consult **AND** vancomycin - pharmacy to dose    Calorie Containing IV Medications: no significant kcals from medications at this time    Recent Labs   Lab 01/02/24  1042 01/02/24  1635 01/02/24  1637 01/02/24  2234 01/03/24  0218    141  --  143 141   K 5.7* 5.5*  --  4.9 5.1   CALCIUM 8.4* 8.1*  --  8.5* 8.7*   PHOS  --  3.4  --  3.6 3.8   MG 1.50* 2.00  --   --  2.00   CHLORIDE 113* 114*  --  115* 112*   CO2 16* 12*  --  14* 16*   BUN 83.4* 80.4*  --  80.9* 80.2*   CREATININE 3.10* 2.83*  --  2.65* 2.59*   EGFRNORACEVR 18 20  --  22 22   GLUCOSE 84 113  --  73* 106   BILITOT 0.7 0.4  --   --  0.4   ALKPHOS 36* 34*  --   --  47   ALT 21 20  --   --  21   AST 33 37*  --   --  47*   ALBUMIN 2.5* 2.2*  --  2.2* 2.3*   WBC 13.89  13.89*  --  7.77  7.77  --  8.6  8.60   HGB 11.4*  --  11.0* 11.5* 12.2*   HCT 37.4*  --  36.0* 37.2* 38.0*     Nutrition Orders:  Diet NPO  Dietary nutrition supplements Boost VHC Vanilla; TID    Appetite/Oral Intake: NPO/NPO  Factors Affecting Nutritional Intake: NPO  Food/Cheondoism/Cultural Preferences: none reported  Food Allergies: none reported     Wound(s):  none  "noted    Comments    1/3/24: Pt unable to verify much subjective info at this time, unsure of UBW. Agreeable to ONS once able to have po intake of meals. Noted pt now NPO. Noted current GFR, renal consulted. Will monitor for need for change to est needs/recommendations.     Anthropometrics    Height: 5' 5" (165.1 cm), Height Method: Stated  Last Weight: 72.5 kg (159 lb 13.3 oz) (01/02/24 2220), Weight Method: Bed Scale  BMI (Calculated): 26.6  BMI Classification: overweight (BMI 25-29.9)        Ideal Body Weight (IBW), Male: 136 lb     % Ideal Body Weight, Male (lb): 117.53 %                          Usual Weight Provided By: unable to obtain usual weight    Wt Readings from Last 5 Encounters:   01/02/24 72.5 kg (159 lb 13.3 oz)     Weight Change(s) Since Admission:   Wt Readings from Last 1 Encounters:   01/02/24 2220 72.5 kg (159 lb 13.3 oz)   01/02/24 0930 77.1 kg (170 lb)   Admit Weight: 77.1 kg (170 lb) (01/02/24 0930), Weight Method: Estimated    Estimated Needs    Weight Used For Calorie Calculations: 72.5 kg (159 lb 13.3 oz)  Energy Calorie Requirements (kcal): 1680kcal (1.3 stress factor)  Energy Need Method: [place]-St Jeor  Weight Used For Protein Calculations: 72.5 kg (159 lb 13.3 oz)  Protein Requirements: 58-73gm (0.8-1g/kg) - may need to be updated pending renal function  Fluid Requirements (mL): per MD    Enteral Nutrition     Patient not receiving enteral nutrition at this time.    Parenteral Nutrition     Patient not receiving parenteral nutrition support at this time.    Evaluation of Received Nutrient Intake    Calories: not meeting estimated needs  Protein: not meeting estimated needs    Patient Education     Not applicable.    Nutrition Diagnosis     PES: Inadequate oral intake related to acute illness as evidenced by NPO since 1/3/24. (new)     Nutrition Interventions     Intervention(s): general/healthful diet, modified composition of enteral nutrition, modified rate of enteral nutrition, " commercial beverage, and collaboration with other providers    Goal: Meet greater than 80% of nutritional needs by follow-up. (new)  Goal: Consume % of meals/snacks by follow-up. (new)    Nutrition Goals & Monitoring     Dietitian will monitor: energy intake    Nutrition Risk/Follow-Up: moderate (follow-up in 3-5 days)   Please consult if re-assessment needed sooner.

## 2024-01-03 NOTE — CONSULTS
Francis Montero 3/1/1929  [id number]  1/3/2024    CONSULTING PHYSICIAN: Dr. Ross Bazzi    REASON FOR CONSULTATION: urosepsis, ureteral stent    HPI:  The patient is a 89-year-old male with a past medical history of pulmonary fibrosis, COPD, atrial fibrillation, CKD, hypertension, Merkel cell carcinoma, prostate cancer and ureteral stent.  He was admitted on 01/01/2004 for UTI with sepsis and suspected pneumonia.  He was transferred to the ICU yesterday due to hypotension despite fluid resuscitation requiring initiation of vasopressors. The patient reports dysuria, fatigue, myalgia and SOB. He denies any fever or chills.  Lab work this morning reveals WBC 8.6, H&H 12.2/38.0, BUN and creatinine 80.2/2.59 (creatinine 310 on admit); UA with turbid yellow urine, negative nitrites, 500 leukocytes, 50-99 RBC, 51 and 99 WBC, many bacteria.  Urine culture with Gram-negative rods, blood culture x2 with Gram-positive cocci probable strep.  He is on cefepime and vancomycin.  Retroperitoneal ultrasound from 01/02/2024 without hydronephrosis, bladder not visualized.    Patient resting in bed. On CPAP, remains on pressors. Denies any pain at time of rounds. Unable to recall details regarding stent. Discussed with his wife and son, Yaron, over the phone. Patient has a history of prostate cancer s/p prostatectomy and radiation about 20 years ago. Reports chronic ureteral stent since radiation that is exchanged about every 3 months. His current stent was placed about 2 months ago. He follows with Dr. Lantigua in [facility].     Past Medical History:   Diagnosis Date    CKD (chronic kidney disease)     COPD (chronic obstructive pulmonary disease)     HLD (hyperlipidemia)     HTN (hypertension)     Merkel cell carcinoma     Paroxysmal A-fib     Prostate cancer     PUD (peptic ulcer disease)      Past Surgical History:   Procedure Laterality Date    INGUINAL HERNIA REPAIR      PROSTATE SURGERY      ROTATOR CUFF REPAIR       No family  history on file.     Current Facility-Administered Medications   Medication Dose Route Frequency Provider Last Rate Last Admin    0.9%  NaCl infusion   Intravenous Continuous Bernardino Ordoñez MD 5 mL/hr at 01/03/24 0545 Rate Verify at 01/03/24 0545    acetaminophen tablet 1,000 mg  1,000 mg Oral Q6H PRN Grisel Valentine FNP   1,000 mg at 01/02/24 1630    acetaminophen tablet 650 mg  650 mg Oral Q4H PRN Grisel Valentine FNP        albuterol nebulizer solution  10 mg/hr Nebulization Continuous Mendoza Mckeon MD 12 mL/hr at 01/02/24 1256 10 mg/hr at 01/02/24 1256    albuterol-ipratropium 2.5 mg-0.5 mg/3 mL nebulizer solution 3 mL  3 mL Nebulization Q4H PRN Grisel Valentine FNP   3 mL at 01/03/24 0229    amiodarone 360 mg/200 mL (1.8 mg/mL) infusion  1 mg/min Intravenous Continuous Manny Sawant DO 33.3 mL/hr at 01/03/24 0549 1 mg/min at 01/03/24 0549    amiodarone 360 mg/200 mL (1.8 mg/mL) infusion  0.5 mg/min Intravenous Continuous Manny Sawant DO        atorvastatin tablet 10 mg  10 mg Oral Daily Grisel Valentine FNP        calcitRIOL capsule 0.25 mcg  0.25 mcg Oral Daily Grisel Valentine FNP        ceFEPIme (MAXIPIME) 500 mg in dextrose 5 % (D5W) 100 mL IVPB  500 mg Intravenous Q24H Manny Sawant DO   Stopped at 01/02/24 2337    gabapentin capsule 300 mg  300 mg Oral QHS Grisel Valentine FNP        heparin (porcine) injection 5,000 Units  5,000 Units Subcutaneous Q8H Grisel Valentine FNP   5,000 Units at 01/03/24 0537    melatonin tablet 6 mg  6 mg Oral Nightly PRN Grisel Valentine FNP        metoprolol tartrate (LOPRESSOR) tablet 25 mg  25 mg Oral BID Grisel Valentine FNP        morphine 4 mg/mL injection             mupirocin 2 % ointment   Nasal BID Bernardino Ordoñez MD   Given at 01/02/24 2307    naloxone 0.4 mg/mL injection 0.02 mg  0.02 mg Intravenous PRN Grisel Valentine FNP        NORepinephrine 8 mg (LEVOPHED)  8 mg/250 mL (32 mcg/mL) in dextrose 5% 250 mL infusion             NORepinephrine 8 mg in dextrose 5% 250 mL infusion  0-3 mcg/kg/min Intravenous Continuous Fadumo Ramirez DO 13 mL/hr at 01/03/24 0545 0.09 mcg/kg/min at 01/03/24 0545    ondansetron injection 4 mg  4 mg Intravenous Q4H PRN Grisel Valentine FNP        prochlorperazine injection Soln 5 mg  5 mg Intravenous Q6H PRN Grisel Valentine, NADIR        simethicone chewable tablet 80 mg  1 tablet Oral QID PRN Grisel Valentine, DELP        sodium chloride 0.9% flush 10 mL  10 mL Intravenous PRN Grisel Valentine, FNP        sodium chloride 0.9% flush 10 mL  10 mL Intravenous PRN Manny Sawant DO        vancomycin - pharmacy to dose   Intravenous pharmacy to manage frequency Manny Sawant DO         Review of patient's allergies indicates:  No Known Allergies    ROS: 12 point review of systems negative other than the HPI    PHYSICAL EXAM:  Vitals:    01/03/24 0645 01/03/24 0700 01/03/24 0715 01/03/24 0730   BP: 104/62 98/69 113/77 125/79   Pulse: (!) 114 91 101 109   Resp: (!) 26 (!) 28 19 (!) 28   Temp:       TempSrc:       SpO2: (!) 91% (!) 91% (!) 93% (!) 92%   Weight:       Height:           Intake/Output Summary (Last 24 hours) at 1/3/2024 0842  Last data filed at 1/3/2024 0545  Gross per 24 hour   Intake 2440.12 ml   Output 2380 ml   Net 60.12 ml       GEN: WN/WD NAD  HEENT: normocephalic, atraumatic, PERRLA, EOMI, OP clear, nares patent  RESP:  Unlabored, on CPAP  ABD: soft, NTND  : clear yellow urine draining to  bag  EXT: no C/C/E  NEURO: Awake and alert, some confusion    LABS:  Recent Results (from the past 24 hour(s))   Blood culture #1 **CANNOT BE ORDERED STAT**    Collection Time: 01/02/24 11:56 AM    Specimen: Blood   Result Value Ref Range    CULTURE, BLOOD ([facility]) Identification and Susceptibility To Follow     CULTURE, BLOOD ([facility]) Gram-positive coccus probable strep (A)     GRAM STAIN Gram Positive  Cocci, probable Streptococcus (AA)     GRAM STAIN Seen in gram stain of broth only (AA)     GRAM STAIN 1 of 1 Pediatric bottle positive (AA)    Blood culture #2 **CANNOT BE ORDERED STAT**    Collection Time: 01/02/24 11:56 AM    Specimen: Blood   Result Value Ref Range    GRAM STAIN Gram Positive Cocci, probable Streptococcus (AA)     GRAM STAIN Seen in gram stain of broth only (AA)     GRAM STAIN 1 of 1 Pediatric bottle positive (AA)     GRAM STAIN 2 of 2 bottles positive (AA)    Lactic acid, plasma    Collection Time: 01/02/24 11:56 AM   Result Value Ref Range    Lactic Acid Level 3.3 (H) 0.5 - 2.2 mmol/L   Urinalysis, Reflex to Urine Culture    Collection Time: 01/02/24  1:05 PM    Specimen: Urine   Result Value Ref Range    Color, UA Yellow Yellow, Light-Yellow, Colorless, Straw, Dark-Yellow    Appearance, UA Turbid (A) Clear    Specific Gravity, UA 1.017 1.005 - 1.030    pH, UA 5.0 5.0 - 8.5    Protein, UA 1+ (A) Negative    Glucose, UA Normal Negative, Normal    Ketones, UA Negative Negative    Blood, UA 3+ (A) Negative    Bilirubin, UA Negative Negative    Urobilinogen, UA Normal 0.2, 1.0, Normal    Nitrites, UA Negative Negative    Leukocyte Esterase,  (A) Negative    WBC, UA 51-99 (A) None Seen, 0-2, 3-5, 0-5 /HPF    WBC Clumps, UA Few (A) None Seen, 0-5    Bacteria, UA Many (A) None Seen, Trace /HPF    Squamous Epithelial Cells, UA Trace None Seen /HPF    Mucous, UA Trace (A) None Seen /LPF    RBC, UA 50-99 (A) None Seen, 0-2, 3-5, 0-5 /HPF   Urine culture    Collection Time: 01/02/24  1:05 PM    Specimen: Urine   Result Value Ref Range    Urine Culture >/= 100,000 colonies/ml Gram-negative Rods (A)    Lactic Acid, Plasma    Collection Time: 01/02/24  1:58 PM   Result Value Ref Range    Lactic Acid Level 4.1 (HH) 0.5 - 2.2 mmol/L   Magnesium    Collection Time: 01/03/24  2:18 AM   Result Value Ref Range    Magnesium Level 2.00 1.60 - 2.60 mg/dL   Comprehensive Metabolic Panel    Collection Time:  01/03/24  2:18 AM   Result Value Ref Range    Sodium Level 141 132 - 146 mmol/L    Potassium Level 5.1 3.5 - 5.1 mmol/L    Chloride 112 (H) 98 - 111 mmol/L    Carbon Dioxide 16 (L) 23 - 31 mmol/L    Glucose Level 106 75 - 121 mg/dL    Blood Urea Nitrogen 80.2 (H) 8.4 - 25.7 mg/dL    Creatinine 2.59 (H) 0.73 - 1.18 mg/dL    Calcium Level Total 8.7 (L) 8.8 - 10.0 mg/dL    Protein Total 6.1 5.8 - 7.6 gm/dL    Albumin Level 2.3 (L) 3.4 - 4.8 g/dL    Globulin 3.8 (H) 2.4 - 3.5 gm/dL    Albumin/Globulin Ratio 0.6 (L) 1.1 - 2.0 ratio    Bilirubin Total 0.4 <=1.5 mg/dL    Alkaline Phosphatase 47 40 - 150 unit/L    Alanine Aminotransferase 21 0 - 55 unit/L    Aspartate Aminotransferase 47 (H) 5 - 34 unit/L    eGFR 22 mls/min/1.73/m2   CBC with Differential    Collection Time: 01/03/24  2:18 AM   Result Value Ref Range    WBC 8.60 4.50 - 11.50 x10(3)/mcL    RBC 4.51 (L) 4.70 - 6.10 x10(6)/mcL    Hgb 12.2 (L) 14.0 - 18.0 g/dL    Hct 38.0 (L) 42.0 - 52.0 %    MCV 84.3 80.0 - 94.0 fL    MCH 27.1 27.0 - 31.0 pg    MCHC 32.1 (L) 33.0 - 36.0 g/dL    RDW 15.9 11.5 - 17.0 %    Platelet 191 130 - 400 x10(3)/mcL    MPV 11.5 (H) 7.4 - 10.4 fL    NRBC% 0.0 %   Manual Differential    Collection Time: 01/03/24  2:18 AM   Result Value Ref Range    WBC 8.6 x10(3)/mcL    Neutrophils % 82 %    Lymphs % 9 %    Metamyelocytes % 9 (H) <=0 %    Neutrophils Abs 7.052 2.1 - 9.2 x10(3)/mcL    Lymphs Abs 0.774 0.6 - 4.6 x10(3)/mcL    Platelets Normal Normal, Adequate    RBC Morph Normal Normal   RT Blood Gas    Collection Time: 01/03/24  4:52 AM   Result Value Ref Range    Sample Type Arterial Blood     Sample site Right Radial Artery     Drawn by ivan rt     pH, Blood gas 7.410 7.350 - 7.450    pCO2, Blood gas 30.0 (L) 35.0 - 45.0 mmHg    pO2, Blood gas 56.0 (L) 80.0 - 100.0 mmHg    Sodium, Blood Gas 135 (L) 137 - 145 mmol/L    Potassium, Blood Gas 4.7 3.5 - 5.0 mmol/L    Calcium Level Ionized 1.13 1.12 - 1.23 mmol/L    TOC2, Blood gas 19.9 mmol/L     Base Excess, Blood gas -4.60 mmol/L    sO2, Blood gas 89.0 %    HCO3, Blood gas 19.0 (L) 22.0 - 26.0 mmol/L    Allens Test Yes     Oxygen Device, Blood gas Oxy Mask     LPM 15        IMAGING:  US Retroperitoneal Complete [[id number]] Resulted: 01/02/24 1546   Order Status: Completed Updated: 01/02/24 1549   Narrative:     EXAMINATION:  US RETROPERITONEAL COMPLETE    CLINICAL HISTORY:  elevated BUN/Creat;    COMPARISON:  None.    FINDINGS:  Grayscale and color Doppler sonographic evaluation of the kidneys and urinary bladder.    The right kidney measures 8 cm. The left kidney measures 8 cm.   No hydronephrosis.  Heterogeneous renal parenchymal echogenicity with loss of corticomedullary differentiation.    Bladder is not visualized.   Impression:       1. No hydronephrosis.  2. Medical renal disease.      Electronically signed by: Soren Miranda  Date: 01/02/2024  Time: 15:46         ASSESSMENT:  -UTI with ureteral stent  -bacteremia, sepsis  -acute on chronic respiratory failure  -STEPHEN on CKD  -h/o prostate cancer    PLAN:  -Will obtain CT abdomen/pelvis for further evaluation  -Continue medical treatment of sepsis and monitor his progress  -Further to follow once imaging completed and reviewed.   -Discussed with nursing and patient's family      Arianna Ornelas NP

## 2024-01-04 LAB
ABS NEUT (OLG): 9.71 X10(3)/MCL (ref 2.1–9.2)
ALBUMIN SERPL-MCNC: 2 G/DL (ref 3.4–4.8)
ALBUMIN/GLOB SERPL: 0.6 RATIO (ref 1.1–2)
ALP SERPL-CCNC: 70 UNIT/L (ref 40–150)
ALT SERPL-CCNC: 18 UNIT/L (ref 0–55)
APTT PPP: 79.3 SECONDS (ref 23.2–33.7)
AST SERPL-CCNC: 39 UNIT/L (ref 5–34)
BACTERIA UR CULT: ABNORMAL
BILIRUB SERPL-MCNC: 0.4 MG/DL
BUN SERPL-MCNC: 81.6 MG/DL (ref 8.4–25.7)
CALCIUM SERPL-MCNC: 8.5 MG/DL (ref 8.8–10)
CHLORIDE SERPL-SCNC: 109 MMOL/L (ref 98–111)
CO2 SERPL-SCNC: 17 MMOL/L (ref 23–31)
CREAT SERPL-MCNC: 2.66 MG/DL (ref 0.73–1.18)
ERYTHROCYTE [DISTWIDTH] IN BLOOD BY AUTOMATED COUNT: 15.9 % (ref 11.5–17)
GFR SERPLBLD CREATININE-BSD FMLA CKD-EPI: 22 MLS/MIN/1.73/M2
GLOBULIN SER-MCNC: 3.1 GM/DL (ref 2.4–3.5)
GLUCOSE SERPL-MCNC: 159 MG/DL (ref 75–121)
HCT VFR BLD AUTO: 35.1 % (ref 42–52)
HGB BLD-MCNC: 11.5 G/DL (ref 14–18)
INSTRUMENT WBC (OLG): 10.33 X10(3)/MCL
LYMPHOCYTES NFR BLD MANUAL: 0.41 X10(3)/MCL
LYMPHOCYTES NFR BLD MANUAL: 4 %
MCH RBC QN AUTO: 26.9 PG (ref 27–31)
MCHC RBC AUTO-ENTMCNC: 32.8 G/DL (ref 33–36)
MCV RBC AUTO: 82.2 FL (ref 80–94)
METAMYELOCYTES NFR BLD MANUAL: 2 %
NEUTROPHILS NFR BLD MANUAL: 94 %
NRBC BLD AUTO-RTO: 0 %
PLATELET # BLD AUTO: 165 X10(3)/MCL (ref 130–400)
PLATELET # BLD EST: ADEQUATE 10*3/UL
PMV BLD AUTO: 12.3 FL (ref 7.4–10.4)
POTASSIUM SERPL-SCNC: 4.3 MMOL/L (ref 3.5–5.1)
PROT SERPL-MCNC: 5.1 GM/DL (ref 5.8–7.6)
RBC # BLD AUTO: 4.27 X10(6)/MCL (ref 4.7–6.1)
RBC MORPH BLD: NORMAL
SODIUM SERPL-SCNC: 138 MMOL/L (ref 132–146)
WBC # SPEC AUTO: 10.33 X10(3)/MCL (ref 4.5–11.5)

## 2024-01-04 PROCEDURE — 94660 CPAP INITIATION&MGMT: CPT

## 2024-01-04 PROCEDURE — 99900035 HC TECH TIME PER 15 MIN (STAT)

## 2024-01-04 PROCEDURE — 36569 INSJ PICC 5 YR+ W/O IMAGING: CPT

## 2024-01-04 PROCEDURE — 85027 COMPLETE CBC AUTOMATED: CPT | Performed by: NURSE PRACTITIONER

## 2024-01-04 PROCEDURE — 27100171 HC OXYGEN HIGH FLOW UP TO 24 HOURS

## 2024-01-04 PROCEDURE — 20000000 HC ICU ROOM

## 2024-01-04 PROCEDURE — 94761 N-INVAS EAR/PLS OXIMETRY MLT: CPT

## 2024-01-04 PROCEDURE — 27000207 HC ISOLATION

## 2024-01-04 PROCEDURE — 63600175 PHARM REV CODE 636 W HCPCS: Performed by: INTERNAL MEDICINE

## 2024-01-04 PROCEDURE — 85730 THROMBOPLASTIN TIME PARTIAL: CPT | Performed by: INTERNAL MEDICINE

## 2024-01-04 PROCEDURE — 02HV33Z INSERTION OF INFUSION DEVICE INTO SUPERIOR VENA CAVA, PERCUTANEOUS APPROACH: ICD-10-PCS | Performed by: INTERNAL MEDICINE

## 2024-01-04 PROCEDURE — 25000003 PHARM REV CODE 250: Performed by: STUDENT IN AN ORGANIZED HEALTH CARE EDUCATION/TRAINING PROGRAM

## 2024-01-04 PROCEDURE — 25000003 PHARM REV CODE 250: Performed by: INTERNAL MEDICINE

## 2024-01-04 PROCEDURE — 80053 COMPREHEN METABOLIC PANEL: CPT | Performed by: INTERNAL MEDICINE

## 2024-01-04 PROCEDURE — 63600175 PHARM REV CODE 636 W HCPCS: Performed by: NURSE PRACTITIONER

## 2024-01-04 PROCEDURE — C1751 CATH, INF, PER/CENT/MIDLINE: HCPCS

## 2024-01-04 PROCEDURE — A4216 STERILE WATER/SALINE, 10 ML: HCPCS | Performed by: INTERNAL MEDICINE

## 2024-01-04 PROCEDURE — 63600175 PHARM REV CODE 636 W HCPCS: Performed by: STUDENT IN AN ORGANIZED HEALTH CARE EDUCATION/TRAINING PROGRAM

## 2024-01-04 RX ORDER — SODIUM CHLORIDE 0.9 % (FLUSH) 0.9 %
10 SYRINGE (ML) INJECTION
Status: DISCONTINUED | OUTPATIENT
Start: 2024-01-04 | End: 2024-01-11 | Stop reason: HOSPADM

## 2024-01-04 RX ORDER — SODIUM CHLORIDE 0.9 % (FLUSH) 0.9 %
10 SYRINGE (ML) INJECTION EVERY 6 HOURS
Status: DISCONTINUED | OUTPATIENT
Start: 2024-01-04 | End: 2024-01-11 | Stop reason: HOSPADM

## 2024-01-04 RX ADMIN — MUPIROCIN: 20 OINTMENT TOPICAL at 08:01

## 2024-01-04 RX ADMIN — SODIUM CHLORIDE, PRESERVATIVE FREE 10 ML: 5 INJECTION INTRAVENOUS at 06:01

## 2024-01-04 RX ADMIN — VANCOMYCIN HYDROCHLORIDE 1250 MG: 1.25 INJECTION, POWDER, LYOPHILIZED, FOR SOLUTION INTRAVENOUS at 09:01

## 2024-01-04 RX ADMIN — NOREPINEPHRINE BITARTRATE 0.12 MCG/KG/MIN: 8 INJECTION, SOLUTION INTRAVENOUS at 08:01

## 2024-01-04 RX ADMIN — MEROPENEM 1 G: 1 INJECTION, POWDER, FOR SOLUTION INTRAVENOUS at 12:01

## 2024-01-04 RX ADMIN — AMIODARONE HYDROCHLORIDE 0.5 MG/MIN: 1.8 INJECTION, SOLUTION INTRAVENOUS at 08:01

## 2024-01-04 RX ADMIN — HEPARIN SODIUM 12 UNITS/KG/HR: 10000 INJECTION, SOLUTION INTRAVENOUS at 08:01

## 2024-01-04 RX ADMIN — AMIODARONE HYDROCHLORIDE 0.5 MG/MIN: 1.8 INJECTION, SOLUTION INTRAVENOUS at 06:01

## 2024-01-04 NOTE — PROGRESS NOTES
Pharmacokinetic Assessment Follow Up: IV Vancomycin    Vancomycin serum concentration assessment(s):    The random level was drawn correctly and can be used to guide therapy at this time. The measurement is within the desired definitive target range of 10 to 20 mcg/mL.    Vancomycin Regimen Plan:    Vancomycin 1250 mg q24h.  Check trough at 2100 on 1/5/23.    Drug levels (last 3 results):  Recent Labs   Lab Result Units 01/03/24  1913   Vanc Lvl Random ug/ml 14.6*          Patient brief summary:  Francis Montero is a 89 y.o. male initiated on antimicrobial therapy with IV Vancomycin for treatment of bacteremia      Drug Allergies:   Review of patient's allergies indicates:  No Known Allergies    Actual Body Weight:   72.5kg    Renal Function:   Estimated Creatinine Clearance: 15.2 mL/min (A) (based on SCr of 2.59 mg/dL (H)).,     Dialysis Method (if applicable):  N/A    CBC (last 72 hours):  Recent Labs   Lab Result Units 01/02/24  1042 01/02/24  1637 01/02/24 2234 01/03/24  0218   WBC x10(3)/mcL 13.89  13.89* 7.77  7.77  --  8.6  8.60   Hgb g/dL 11.4* 11.0* 11.5* 12.2*   Hct % 37.4* 36.0* 37.2* 38.0*   Platelet x10(3)/mcL 194 167  --  191   Basophils % % 1  --   --   --        Metabolic Panel (last 72 hours):  Recent Labs   Lab Result Units 01/02/24  1042 01/02/24  1305 01/02/24  1635 01/02/24 2234 01/03/24  0218   Sodium Level mmol/L 142  --  141 143 141   Potassium Level mmol/L 5.7*  --  5.5* 4.9 5.1   Chloride mmol/L 113*  --  114* 115* 112*   Carbon Dioxide mmol/L 16*  --  12* 14* 16*   Glucose Level mg/dL 84  --  113 73* 106   Glucose, UA   --  Normal  --   --   --    Blood Urea Nitrogen mg/dL 83.4*  --  80.4* 80.9* 80.2*   Creatinine mg/dL 3.10*  --  2.83* 2.65* 2.59*   Albumin Level g/dL 2.5*  --  2.2* 2.2* 2.3*   Bilirubin Total mg/dL 0.7  --  0.4  --  0.4   Alkaline Phosphatase unit/L 36*  --  34*  --  47   Aspartate Aminotransferase unit/L 33  --  37*  --  47*   Alanine Aminotransferase unit/L 21  --   20  --  21   Magnesium Level mg/dL 1.50*  --  2.00  --  2.00   Phosphorus Level mg/dL  --   --  3.4 3.6 3.8       Vancomycin Administrations:  vancomycin given in the last 96 hours                     vancomycin 1.5 g in dextrose 5 % 250 mL IVPB (ready to mix) (mg) 1,500 mg New Bag 01/02/24 0607                    Microbiologic Results:  Microbiology Results (last 7 days)       Procedure Component Value Units Date/Time    Blood culture #2 **CANNOT BE ORDERED STAT** [[id number]]  (Abnormal) Collected: 01/02/24 1156    Order Status: Completed Specimen: Blood Updated: 01/03/24 1146     CULTURE, BLOOD ([facility]) Identification and Susceptibility To Follow      Streptococcus pneumoniae     GRAM STAIN Gram Positive Cocci, probable Streptococcus      Seen in gram stain of broth only      1 of 1 Pediatric bottle positive      2 of 2 bottles positive    Blood culture #1 **CANNOT BE ORDERED STAT** [[id number]]  (Abnormal) Collected: 01/02/24 1156    Order Status: Completed Specimen: Blood Updated: 01/03/24 1145     CULTURE, BLOOD ([facility]) Susceptibility To Follow      Streptococcus pneumoniae     GRAM STAIN Gram Positive Cocci, probable Streptococcus      Seen in gram stain of broth only      1 of 1 Pediatric bottle positive    Urine culture [[id number]]  (Abnormal) Collected: 01/02/24 1305    Order Status: Completed Specimen: Urine Updated: 01/03/24 0701     Urine Culture >/= 100,000 colonies/ml Gram-negative Rods    BCID2 Panel [[id number]]  (Abnormal) Collected: 01/02/24 1156    Order Status: Completed Specimen: Blood Updated: 01/02/24 2348     CTX-M (ESBL ) N/A     IMP (Cabapenemase ) N/A     KPC resistance gene (Carbapenemase ) N/A     mcr-1 N/A     mecA ID N/A     Comment: Note: Antimicrobial resistance can occur via multiple mechanisms. A Not Detected result for antimicrobial resistance gene(s) does not indicate antimicrobial susceptibility. Subculturing is required for species identification and  susceptibility testing of   isolates.        mecA/C and MREJ (MRSA) gene N/A     NDM (Carbapenemase ) N/A     OXA-48-like (Carbapenemase ) N/A     Nurys/B (VRE gene) N/A     VIM (Carbapenemase ) N/A     Enterococcus faecalis Not Detected     Enterococcus faecium Not Detected     Listeria monocytogenes Not Detected     Staphylococcus spp. Not Detected     Staphylococcus aureus Not Detected     Staphylococcus epidermidis Not Detected     Staphylococcus lugdunensis Not Detected     Streptococcus spp. Detected     Streptococcus agalactiae (Group B) Not Detected     Streptococcus pneumoniae Detected     Streptococcus pyogenes (Group A) Not Detected     Acinetobacter calcoaceticus/baumannii complex Not Detected     Bacteroides fragilis Not Detected     Enterobacterales Not Detected     Enterobacter cloacae complex Not Detected     Escherichia coli Not Detected     Klebsiella aerogenes Not Detected     Klebsiella oxytoca Not Detected     Klebsiella pneumoniae group Not Detected     Proteus spp. Not Detected     Salmonella spp. Not Detected     Serratia marcescens Not Detected     Haemophilus influenzae Not Detected     Neisseria meningitidis Not Detected     Pseudomonas aeruginosa Not Detected     Stenotrophomonas maltophilia Not Detected     Candida albicans Not Detected     Candida auris Not Detected     Candida glabrata Not Detected     Candida krusei Not Detected     Candida parapsilosis Not Detected     Candida tropicalis Not Detected     Cryptococcus neoformans/gattii Not Detected    Narrative:      The [employer] BCID2 Panel is a multiplexed nucleic acid test intended for the use with [employer]ube® 2.0 or ChartCube® [employer] for the simultaneous qualitative detection and identification of multiple bacterial and yeast nucleic acids and select genetic determinants associated with antimicrobial resistance.  The [employer] BCID2 Panel test is performed directly on blood culture  samples identified as positive by a continuous monitoring blood culture system.  Results are intended to be interpreted in conjunction with Gram stain results.

## 2024-01-04 NOTE — PROGRESS NOTES
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  Patient was originally started on IV antibiotics however during his stay began to become progressively hypotensive.  Patient received 3 L fluid boluses which did not improve patient's blood pressure to a map greater than 65 at which point patient was initiated on Levophed for hemodynamic support.  Of note, patient is visiting [place] and reports that he has been feeling somewhat ill the last 2 days.  Admits to dysuria, fatigue, myalgia, shortness a breath.  Reports no chest pain, palpitations, fevers, chills, nausea, vomiting.  ICU was consulted for upgrade.    Hospital Course/Significant events:  01/01/2024:  Admitted to [facility]  01/02/2024:  Upgraded to ICU, initiated on Levophed    24 Hour Interval History:  NAEON. VSS, BP within wide range, O2 sats lower 90s - upper 80s but stable. Arterial line placed yesterday. Blood cultures grew Strep pneumo 2/2, awaiting sensitivities. Labs appear stable at this time with mildly worsening kidney function, elevated PT and INR.     Past Medical History:   Diagnosis Date    CKD (chronic kidney disease)     COPD (chronic obstructive pulmonary disease)     HLD (hyperlipidemia)     HTN (hypertension)     Merkel cell carcinoma     Paroxysmal A-fib     Prostate cancer     PUD (peptic ulcer disease)      Past Surgical History:    Procedure Laterality Date    INGUINAL HERNIA REPAIR      PROSTATE SURGERY      ROTATOR CUFF REPAIR       Social History     Socioeconomic History    Marital status:      Social Determinants of Health     Housing Stability: Unknown (1/3/2024)    Housing Stability Vital Sign     Unable to Pay for Housing in the Last Year: No     Current Outpatient Medications   Medication Instructions    atorvastatin (LIPITOR) 10 mg, Oral, Daily    calcitRIOL (ROCALTROL) 0.25 mcg, Oral, Daily, Takes every other day    gabapentin (NEURONTIN) 300 mg, Oral, Nightly    metoprolol tartrate (LOPRESSOR) 25 mg, Oral, 2 times daily    NON FORMULARY MEDICATION 850 mg, Oral, Daily, Umary     Current Inpatient Medications   atorvastatin  10 mg Oral Daily    calcitRIOL  0.25 mcg Oral Daily    ceFEPime (MAXIPIME) IVPB  500 mg Intravenous Q24H    gabapentin  300 mg Oral QHS    mupirocin   Nasal BID    vancomycin (VANCOCIN) IV (PEDS and ADULTS)  15 mg/kg Intravenous Q24H     Current Intravenous Infusions   sodium chloride 0.9% Stopped (01/03/24 1223)    albuterol 0.083% 10 mg/hr (01/02/24 1256)    amiodarone in dextrose 5% 0.5 mg/min (01/04/24 0527)    heparin (porcine) in D5W 12 Units/kg/hr (01/03/24 1224)    NORepinephrine bitartrate-D5W 0.14 mcg/kg/min (01/04/24 0527)       Review of Systems   Constitutional:  Positive for chills and fever. Negative for weight loss.   HENT:  Negative for congestion and sinus pain.    Eyes:  Negative for blurred vision.   Respiratory:  Negative for cough, hemoptysis and wheezing.    Cardiovascular:  Negative for chest pain and palpitations.   Gastrointestinal:  Negative for abdominal pain, nausea and vomiting.   Genitourinary:  Positive for dysuria, frequency and urgency. Negative for hematuria.   Musculoskeletal:  Positive for back pain and neck pain. Negative for myalgias.   Skin:  Negative for itching and rash.   Neurological:  Negative for dizziness, tingling, sensory change and headaches.       Objective:       Intake/Output Summary (Last 24 hours) at 1/4/2024 0558  Last data filed at 1/4/2024 0544  Gross per 24 hour   Intake 1450 ml   Output 2170 ml   Net -720 ml     Vital Signs (Most Recent):  Temp: 98.9 °F (37.2 °C) (01/04/24 0400)  Pulse: 101 (01/04/24 0530)  Resp: (!) 45 (01/04/24 0530)  BP: 130/70 (01/04/24 0530)  SpO2: (!) 90 % (01/04/24 0530)  Body mass index is 26.6 kg/m².  Weight: 72.5 kg (159 lb 13.3 oz) Vital Signs (24h Range):  Temp:  [98.6 °F (37 °C)-100 °F (37.8 °C)] 98.9 °F (37.2 °C)  Pulse:  [] 101  Resp:  [15-56] 45  SpO2:  [88 %-96 %] 90 %  BP: ()/(41-98) 130/70  Arterial Line BP: ()/(36-69) 114/52     Physical Exam  Constitutional:       General: He is not in acute distress.     Appearance: He is ill-appearing.      Comments: Sleeping upon exam. Oxygen mask in proper place   HENT:      Head:      Comments: Oxymask in place  Cardiovascular:      Rate and Rhythm: Tachycardia present.      Pulses: Normal pulses.      Heart sounds: Murmur heard.      No friction rub. No gallop.      Comments: Systolic murmur noted  Pulmonary:      Effort: Pulmonary effort is normal.      Breath sounds: No stridor. Rhonchi and rales present. No wheezing.   Abdominal:      General: Bowel sounds are normal.      Palpations: Abdomen is soft.      Tenderness: There is no abdominal tenderness. There is no guarding.   Musculoskeletal:         General: Swelling present. No deformity or signs of injury.   Neurological:      General: No focal deficit present.      Mental Status: He is alert and oriented to person, place, and time.       Lines/Drains/Airways       Drain  Duration                  Suprapubic Catheter 16 Fr. -- days              Arterial Line  Duration             Arterial Line 01/03/24 1630 Right Radial <1 day              Peripheral Intravenous Line  Duration                  Peripheral IV - Single Lumen 01/02/24 1006 18 G Right Antecubital 1 day         Peripheral IV - Single  Lumen 01/03/24 0441 20 G Anterior;Right Upper Arm 1 day                    Significant Labs:  Lab Results   Component Value Date    WBC 10.33 01/04/2024    WBC 10.33 01/04/2024    HGB 11.5 (L) 01/04/2024    HCT 35.1 (L) 01/04/2024    MCV 82.2 01/04/2024     01/04/2024     BMP  Lab Results   Component Value Date     01/04/2024    K 4.3 01/04/2024    CHLORIDE 109 01/04/2024    CO2 17 (L) 01/04/2024    BUN 81.6 (H) 01/04/2024    CREATININE 2.66 (H) 01/04/2024    CALCIUM 8.5 (L) 01/04/2024     ABG  Recent Labs   Lab 01/03/24 0735   PH 7.400   PO2 60.0*   PCO2 30.0*   HCO3 18.6*   POCBASEDEF -5.10     Mechanical Ventilation Support:  Oxygen Concentration (%): 45 (01/04/24 0445)    Significant Imaging:  Imaging Results              X-Ray Chest AP Portable (Final result)  Result time 01/02/24 21:00:06      Final result by Darrius Mcpherson MD (01/02/24 21:00:06)                   Impression:      Changes most consistent with pulmonary edema and a large right-sided pleural effusion.  This appears grossly stable from the previous exam      Electronically signed by: Darrius Mcpherson MD  Date:    01/02/2024  Time:    21:00               Narrative:    EXAMINATION:  XR CHEST AP PORTABLE    CLINICAL HISTORY:  Shortness of breath    TECHNIQUE:  Single frontal view of the chest was performed.    COMPARISON:  01/02/2024    FINDINGS:  There is significant opacification of the right hemithorax cannot rule out a large effusion.  There are calcified pleural plaques suggestive of previous X best is exposure.  There are increased markings bilaterally most consistent with pulmonary edema                                       US Retroperitoneal Complete (Final result)  Result time 01/02/24 15:46:53      Final result by Soren Miranda MD (01/02/24 15:46:53)                   Impression:      1. No hydronephrosis.  2. Medical renal disease.      Electronically signed by: Soren Miranda  Date:    01/02/2024  Time:    15:46                Narrative:    EXAMINATION:  US RETROPERITONEAL COMPLETE    CLINICAL HISTORY:  elevated BUN/Creat;    COMPARISON:  None.    FINDINGS:  Grayscale and color Doppler sonographic evaluation of the kidneys and urinary bladder.    The right kidney measures 8 cm. The left kidney measures 8 cm.   No hydronephrosis.  Heterogeneous renal parenchymal echogenicity with loss of corticomedullary differentiation.    Bladder is not visualized.                                       CT Head Without Contrast (Final result)  Result time 01/02/24 15:12:29      Final result by Alejandrina Rodriguez MD (01/02/24 15:12:29)                   Impression:      1. No acute intracranial abnormality.  2. Chronic microvascular ischemic changes.      Electronically signed by: Alejandrina Rodriguez  Date:    01/02/2024  Time:    15:12               Narrative:    EXAMINATION:  CT HEAD WITHOUT CONTRAST    CLINICAL HISTORY:  Mental status change, unknown cause;    TECHNIQUE:  Axial scans were obtained from skull base to the vertex.    Coronal and sagittal reconstructions obtained from the axial data.    Automatic exposure control was utilized to limit radiation dose.    Contrast: None    Radiation Dose:    Total DLP: 1029 mGy*cm    COMPARISON:  None    FINDINGS:  There is no acute intracranial hemorrhage or edema.  There is a small area of encephalomalacia in the left cerebellum.    There is no mass effect or midline shift.  There is diffuse parenchymal volume loss.  The basal cisterns are patent. There is no abnormal extra-axial fluid collection.  Carotid artery calcifications are noted.    The calvarium and skull base are intact.  There are bilateral mastoid effusions, right greater than left.                                       X-Ray Chest 1 View (Final result)  Result time 01/02/24 10:38:49      Final result by Eligio Shaikh MD (01/02/24 10:38:49)                   Impression:      Changes suggestive of pulmonary vascular congestion and  cardiac decompensation.    Slightly more confluent opacities at the right base infiltrate cannot be completely excluded.    Right-sided pleural effusion      Electronically signed by: Eligio Shaikh  Date:    01/02/2024  Time:    10:38               Narrative:    EXAMINATION:  XR CHEST 1 VIEW    CPT 36642    CLINICAL HISTORY:  SOB;    FINDINGS:  Examination reveals mediastinal silhouette to be within normal limits cardiac silhouette is not enlarged there is increase in interstitial and pulmonary vascular markings indicating some degree of pulmonary vascular congestion and cardiac decompensation.    Slightly more confluent opacities identified at the right base superimposed infiltrate can not be completely excluded.    There is blunting of the right costophrenic angle representing a right-sided pleural effusion    No other focal consolidative changes                                   I have reviewed the pertinent imaging within the past 24 hours.    Assessment/Plan:     Assessment  Septic Shock  Acute Cystitis without hematuria  - hx of urethral stent  Community Acquired Pneumonia  Bacteremia (gram + cocci)  Acute Hypoxic on chronic hypercarbic respiratory failure  - hx of COPD  - hx of asbestosis, pulmonary fibrosis?  Right pleural effusion  Acute renal failure  - hx of CKD with unknown baseline  Metabolic Acidosis  - lactic acidosis, respiratory compensation  NSTEMI  HFpEF, decompensated  Hyperkalemia  Hypomagnesemia  Paroxysmal Afib  Hx of prostate cancer  Hx of merkel cell carcinoma      Plan  - Continue critical care level management at this time.   - continue levophed for hemodynamic support, titrate for goal map of 65  -continue trending troponins  -cardiology following, appreciate their assistance   -TTE with EF of 60% with diastolic failure  -Trials of Lasix given  -urology consulted, appreciate their assistance   -nephrology consulted, appreciate their assistance   -pending repeat laboratory  evaluation  -broaden antibiotic coverage to vancomycin/cefepime (day #2)   -BC + Strep Pneumo, can deescalate based on sensitivities   -continue with supplement oxygen, can titrate for goal SpO2 88-92%   -p.r.n.  Nebulized treatments for wheezing  -continue with strict I's and O's  -patient's family discussing potential [place], we will discuss with case management in the morning if this is possible      CODE STATUS: Full Code   Consults: Urology, Nephrology, Cardiology  Access: Peripheral  Drains: suprapubic catheter  Analgesia/Sedation:  none    Antibiotics: Vanco/Cefepime  Diet: Diet NPO  Fluids: none  Pressors: Levophed   Oxygenation: oxymask at 12L  DVT Prophylaxis: Heparin  GI Prophylaxis: none  I&Os: pending 01/03 0701 - 01/04 0700  In: 1450 [I.V.:1088.9]  Out: 2170 [Urine:2170]        32 minutes of critical care was time spent personally by me on the following activities: development of treatment plan with patient or surrogate and bedside caregivers, discussions with consultants, evaluation of patient's response to treatment, examination of patient, ordering and performing treatments and interventions, ordering and review of laboratory studies, ordering and review of radiographic studies, pulse oximetry, re-evaluation of patient's condition.  This critical care time did not overlap with that of any other provider or involve time for any procedures.     Alexander Andrade MD  Pulmonary Critical Care Medicine  [facility] - Emergency Dept  DOS: 01/04/2024

## 2024-01-04 NOTE — PROGRESS NOTES
[facility] - [facility]    Cardiology  Progress Note    Patient Name: Francis Montero  MRN: [id number]  Admission Date: 1/2/2024  Hospital Length of Stay: 2 days  Code Status: Full Code   Attending Physician: Bernardino Ordoñez MD   Primary Care Physician: Lisa, Primary Doctor  Expected Discharge Date:   Principal Problem:SOB (shortness of breath)    Subjective:   Consultation Reason: Abnormal Troponin & AF RVR     HPI:   Mr. Montero is a 89 y/o male,  unknown to [facility], who presented to Ed with c/o SOB. Admit VS- BP 70/30, P RA sat 85%. He was given 200ml NS enroute. In Ed he was given another 1.5L NS bolus with improvement in BP. Lab significant for WBC 13.89, K+ 5.7, CO2 16, BUN/creatinine 83.4/3.10, Mg 1.50, BNP 1970, troponin 0.236, Venous lactate 3.3. EKG SR with PACs. CXR consistent with pulmonary congestion and a large bilateral pleural effusions. Echo revealing EF 60-65% with mild to  moderate AS, mild MS and mild to moderate TR. Blood and urine cultures obtained and patient started on antibiotics. [facility] has been consulted for elevated troponin    Hospital Course:  1.3.24: NAD Noted. On BIPAP. He is awake but confused, requiring Mittens. AF RVR. On Amiodarone Infusion, He has a history of AF. Hypotensive Requiring Levophed Support. Legs are Warm.    1.4.24: BIPAP Support. Awake. AF RVR. Requiring Levophed Support. On Amiodarone Infusion. On Heparin Infusion for Stroke Risk Reduction Related to AF.      PMH: Afib/Eliquis, COPD, asbestosis, Merkel cell carcinoma of left ear anxiety, esophagitis, esophageal varices, hiatal hernia, merkel cell carcinoma, prostate cancer, chronic UTIs, HLD, PUD  PSH: surgical resection with adjuvant radiotherapy to Left ear,  EGD, prostate surgery,  open shoulder rotator cuff repair  Family History:   Social History: former  smoker 40 pack years,      Previous Cardiac Diagnostics:   Echocardiogram (1.2.24):  Left Ventricle: The left ventricle is normal in size. Normal  wall thickness. Normal wall motion. There is normal systolic function with a visually estimated ejection fraction of 60 - 65%. Diastolic function cannot be reliably determined in the presence of mitral valve disease. Inconclusive left ventricular filling pressure.  Right Ventricle: Normal right ventricular cavity size. Systolic function is normal.  Aortic Valve: There is mild to moderate stenosis. Aortic valve area by VTI is 1.23 cm². Aortic valve peak velocity is 2.63 m/s. Mean gradient is 18 mmHg. The dimensionless index is 0.39. There is trace aortic regurgitation.  Mitral Valve: There is severe mitral annular calcification present. There is mild stenosis. The mean pressure gradient across the mitral valve is 6 mmHg at a heart rate of  bpm.  Tricuspid Valve: There is mild to moderate regurgitation. The estimated PA systolic pressure is at least 38 mmHg.     Echocardiogram (2.22.22):  Left Ventricle: Left ventricle is normal in size and function. Normal   wall thickness.   Right Ventricle: Right ventricle is normal in size and function. Normal   wall thickness.   Mitral Valve: Mitral valve is normal in structure and function. Mildly   calcified leaflets.   Tricuspid Valve: Tricuspid valve is normal size and function.   Aortic Valve: Aortic valve is normal in structure and function.   Moderately calcified cusps.      Review of patient's allergies indicates:  No Known Allergies    Review of Systems   Unable to perform ROS: Acuity of condition     Objective:     Vital Signs (Most Recent):  Temp: 98.4 °F (36.9 °C) (01/04/24 0800)  Pulse: 108 (01/04/24 0845)  Resp: (!) 33 (01/04/24 0845)  BP: 131/82 (01/04/24 0845)  SpO2: (!) 91 % (01/04/24 0845) Vital Signs (24h Range):  Temp:  [98.4 °F (36.9 °C)-98.9 °F (37.2 °C)] 98.4 °F (36.9 °C)  Pulse:  [] 108  Resp:  [15-51] 33  SpO2:  [88 %-96 %] 91 %  BP: ()/(42-98) 131/82  Arterial Line BP: ()/(36-70) 132/58     Weight: 72.5 kg (159 lb 13.3 oz)  Body mass  index is 26.6 kg/m².    SpO2: (!) 91 %         Intake/Output Summary (Last 24 hours) at 1/4/2024 1032  Last data filed at 1/4/2024 1000  Gross per 24 hour   Intake 1172.59 ml   Output 2000 ml   Net -827.41 ml         Lines/Drains/Airways       Drain  Duration                  Suprapubic Catheter 16 Fr. -- days              Arterial Line  Duration             Arterial Line 01/03/24 1630 Right Radial <1 day              Peripheral Intravenous Line  Duration                  Peripheral IV - Single Lumen 01/02/24 1006 18 G Right Antecubital 2 days         Peripheral IV - Single Lumen 01/03/24 0441 20 G Anterior;Right Upper Arm 1 day                  Significant Labs:   Recent Results (from the past 72 hour(s))   Protime-INR    Collection Time: 01/02/24 10:42 AM   Result Value Ref Range    PT 20.7 (H) 12.5 - 14.5 seconds    INR 1.8 (H) <=1.3   APTT    Collection Time: 01/02/24 10:42 AM   Result Value Ref Range    PTT 35.8 (H) 23.2 - 33.7 seconds   Comprehensive metabolic panel    Collection Time: 01/02/24 10:42 AM   Result Value Ref Range    Sodium Level 142 132 - 146 mmol/L    Potassium Level 5.7 (H) 3.5 - 5.1 mmol/L    Chloride 113 (H) 98 - 111 mmol/L    Carbon Dioxide 16 (L) 23 - 31 mmol/L    Glucose Level 84 75 - 121 mg/dL    Blood Urea Nitrogen 83.4 (H) 8.4 - 25.7 mg/dL    Creatinine 3.10 (H) 0.73 - 1.18 mg/dL    Calcium Level Total 8.4 (L) 8.8 - 10.0 mg/dL    Protein Total 5.7 (L) 5.8 - 7.6 gm/dL    Albumin Level 2.5 (L) 3.4 - 4.8 g/dL    Globulin 3.2 2.4 - 3.5 gm/dL    Albumin/Globulin Ratio 0.8 (L) 1.1 - 2.0 ratio    Bilirubin Total 0.7 <=1.5 mg/dL    Alkaline Phosphatase 36 (L) 40 - 150 unit/L    Alanine Aminotransferase 21 0 - 55 unit/L    Aspartate Aminotransferase 33 5 - 34 unit/L    eGFR 18 mls/min/1.73/m2   Magnesium    Collection Time: 01/02/24 10:42 AM   Result Value Ref Range    Magnesium Level 1.50 (L) 1.60 - 2.60 mg/dL   Type & Screen    Collection Time: 01/02/24 10:42 AM   Result Value Ref Range     Group & Rh AB POS     Indirect [place] GEL NEG     Specimen Outdate 01/05/2024 23:59    Troponin I    Collection Time: 01/02/24 10:42 AM   Result Value Ref Range    Troponin-I 0.236 (H) 0.000 - 0.045 ng/mL   CBC with Differential    Collection Time: 01/02/24 10:42 AM   Result Value Ref Range    WBC 13.89 (H) 4.50 - 11.50 x10(3)/mcL    RBC 4.19 (L) 4.70 - 6.10 x10(6)/mcL    Hgb 11.4 (L) 14.0 - 18.0 g/dL    Hct 37.4 (L) 42.0 - 52.0 %    MCV 89.3 80.0 - 94.0 fL    MCH 27.2 27.0 - 31.0 pg    MCHC 30.5 (L) 33.0 - 36.0 g/dL    RDW 16.0 11.5 - 17.0 %    Platelet 194 130 - 400 x10(3)/mcL    MPV 11.6 (H) 7.4 - 10.4 fL    NRBC% 0.0 %   Manual Differential    Collection Time: 01/02/24 10:42 AM   Result Value Ref Range    WBC 13.89 x10(3)/mcL    Neutrophils % 76 %    Lymphs % 8 %    Basophils % 1 %    Metamyelocytes % 7 (H) <=0 %    Myelocytes % 7 (H) <=0 %    Neutrophils Abs 10.5564 (H) 2.1 - 9.2 x10(3)/mcL    Lymphs Abs 1.1112 0.6 - 4.6 x10(3)/mcL    Basophils Abs 0.1389 0 - 0.2 x10(3)/mcL    Platelets Normal Normal, Adequate    RBC Morph Abnormal (A) Normal    Poikilocytosis 1+ (A) (none)    [place] Cells 1+ (A) (none)   Brain natriuretic peptide    Collection Time: 01/02/24 10:42 AM   Result Value Ref Range    Natriuretic Peptide 1,970.5 (H) <=100.0 pg/mL   Path Review, Peripheral Smear    Collection Time: 01/02/24 10:42 AM   Result Value Ref Range    Peripheral Smear Evaluation       The red blood cells are unremarkable. The neutrophils show a mild left shift in maturation. The lymphocytes and platelets are unremarkable.    Eduardo Burk M.D., Ph.D.    [facility] RETYPE    Collection Time: 01/02/24 11:05 AM   Result Value Ref Range    [facility] Retype AB POS    RT Blood Gas    Collection Time: 01/02/24 11:18 AM   Result Value Ref Range    Sample Type Arterial Blood     Sample site Left Radial Artery     Drawn by sd rrt     pH, Blood gas 7.300 (L) 7.350 - 7.450    pCO2, Blood gas 32.0 (L) 35.0 - 45.0 mmHg    pO2, Blood gas 63.0 (L) 80.0  - 100.0 mmHg    Sodium, Blood Gas 135 (L) 137 - 145 mmol/L    Potassium, Blood Gas 5.2 (H) 3.5 - 5.0 mmol/L    Calcium Level Ionized 1.17 1.12 - 1.23 mmol/L    TOC2, Blood gas 16.7 mmol/L    Base Excess, Blood gas -9.70 (L) -2.00 - 2.00 mmol/L    sO2, Blood gas 89.1 %    HCO3, Blood gas 15.7 (L) 22.0 - 26.0 mmol/L    THb, Blood gas 11.1 (L) 12 - 16 g/dL    O2 Hb, Blood Gas 91.3 (L) 94.0 - 97.0 %    CO Hgb 0.5 0.5 - 1.5 %    Met Hgb 0.0 (L) 0.4 - 1.5 %    Allens Test Yes     Oxygen Device, Blood gas Cannula     LPM 3.5    Blood culture #1 **CANNOT BE ORDERED STAT**    Collection Time: 01/02/24 11:56 AM    Specimen: Blood   Result Value Ref Range    CULTURE, BLOOD ([facility]) Susceptibility To Follow     CULTURE, BLOOD ([facility]) Streptococcus pneumoniae (A)     GRAM STAIN Gram Positive Cocci, probable Streptococcus (AA)     GRAM STAIN Seen in gram stain of broth only (AA)     GRAM STAIN 1 of 1 Pediatric bottle positive (AA)    Blood culture #2 **CANNOT BE ORDERED STAT**    Collection Time: 01/02/24 11:56 AM    Specimen: Blood   Result Value Ref Range    CULTURE, BLOOD ([facility]) Susceptibility To Follow     CULTURE, BLOOD ([facility]) Streptococcus pneumoniae (A)     GRAM STAIN Gram Positive Cocci, probable Streptococcus (AA)     GRAM STAIN Seen in gram stain of broth only (AA)     GRAM STAIN 1 of 1 Pediatric bottle positive (AA)     GRAM STAIN 2 of 2 bottles positive (AA)    Lactic acid, plasma    Collection Time: 01/02/24 11:56 AM   Result Value Ref Range    Lactic Acid Level 3.3 (H) 0.5 - 2.2 mmol/L   BCID2 Panel    Collection Time: 01/02/24 11:56 AM    Specimen: Blood   Result Value Ref Range    CTX-M (ESBL ) N/A Not Detected, N/A    IMP (Cabapenemase ) N/A Not Detected, N/A    KPC resistance gene (Carbapenemase ) N/A Not Detected, N/A    mcr-1 N/A Not Detected, N/A    mecA ID N/A Not Detected, N/A    mecA/C and MREJ (MRSA) gene N/A Not Detected, N/A    NDM (Carbapenemase ) N/A Not Detected, N/A     OXA-48-like (Carbapenemase ) N/A Not Detected, N/A    Nurys/B (VRE gene) N/A Not Detected, N/A    VIM (Carbapenemase ) N/A Not Detected, N/A    Enterococcus faecalis Not Detected Not Detected    Enterococcus faecium Not Detected Not Detected    Listeria monocytogenes Not Detected Not Detected    Staphylococcus spp. Not Detected Not Detected    Staphylococcus aureus Not Detected Not Detected    Staphylococcus epidermidis Not Detected Not Detected    Staphylococcus lugdunensis Not Detected Not Detected    Streptococcus spp. Detected (A) Not Detected    Streptococcus agalactiae (Group B) Not Detected Not Detected    Streptococcus pneumoniae Detected (A) Not Detected    Streptococcus pyogenes (Group A) Not Detected Not Detected    Acinetobacter calcoaceticus/baumannii complex Not Detected Not Detected    Bacteroides fragilis Not Detected Not Detected    Enterobacterales Not Detected Not Detected    Enterobacter cloacae complex Not Detected Not Detected    Escherichia coli Not Detected Not Detected    Klebsiella aerogenes Not Detected Not Detected    Klebsiella oxytoca Not Detected Not Detected    Klebsiella pneumoniae group Not Detected Not Detected    Proteus spp. Not Detected Not Detected    Salmonella spp. Not Detected Not Detected    Serratia marcescens Not Detected Not Detected    Haemophilus influenzae Not Detected Not Detected    Neisseria meningitidis Not Detected Not Detected    Pseudomonas aeruginosa Not Detected Not Detected    Stenotrophomonas maltophilia Not Detected Not Detected    Candida albicans Not Detected Not Detected    Candida auris Not Detected Not Detected    Candida glabrata Not Detected Not Detected    Candida krusei Not Detected Not Detected    Candida parapsilosis Not Detected Not Detected    Candida tropicalis Not Detected Not Detected    Cryptococcus neoformans/gattii Not Detected Not Detected   Echo    Collection Time: 01/02/24 12:00 PM   Result Value Ref Range    BSA 1.88  m2    Clifford's Biplane MOD Ejection Fraction 60 %    LVOT stroke volume 65.31 cm3    LVIDd 4.30 3.5 - 6.0 cm    LV Systolic Volume 30.10 mL    LV Systolic Volume Index 16.3 mL/m2    LVIDs 2.82 2.1 - 4.0 cm    LV Diastolic Volume 83.10 mL    LV Diastolic Volume Index 44.92 mL/m2    IVS 1.04 0.6 - 1.1 cm    LVOT diameter 2.00 cm    LVOT area 3.1 cm2    FS 34 28 - 44 %    Left Ventricle Relative Wall Thickness 0.50 cm    Posterior Wall 1.07 0.6 - 1.1 cm    LV mass 153.58 g    LV Mass Index 83 g/m2    MV Peak E Italo 1.43 m/s    TDI LATERAL 0.09 m/s    TDI SEPTAL 0.08 m/s    E/E' ratio 16.82 m/s    MV Peak A Italo 1.64 m/s    TR Max Italo 3.08 m/s    E/A ratio 0.87     E wave deceleration time 301.00 msec    LV SEPTAL E/E' RATIO 17.88 m/s    LV LATERAL E/E' RATIO 15.89 m/s    LVOT peak italo 1.02 m/s    Left Ventricular Outflow Tract Mean Velocity 0.74 cm/s    Left Ventricular Outflow Tract Mean Gradient 2.00 mmHg    RVDD 3.27 cm    TAPSE 2.75 cm    LA size 3.70 cm    LA volume (mod) 57.20 cm3    LA Volume Index (Mod) 30.9 mL/m2    AV mean gradient 18 mmHg    AV peak gradient 28 mmHg    Ao peak italo 2.63 m/s    Ao VTI 53.20 cm    LVOT peak VTI 20.80 cm    AV valve area 1.23 cm²    AV Velocity Ratio 0.39     AV index (prosthetic) 0.39     BENJAMIN by Velocity Ratio 1.22 cm²    MV mean gradient 6 mmHg    MV peak gradient 12 mmHg    MV valve area by continuity eq 1.82 cm2    MV VTI 35.8 cm    Triscuspid Valve Regurgitation Peak Gradient 38 mmHg    Mean e' 0.09 m/s    ZLVIDS -0.93     ZLVIDD -1.78    Urinalysis, Reflex to Urine Culture    Collection Time: 01/02/24  1:05 PM    Specimen: Urine   Result Value Ref Range    Color, UA Yellow Yellow, Light-Yellow, Colorless, Straw, Dark-Yellow    Appearance, UA Turbid (A) Clear    Specific Gravity, UA 1.017 1.005 - 1.030    pH, UA 5.0 5.0 - 8.5    Protein, UA 1+ (A) Negative    Glucose, UA Normal Negative, Normal    Ketones, UA Negative Negative    Blood, UA 3+ (A) Negative    Bilirubin, UA  Negative Negative    Urobilinogen, UA Normal 0.2, 1.0, Normal    Nitrites, UA Negative Negative    Leukocyte Esterase,  (A) Negative    WBC, UA 51-99 (A) None Seen, 0-2, 3-5, 0-5 /HPF    WBC Clumps, UA Few (A) None Seen, 0-5    Bacteria, UA Many (A) None Seen, Trace /HPF    Squamous Epithelial Cells, UA Trace None Seen /HPF    Mucous, UA Trace (A) None Seen /LPF    RBC, UA 50-99 (A) None Seen, 0-2, 3-5, 0-5 /HPF   Urine culture    Collection Time: 01/02/24  1:05 PM    Specimen: Urine   Result Value Ref Range    Urine Culture (A)      >/= 100,000 colonies/ml Klebsiella pneumoniae ssp pneumoniae       Susceptibility    Klebsiella pneumoniae ssp pneumoniae -  (no method available)     Amox/K Clav 16 Intermediate      Ampicillin >=32 Resistant      Cefazolin >=64 Resistant      Cefepime >=32 Resistant      Ceftriaxone >=64 Resistant      Cefuroxime >=64 Resistant      Ciprofloxacin >=4 Resistant      Gentamicin <=1 Sensitive      Levofloxacin 2 Resistant      Meropenem <=0.25 Sensitive      Nitrofurantoin 64 Intermediate      Piperacillin/Tazobactam 16 Sensitive      Trimethoprim/Sulfamethoxazole >=320 Resistant      Tetracycline >=16 Resistant      Tobramycin 8 Intermediate    Lactic Acid, Plasma    Collection Time: 01/02/24  1:58 PM   Result Value Ref Range    Lactic Acid Level 4.1 (HH) 0.5 - 2.2 mmol/L   COVID/RSV/FLU A&B PCR    Collection Time: 01/02/24  2:13 PM   Result Value Ref Range    Influenza A PCR Not Detected Not Detected    Influenza B PCR Not Detected Not Detected    Respiratory Syncytial Virus PCR Not Detected Not Detected    SARS-CoV-2 PCR Not Detected Not Detected, Negative   Troponin I    Collection Time: 01/02/24  4:35 PM   Result Value Ref Range    Troponin-I 0.251 (H) 0.000 - 0.045 ng/mL   Comprehensive Metabolic Panel    Collection Time: 01/02/24  4:35 PM   Result Value Ref Range    Sodium Level 141 132 - 146 mmol/L    Potassium Level 5.5 (H) 3.5 - 5.1 mmol/L    Chloride 114 (H) 98 - 111  mmol/L    Carbon Dioxide 12 (L) 23 - 31 mmol/L    Glucose Level 113 75 - 121 mg/dL    Blood Urea Nitrogen 80.4 (H) 8.4 - 25.7 mg/dL    Creatinine 2.83 (H) 0.73 - 1.18 mg/dL    Calcium Level Total 8.1 (L) 8.8 - 10.0 mg/dL    Protein Total 5.2 (L) 5.8 - 7.6 gm/dL    Albumin Level 2.2 (L) 3.4 - 4.8 g/dL    Globulin 3.0 2.4 - 3.5 gm/dL    Albumin/Globulin Ratio 0.7 (L) 1.1 - 2.0 ratio    Bilirubin Total 0.4 <=1.5 mg/dL    Alkaline Phosphatase 34 (L) 40 - 150 unit/L    Alanine Aminotransferase 20 0 - 55 unit/L    Aspartate Aminotransferase 37 (H) 5 - 34 unit/L    eGFR 20 mls/min/1.73/m2   Magnesium    Collection Time: 01/02/24  4:35 PM   Result Value Ref Range    Magnesium Level 2.00 1.60 - 2.60 mg/dL   Phosphorus    Collection Time: 01/02/24  4:35 PM   Result Value Ref Range    Phosphorus Level 3.4 2.3 - 4.7 mg/dL   CBC with Differential    Collection Time: 01/02/24  4:37 PM   Result Value Ref Range    WBC 7.77 4.50 - 11.50 x10(3)/mcL    RBC 4.04 (L) 4.70 - 6.10 x10(6)/mcL    Hgb 11.0 (L) 14.0 - 18.0 g/dL    Hct 36.0 (L) 42.0 - 52.0 %    MCV 89.1 80.0 - 94.0 fL    MCH 27.2 27.0 - 31.0 pg    MCHC 30.6 (L) 33.0 - 36.0 g/dL    RDW 16.1 11.5 - 17.0 %    Platelet 167 130 - 400 x10(3)/mcL    MPV 11.4 (H) 7.4 - 10.4 fL    NRBC% 0.5 %   Manual Differential    Collection Time: 01/02/24  4:37 PM   Result Value Ref Range    WBC 7.77 x10(3)/mcL    Neutrophils % 74 %    Lymphs % 9 %    Metamyelocytes % 16 (H) <=0 %    Myelocytes % 1 (H) <=0 %    Neutrophils Abs 5.7498 2.1 - 9.2 x10(3)/mcL    Lymphs Abs 0.6993 0.6 - 4.6 x10(3)/mcL    Platelets Normal Normal, Adequate    RBC Morph Abnormal (A) Normal    Poikilocytosis Slight (A) (none)    Flomot Cells Slight (A) (none)   Lactic Acid, Plasma    Collection Time: 01/02/24  6:52 PM   Result Value Ref Range    Lactic Acid Level 3.6 (HH) 0.5 - 2.2 mmol/L   RT Blood Gas    Collection Time: 01/02/24  8:59 PM   Result Value Ref Range    Sample Type Arterial Blood     Sample site Right Radial  Artery     Drawn by RF RRT     pH, Blood gas 7.340 (L) 7.350 - 7.450    pCO2, Blood gas 32.0 (L) 35.0 - 45.0 mmHg    pO2, Blood gas 58.0 (L) 80.0 - 100.0 mmHg    Sodium, Blood Gas 136 (L) 137 - 145 mmol/L    Potassium, Blood Gas 4.9 3.5 - 5.0 mmol/L    Calcium Level Ionized 1.09 (L) 1.12 - 1.23 mmol/L    TOC2, Blood gas 18.3 mmol/L    Base Excess, Blood gas -7.60 (L) -2.00 - 2.00 mmol/L    sO2, Blood gas 87.8 %    HCO3, Blood gas 17.3 (L) 22.0 - 26.0 mmol/L    THb, Blood gas 9.7 (L) 12 - 16 g/dL    O2 Hb, Blood Gas 91.0 (L) 94.0 - 97.0 %    CO Hgb 2.7 (H) 0.5 - 1.5 %    Met Hgb 0.0 (L) 0.4 - 1.5 %    Allens Test Yes     Oxygen Device, Blood gas Face Mask     LPM 10.0    MRSA PCR    Collection Time: 01/02/24 10:07 PM   Result Value Ref Range    MRSA PCR SCRN ([facility]) Not Detected Not Detected   Troponin I    Collection Time: 01/02/24 10:34 PM   Result Value Ref Range    Troponin-I 0.290 (H) 0.000 - 0.045 ng/mL   Lactic Acid, Plasma    Collection Time: 01/02/24 10:34 PM   Result Value Ref Range    Lactic Acid Level 2.9 (H) 0.5 - 2.2 mmol/L   Renal Function Panel    Collection Time: 01/02/24 10:34 PM   Result Value Ref Range    Sodium Level 143 132 - 146 mmol/L    Potassium Level 4.9 3.5 - 5.1 mmol/L    Chloride 115 (H) 98 - 111 mmol/L    Carbon Dioxide 14 (L) 23 - 31 mmol/L    Glucose Level 73 (L) 75 - 121 mg/dL    Blood Urea Nitrogen 80.9 (H) 8.4 - 25.7 mg/dL    Creatinine 2.65 (H) 0.73 - 1.18 mg/dL    Calcium Level Total 8.5 (L) 8.8 - 10.0 mg/dL    Albumin Level 2.2 (L) 3.4 - 4.8 g/dL    Phosphorus Level 3.6 2.3 - 4.7 mg/dL    eGFR 22 mls/min/1.73/m2   Hemoglobin and Hematocrit    Collection Time: 01/02/24 10:34 PM   Result Value Ref Range    Hgb 11.5 (L) 14.0 - 18.0 g/dL    Hct 37.2 (L) 42.0 - 52.0 %   Phosphorus    Collection Time: 01/03/24  2:18 AM   Result Value Ref Range    Phosphorus Level 3.8 2.3 - 4.7 mg/dL   Magnesium    Collection Time: 01/03/24  2:18 AM   Result Value Ref Range    Magnesium Level 2.00 1.60 -  2.60 mg/dL   Comprehensive Metabolic Panel    Collection Time: 01/03/24  2:18 AM   Result Value Ref Range    Sodium Level 141 132 - 146 mmol/L    Potassium Level 5.1 3.5 - 5.1 mmol/L    Chloride 112 (H) 98 - 111 mmol/L    Carbon Dioxide 16 (L) 23 - 31 mmol/L    Glucose Level 106 75 - 121 mg/dL    Blood Urea Nitrogen 80.2 (H) 8.4 - 25.7 mg/dL    Creatinine 2.59 (H) 0.73 - 1.18 mg/dL    Calcium Level Total 8.7 (L) 8.8 - 10.0 mg/dL    Protein Total 6.1 5.8 - 7.6 gm/dL    Albumin Level 2.3 (L) 3.4 - 4.8 g/dL    Globulin 3.8 (H) 2.4 - 3.5 gm/dL    Albumin/Globulin Ratio 0.6 (L) 1.1 - 2.0 ratio    Bilirubin Total 0.4 <=1.5 mg/dL    Alkaline Phosphatase 47 40 - 150 unit/L    Alanine Aminotransferase 21 0 - 55 unit/L    Aspartate Aminotransferase 47 (H) 5 - 34 unit/L    eGFR 22 mls/min/1.73/m2   CBC with Differential    Collection Time: 01/03/24  2:18 AM   Result Value Ref Range    WBC 8.60 4.50 - 11.50 x10(3)/mcL    RBC 4.51 (L) 4.70 - 6.10 x10(6)/mcL    Hgb 12.2 (L) 14.0 - 18.0 g/dL    Hct 38.0 (L) 42.0 - 52.0 %    MCV 84.3 80.0 - 94.0 fL    MCH 27.1 27.0 - 31.0 pg    MCHC 32.1 (L) 33.0 - 36.0 g/dL    RDW 15.9 11.5 - 17.0 %    Platelet 191 130 - 400 x10(3)/mcL    MPV 11.5 (H) 7.4 - 10.4 fL    NRBC% 0.0 %   Manual Differential    Collection Time: 01/03/24  2:18 AM   Result Value Ref Range    WBC 8.6 x10(3)/mcL    Neutrophils % 82 %    Lymphs % 9 %    Metamyelocytes % 9 (H) <=0 %    Neutrophils Abs 7.052 2.1 - 9.2 x10(3)/mcL    Lymphs Abs 0.774 0.6 - 4.6 x10(3)/mcL    Platelets Normal Normal, Adequate    RBC Morph Normal Normal   RT Blood Gas    Collection Time: 01/03/24  4:52 AM   Result Value Ref Range    Sample Type Arterial Blood     Sample site Right Radial Artery     Drawn by kl rt     pH, Blood gas 7.410 7.350 - 7.450    pCO2, Blood gas 30.0 (L) 35.0 - 45.0 mmHg    pO2, Blood gas 56.0 (L) 80.0 - 100.0 mmHg    Sodium, Blood Gas 135 (L) 137 - 145 mmol/L    Potassium, Blood Gas 4.7 3.5 - 5.0 mmol/L    Calcium Level  Ionized 1.13 1.12 - 1.23 mmol/L    TOC2, Blood gas 19.9 mmol/L    Base Excess, Blood gas -4.60 mmol/L    sO2, Blood gas 89.0 %    HCO3, Blood gas 19.0 (L) 22.0 - 26.0 mmol/L    Allens Test Yes     Oxygen Device, Blood gas Oxy Mask     LPM 15    RT Blood Gas    Collection Time: 01/03/24  7:35 AM   Result Value Ref Range    Sample Type Arterial Blood     Sample site Left Brachial Artery     Drawn by sd rrt     pH, Blood gas 7.400 7.350 - 7.450    pCO2, Blood gas 30.0 (L) 35.0 - 45.0 mmHg    pO2, Blood gas 60.0 (L) 80.0 - 100.0 mmHg    Sodium, Blood Gas 134 (L) 137 - 145 mmol/L    Potassium, Blood Gas 4.6 3.5 - 5.0 mmol/L    Calcium Level Ionized 1.15 1.12 - 1.23 mmol/L    TOC2, Blood gas 19.5 mmol/L    Base Excess, Blood gas -5.10 mmol/L    sO2, Blood gas 91.0 %    HCO3, Blood gas 18.6 (L) 22.0 - 26.0 mmol/L    Allens Test N/A     MODE CPAP     FIO2, Blood gas 55 %    CPAP 10 cm H2O   APTT    Collection Time: 01/03/24 11:20 AM   Result Value Ref Range    PTT 44.8 (H) 23.2 - 33.7 seconds   Protime-INR    Collection Time: 01/03/24 11:20 AM   Result Value Ref Range    PT 19.7 (H) 12.5 - 14.5 seconds    INR 1.7 (H) <=1.3   Vancomycin, Random    Collection Time: 01/03/24  7:13 PM   Result Value Ref Range    Vanc Lvl Random 14.6 (L) 15.0 - 20.0 ug/ml   PTT Heparin Monitoring    Collection Time: 01/03/24  7:13 PM   Result Value Ref Range    PTT Heparin Monitor 82.3 (H) 23.2 - 33.7 seconds   Comprehensive Metabolic Panel    Collection Time: 01/04/24  1:08 AM   Result Value Ref Range    Sodium Level 138 132 - 146 mmol/L    Potassium Level 4.3 3.5 - 5.1 mmol/L    Chloride 109 98 - 111 mmol/L    Carbon Dioxide 17 (L) 23 - 31 mmol/L    Glucose Level 159 (H) 75 - 121 mg/dL    Blood Urea Nitrogen 81.6 (H) 8.4 - 25.7 mg/dL    Creatinine 2.66 (H) 0.73 - 1.18 mg/dL    Calcium Level Total 8.5 (L) 8.8 - 10.0 mg/dL    Protein Total 5.1 (L) 5.8 - 7.6 gm/dL    Albumin Level 2.0 (L) 3.4 - 4.8 g/dL    Globulin 3.1 2.4 - 3.5 gm/dL     Albumin/Globulin Ratio 0.6 (L) 1.1 - 2.0 ratio    Bilirubin Total 0.4 <=1.5 mg/dL    Alkaline Phosphatase 70 40 - 150 unit/L    Alanine Aminotransferase 18 0 - 55 unit/L    Aspartate Aminotransferase 39 (H) 5 - 34 unit/L    eGFR 22 mls/min/1.73/m2   CBC with Differential    Collection Time: 01/04/24  1:08 AM   Result Value Ref Range    WBC 10.33 4.50 - 11.50 x10(3)/mcL    RBC 4.27 (L) 4.70 - 6.10 x10(6)/mcL    Hgb 11.5 (L) 14.0 - 18.0 g/dL    Hct 35.1 (L) 42.0 - 52.0 %    MCV 82.2 80.0 - 94.0 fL    MCH 26.9 (L) 27.0 - 31.0 pg    MCHC 32.8 (L) 33.0 - 36.0 g/dL    RDW 15.9 11.5 - 17.0 %    Platelet 165 130 - 400 x10(3)/mcL    MPV 12.3 (H) 7.4 - 10.4 fL    NRBC% 0.0 %   Manual Differential    Collection Time: 01/04/24  1:08 AM   Result Value Ref Range    WBC 10.33 x10(3)/mcL    Neutrophils % 94 %    Lymphs % 4 %    Metamyelocytes % 2 (H) <=0 %    Neutrophils Abs 9.7102 (H) 2.1 - 9.2 x10(3)/mcL    Lymphs Abs 0.4132 (L) 0.6 - 4.6 x10(3)/mcL    Platelets Adequate Normal, Adequate    RBC Morph Normal Normal   APTT    Collection Time: 01/04/24  1:09 AM   Result Value Ref Range    PTT 79.3 (H) 23.2 - 33.7 seconds     Telemetry:  AF RVR    Physical Exam  Vitals and nursing note reviewed.   Constitutional:       General: He is not in acute distress.     Appearance: He is ill-appearing.   HENT:      Head: Normocephalic.   Cardiovascular:      Rate and Rhythm: Tachycardia present. Rhythm irregular.   Pulmonary:      Effort: Pulmonary effort is normal. No respiratory distress.      Comments: Right Posterior Breath Sounds are Diminished. BIPAP 45% FIO2  Musculoskeletal:      Cervical back: Neck supple.      Right lower leg: No edema.      Left lower leg: No edema.      Comments: Legs are Warm   Skin:     General: Skin is warm and dry.   Neurological:      Mental Status: He is alert. He is confused.      Comments: Awake/Alert with Intermittent Confusion.   Psychiatric:      Comments:          Current Inpatient  Medications:    Current Facility-Administered Medications:     0.9%  NaCl infusion, , Intravenous, Continuous, Bernardino Ordoñez MD, Stopped at 01/03/24 1223    acetaminophen tablet 1,000 mg, 1,000 mg, Oral, Q6H PRN, Grisel Valentine, DELP, 1,000 mg at 01/02/24 1630    acetaminophen tablet 650 mg, 650 mg, Oral, Q4H PRN, Grisel Valentine FNP    albuterol nebulizer solution, 10 mg/hr, Nebulization, Continuous, Mendoza Mckeon MD, Last Rate: 12 mL/hr at 01/02/24 1256, 10 mg/hr at 01/02/24 1256    albuterol-ipratropium 2.5 mg-0.5 mg/3 mL nebulizer solution 3 mL, 3 mL, Nebulization, Q4H PRN, Grisel Valentine FNP, 3 mL at 01/03/24 0229    amiodarone 360 mg/200 mL (1.8 mg/mL) infusion, 0.5 mg/min, Intravenous, Continuous, Manny Sawant DO, Last Rate: 16.7 mL/hr at 01/04/24 0813, 0.5 mg/min at 01/04/24 0813    atorvastatin tablet 10 mg, 10 mg, Oral, Daily, Grisel Valentine FNP    calcitRIOL capsule 0.25 mcg, 0.25 mcg, Oral, Daily, Grisel Valentine FNP    ceFEPIme (MAXIPIME) 500 mg in dextrose 5 % (D5W) 100 mL IVPB, 500 mg, Intravenous, Q24H, Manny Sawant DO, Stopped at 01/04/24 0018    gabapentin capsule 300 mg, 300 mg, Oral, QHS, Grisel Valentine FNP    heparin 25,000 units in dextrose 5% (100 units/ml) IV bolus from bag - ADDITIONAL PRN BOLUS - 30 units/kg, 30 Units/kg (Adjusted), Intravenous, PRN, Nitin, Rein T, FNP    heparin 25,000 units in dextrose 5% (100 units/ml) IV bolus from bag - ADDITIONAL PRN BOLUS - 60 units/kg, 60 Units/kg (Adjusted), Intravenous, PRN, Charles Taveras T, FNP    heparin 25,000 units in dextrose 5% 250 mL (100 units/mL) infusion LOW INTENSITY nomogram - [facility], 0-40 Units/kg/hr (Adjusted), Intravenous, Continuous, Nitin, Charles T, FNP, Last Rate: 7.9 mL/hr at 01/04/24 0816, 12 Units/kg/hr at 01/04/24 0816    melatonin tablet 6 mg, 6 mg, Oral, Nightly PRN, Grisel Valentine, FNP    morphine injection 1 mg, 1 mg, Intravenous, Q8H PRN, Suzanne,  Thomas QUILES MD, 1 mg at 01/03/24 1833    mupirocin 2 % ointment, , Nasal, BID, Bernardino Ordoñez MD, Given at 01/04/24 0811    naloxone 0.4 mg/mL injection 0.02 mg, 0.02 mg, Intravenous, PRN, Grisel Valentine, NADIR    NORepinephrine 8 mg in dextrose 5% 250 mL infusion, 0-3 mcg/kg/min, Intravenous, Continuous, Fadumo Ramirez, DO, Last Rate: 17.3 mL/hr at 01/04/24 0814, 0.12 mcg/kg/min at 01/04/24 0814    ondansetron injection 4 mg, 4 mg, Intravenous, Q4H PRN, Grisel Valentine FNP    prochlorperazine injection Soln 5 mg, 5 mg, Intravenous, Q6H PRN, Grisel Valentine, NADIR    simethicone chewable tablet 80 mg, 1 tablet, Oral, QID PRN, Grisel Valentine, NADIR    sodium chloride 0.9% flush 10 mL, 10 mL, Intravenous, PRN, Grisel Valentine, FNP    sodium chloride 0.9% flush 10 mL, 10 mL, Intravenous, PRN, Manny Sawant DO    Pharmacy to dose Vancomycin consult, , , Once **AND** vancomycin - pharmacy to dose, , Intravenous, pharmacy to manage frequency, Manny Sawant,     vancomycin 1.25 g in dextrose 5% 250 mL IVPB (ready to mix), 15 mg/kg, Intravenous, Q24H, Bernardino Ordoñez MD, Stopped at 01/03/24 5924    VTE Risk Mitigation (From admission, onward)           Ordered     heparin 25,000 units in dextrose 5% 250 mL (100 units/mL) infusion LOW INTENSITY nomogram - [facility]  Continuous        Question:  Begin at (units/kg/hr)  Answer:  12    01/03/24 1042     heparin 25,000 units in dextrose 5% (100 units/ml) IV bolus from bag - ADDITIONAL PRN BOLUS - 60 units/kg  As needed (PRN)        Question:  Heparin Infusion Adjustment (DO NOT MODIFY ANSWER)  Answer:  [email or web address]\Images\Pharmacy\HeparinInfusions\heparin LOW INTENSITY nomogram for [facility] [id number].pdf    01/03/24 1042     heparin 25,000 units in dextrose 5% (100 units/ml) IV bolus from bag - ADDITIONAL PRN BOLUS - 30 units/kg  As needed (PRN)        Question:  Heparin Infusion Adjustment (DO NOT MODIFY ANSWER)  Answer:   [email or web address]\Images\Pharmacy\HeparinInfusions\heparin LOW INTENSITY nomogram for [facility] [id number].pdf    01/03/24 1042     Reason for No Pharmacological VTE Prophylaxis  Once        Question:  Reasons:  Answer:  Already adequately anticoagulated on oral Anticoagulants    01/02/24 1329     IP VTE HIGH RISK PATIENT  Once         01/02/24 1329     Place sequential compression device  Until discontinued         01/02/24 1329                  Assessment:   Acute on Chronic Diastolic Heart Failure    - EF 60-65%  Atrial Fibrillation (Unspecified)- Now with RVR    - [id number]: 4    - On Eliquis Outpatient- Now on Heparin Infusion for Stroke Risk Reduction  Bilateral Pleural Effusion (Right > Left)  NSTEMI- Type II in the Setting of Heart Failure & Septic Shock  Septic Shock Requiring Vasopressor Support    - Strept Bacteremia/Urine Culture Positive for Klebsiella Pneumoniae   Valvular Heart Disease    - AS: Mild to Moderate, TR: Mild to Moderate, MS: Mild with Severe MAC  COPD  Acute Kidney Failure  Lactic Acidosis  Confusion    - CT Head Normal  Hyperlipidemia    - On Statin   History of Esophageal Varices  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 Resume when BP Permits.  If need for Thoracentesis is ruled out, will transition back to Eliquis.    NADIR Perez  Cardiology  [facility] - [address] [place] [address]  01/04/2024     Physician addendum:  I have seen and examined this patient as a split-shared visit with the DAYANA d/t complicated medical management of above problems written in assessment and high acuity requiring physician expertise in medical decision-making. I  performed the substantive portion of the history and exam. Above medical decision-making is also formulated by me.    Cardiovascular exam:  S1, S2  Lungs:  fine crackles at bases.  Extremities:  1+ edema bilaterally    Plan:  Continue amiodarone and heparin as above.      Mukund Melo MD  Cardiologist

## 2024-01-04 NOTE — PROGRESS NOTES
Pharmacist Renal Dose Adjustment Note    Francis Montero is a 89 y.o. male being treated with the medication Merrem    Patient Data:    Vital Signs (Most Recent):  Temp: 98.9 °F (37.2 °C) (01/04/24 1200)  Pulse: 96 (01/04/24 1215)  Resp: (!) 24 (01/04/24 1215)  BP: 108/62 (01/04/24 1215)  SpO2: 95 % (01/04/24 1215) Vital Signs (72h Range):  Temp:  [98.2 °F (36.8 °C)-100 °F (37.8 °C)]   Pulse:  []   Resp:  [15-56]   BP: ()/(37-98)   SpO2:  [84 %-100 %]   Arterial Line BP: ()/(36-79)      Recent Labs   Lab 01/02/24 2234 01/03/24  0218 01/04/24  0108   CREATININE 2.65* 2.59* 2.66*     Serum creatinine: 2.66 mg/dL (H) 01/04/24 0108  Estimated creatinine clearance: 14.8 mL/min (A)    Merrem 1 gm IVPB Q8H will be changed to Merrem 1 gm IVPB Q12H based on patient's eCrCl ~ 15 mL/min per pharmacy protocol.    Pharmacist's Name: Leslie Albright  Pharmacist's Extension: [phone]

## 2024-01-04 NOTE — CARE UPDATE
I spoke with the patient's wife and son at bedside.  The patient was placed on CPAP this morning for increasing respiratory distress.  With that he is quite comfortable.  His O2 saturation is 97% and he has in no distress.    We discussed life support, mechanical ventilation and CPR.  The chest x-ray was shown to them as well which showed severe bilateral infiltrates right greater than left.  Apparently the patient has not discussed any of the above in the past and was fairly functional prior to this.  Currently he does not need to be intubated but certainly high-risk of progressive disease.  However both the wife and son are very leery of mechanical ventilation and CPR.  At this point they are leaning towards chemical code only.    I have asked them to make a definitive decision before they leave today.  I informed the nurse of the above discussion and she will place the order patient on family's wishes prior to them leaving the hospital today.      F/U: Wife indicates that they have decided on chemical code only.   No CPR or intubation

## 2024-01-04 NOTE — PROCEDURES
"Francis Montero is a 89 y.o. male patient.    Temp: 98.5 °F (36.9 °C) (01/04/24 1600)  Pulse: 105 (01/04/24 1645)  Resp: (!) 22 (01/04/24 1645)  BP: 108/61 (01/04/24 1630)  SpO2: 97 % (01/04/24 1645)  Weight: 72.5 kg (159 lb 13.3 oz) (01/02/24 2220)  Height: 5' 5" (165.1 cm) (01/03/24 1221)    PICC  Time out: Immediately prior to procedure a time out was called to verify the correct patient, procedure, equipment, support staff and site/side marked as required  Indications: med administration  Preparation: skin prepped with ChloraPrep  Skin prep agent dried: skin prep agent completely dried prior to procedure  Sterile barriers: all five maximum sterile barriers used - cap, mask, sterile gown, sterile gloves, and large sterile sheet  Hand hygiene: hand hygiene performed prior to central venous catheter insertion  Location details: left basilic  Catheter type: triple lumen  Catheter size: 5 Fr  Catheter Length: 39cm            Name   1/4/2024    "

## 2024-01-04 NOTE — NURSING
Nurses Note -- 4 Eyes      1/4/2024   12:35 AM      Skin assessed during: Daily Assessment      [x] No Altered Skin Integrity Present    [x]Prevention Measures Documented      [] Yes- Altered Skin Integrity Present or Discovered   [] LDA Added if Not in Epic (Describe Wound)   [] New Altered Skin Integrity was Present on Admit and Documented in LDA   [] Wound Image Taken    Wound Care Consulted? No    Attending Nurse:  Kortney Harley RN    Second RN/Staff Member:   Steffany Mart RN

## 2024-01-04 NOTE — PROGRESS NOTES
UROLOGY  PROGRESS  NOTE    Francis Montero 3/1/1929  [id number]  1/4/2024    Patient resting in bed   On CPAP   Unable to transport for CT due to tenuous respiratory status  Remains on Levophed, amiodarone, heparin gtt  Family at bedside during rounds    900 mL urine output overnight  WBC 10.3  H&H 11.5/35.1   BUN/creatinine 81.6/2.66    Urine culture with ESBL Klebsiella   Blood culture x2 with Gram-positive cocci probable Streptococcus, sensitivities pending    Intake/Output:  I/O this shift:  In: 0   Out: 225 [Urine:225]  I/O last 3 completed shifts:  In: 3933.6 [I.V.:2140.1; IV Piggyback:1793.6]  Out: 4250 [Urine:4250]     Exam:    NAD  Card: a-fib  Resp: on CPAP  : yellow urine draining to  bag. SPT site c/d/I without redness or drainage     Recent Results (from the past 24 hour(s))   APTT    Collection Time: 01/03/24 11:20 AM   Result Value Ref Range    PTT 44.8 (H) 23.2 - 33.7 seconds   Protime-INR    Collection Time: 01/03/24 11:20 AM   Result Value Ref Range    PT 19.7 (H) 12.5 - 14.5 seconds    INR 1.7 (H) <=1.3   Vancomycin, Random    Collection Time: 01/03/24  7:13 PM   Result Value Ref Range    Vanc Lvl Random 14.6 (L) 15.0 - 20.0 ug/ml   PTT Heparin Monitoring    Collection Time: 01/03/24  7:13 PM   Result Value Ref Range    PTT Heparin Monitor 82.3 (H) 23.2 - 33.7 seconds   Comprehensive Metabolic Panel    Collection Time: 01/04/24  1:08 AM   Result Value Ref Range    Sodium Level 138 132 - 146 mmol/L    Potassium Level 4.3 3.5 - 5.1 mmol/L    Chloride 109 98 - 111 mmol/L    Carbon Dioxide 17 (L) 23 - 31 mmol/L    Glucose Level 159 (H) 75 - 121 mg/dL    Blood Urea Nitrogen 81.6 (H) 8.4 - 25.7 mg/dL    Creatinine 2.66 (H) 0.73 - 1.18 mg/dL    Calcium Level Total 8.5 (L) 8.8 - 10.0 mg/dL    Protein Total 5.1 (L) 5.8 - 7.6 gm/dL    Albumin Level 2.0 (L) 3.4 - 4.8 g/dL    Globulin 3.1 2.4 - 3.5 gm/dL    Albumin/Globulin Ratio 0.6 (L) 1.1 - 2.0 ratio    Bilirubin Total 0.4 <=1.5 mg/dL    Alkaline  Phosphatase 70 40 - 150 unit/L    Alanine Aminotransferase 18 0 - 55 unit/L    Aspartate Aminotransferase 39 (H) 5 - 34 unit/L    eGFR 22 mls/min/1.73/m2   CBC with Differential    Collection Time: 01/04/24  1:08 AM   Result Value Ref Range    WBC 10.33 4.50 - 11.50 x10(3)/mcL    RBC 4.27 (L) 4.70 - 6.10 x10(6)/mcL    Hgb 11.5 (L) 14.0 - 18.0 g/dL    Hct 35.1 (L) 42.0 - 52.0 %    MCV 82.2 80.0 - 94.0 fL    MCH 26.9 (L) 27.0 - 31.0 pg    MCHC 32.8 (L) 33.0 - 36.0 g/dL    RDW 15.9 11.5 - 17.0 %    Platelet 165 130 - 400 x10(3)/mcL    MPV 12.3 (H) 7.4 - 10.4 fL    NRBC% 0.0 %   Manual Differential    Collection Time: 01/04/24  1:08 AM   Result Value Ref Range    WBC 10.33 x10(3)/mcL    Neutrophils % 94 %    Lymphs % 4 %    Metamyelocytes % 2 (H) <=0 %    Neutrophils Abs 9.7102 (H) 2.1 - 9.2 x10(3)/mcL    Lymphs Abs 0.4132 (L) 0.6 - 4.6 x10(3)/mcL    Platelets Adequate Normal, Adequate    RBC Morph Normal Normal   APTT    Collection Time: 01/04/24  1:09 AM   Result Value Ref Range    PTT 79.3 (H) 23.2 - 33.7 seconds     Urine culture [[id number]] (Abnormal)  Collected: 01/02/24 1305   Order Status: Completed Specimen: Urine Updated: 01/04/24 0636    Urine Culture >/= 100,000 colonies/ml Klebsiella pneumoniae ssp pneumoniae Abnormal     Comment: ESBL (Extended spectrum beta-lactamase)      Susceptibility     Klebsiella pneumoniae ssp pneumoniae     Not Specified     Amox/K Clav 16 Intermediate     Ampicillin >=32 Resistant     Cefazolin >=64 Resistant     Cefepime >=32 Resistant     Ceftriaxone >=64 Resistant     Cefuroxime >=64 Resistant     Ciprofloxacin >=4 Resistant     Gentamicin <=1 Sensitive     Levofloxacin 2 Resistant     Meropenem <=0.25 Sensitive     Nitrofurantoin 64 Intermediate     Piperacillin/Tazobactam 16 Sensitive     Tetracycline >=16 Resistant     Tobramycin 8 Intermediate     Trimethoprim/Sulfamethoxazole >=320 Resistant               Linear View         Blood culture #1 **CANNOT BE ORDERED STAT**  [[id number]] (Abnormal) Collected: 01/02/24 1156   Order Status: Completed Specimen: Blood Updated: 01/04/24 0731    CULTURE, BLOOD ([facility]) Susceptibility To Follow     Streptococcus pneumoniae Abnormal     GRAM STAIN Gram Positive Cocci, probable Streptococcus Panic      Seen in gram stain of broth only Panic      1 of 1 Pediatric bottle positive Panic    Blood culture #2 **CANNOT BE ORDERED STAT** [[id number]] (Abnormal) Collected: 01/02/24 1156   Order Status: Completed Specimen: Blood Updated: 01/04/24 0731    CULTURE, BLOOD ([facility]) Susceptibility To Follow     Streptococcus pneumoniae Abnormal     GRAM STAIN Gram Positive Cocci, probable Streptococcus Panic      Seen in gram stain of broth only Panic      1 of 1 Pediatric bottle positive Panic      2 of 2 bottles positive Panic        Assessment:  -UTI with chronic ureteral stent and SPT  -bacteremia, sepsis  -acute on chronic respiratory failure  -STEPHEN on CKD  -h/o prostate cancer with stricture s/t radiation requiring chronic ureteral stent    Plan:  -CT abdomen/pelvis once stable enough for transfer for further evaluation, r/o abscess  -Otherwise, continue current management per ICU  -Following      Arianna Ornelas NP

## 2024-01-05 LAB
ABS NEUT (OLG): 8.54 X10(3)/MCL (ref 2.1–9.2)
ALBUMIN SERPL-MCNC: 1.8 G/DL (ref 3.4–4.8)
ALBUMIN/GLOB SERPL: 0.4 RATIO (ref 1.1–2)
ALP SERPL-CCNC: 80 UNIT/L (ref 40–150)
ALT SERPL-CCNC: 15 UNIT/L (ref 0–55)
APPEARANCE UR: ABNORMAL
APTT PPP: 67.6 SECONDS (ref 23.2–33.7)
AST SERPL-CCNC: 34 UNIT/L (ref 5–34)
BACTERIA #/AREA URNS AUTO: ABNORMAL /HPF
BACTERIA BLD CULT: ABNORMAL
BACTERIA BLD CULT: ABNORMAL
BILIRUB SERPL-MCNC: 0.4 MG/DL
BILIRUB UR QL STRIP.AUTO: NEGATIVE
BUN SERPL-MCNC: 86.3 MG/DL (ref 8.4–25.7)
CALCIUM SERPL-MCNC: 9.6 MG/DL (ref 8.8–10)
CHLORIDE SERPL-SCNC: 110 MMOL/L (ref 98–111)
CO2 SERPL-SCNC: 19 MMOL/L (ref 23–31)
COLOR UR AUTO: ABNORMAL
CREAT SERPL-MCNC: 2.63 MG/DL (ref 0.73–1.18)
ERYTHROCYTE [DISTWIDTH] IN BLOOD BY AUTOMATED COUNT: 15.8 % (ref 11.5–17)
GFR SERPLBLD CREATININE-BSD FMLA CKD-EPI: 22 MLS/MIN/1.73/M2
GLOBULIN SER-MCNC: 4.1 GM/DL (ref 2.4–3.5)
GLUCOSE SERPL-MCNC: 114 MG/DL (ref 75–121)
GLUCOSE UR QL STRIP.AUTO: NORMAL
GRAM STN SPEC: ABNORMAL
HCT VFR BLD AUTO: 37.3 % (ref 42–52)
HGB BLD-MCNC: 12 G/DL (ref 14–18)
INSTRUMENT WBC (OLG): 9.49 X10(3)/MCL
KETONES UR QL STRIP.AUTO: NEGATIVE
LEUKOCYTE ESTERASE UR QL STRIP.AUTO: 250
LYMPHOCYTES NFR BLD MANUAL: 0.57 X10(3)/MCL
LYMPHOCYTES NFR BLD MANUAL: 6 %
MCH RBC QN AUTO: 26.5 PG (ref 27–31)
MCHC RBC AUTO-ENTMCNC: 32.2 G/DL (ref 33–36)
MCV RBC AUTO: 82.5 FL (ref 80–94)
MONOCYTES NFR BLD MANUAL: 0.38 X10(3)/MCL (ref 0.1–1.3)
MONOCYTES NFR BLD MANUAL: 4 %
NEUTROPHILS NFR BLD MANUAL: 90 %
NITRITE UR QL STRIP.AUTO: NEGATIVE
NRBC BLD AUTO-RTO: 0 %
PH UR STRIP.AUTO: 5.5 [PH]
PLATELET # BLD AUTO: 99 X10(3)/MCL (ref 130–400)
PLATELET # BLD EST: ABNORMAL 10*3/UL
PLATELETS.RETICULATED NFR BLD AUTO: 5.7 % (ref 0.9–11.2)
PMV BLD AUTO: 11.1 FL (ref 7.4–10.4)
POTASSIUM SERPL-SCNC: 3.9 MMOL/L (ref 3.5–5.1)
PROT SERPL-MCNC: 5.9 GM/DL (ref 5.8–7.6)
PROT UR QL STRIP.AUTO: ABNORMAL
RBC # BLD AUTO: 4.52 X10(6)/MCL (ref 4.7–6.1)
RBC #/AREA URNS AUTO: ABNORMAL /HPF
RBC MORPH BLD: NORMAL
RBC UR QL AUTO: ABNORMAL
SODIUM SERPL-SCNC: 141 MMOL/L (ref 132–146)
SP GR UR STRIP.AUTO: 1.02 (ref 1–1.03)
SQUAMOUS #/AREA URNS LPF: ABNORMAL /HPF
UROBILINOGEN UR STRIP-ACNC: NORMAL
WBC # SPEC AUTO: 9.49 X10(3)/MCL (ref 4.5–11.5)
WBC #/AREA URNS AUTO: ABNORMAL /HPF

## 2024-01-05 PROCEDURE — 63600175 PHARM REV CODE 636 W HCPCS: Performed by: NURSE PRACTITIONER

## 2024-01-05 PROCEDURE — 27000207 HC ISOLATION

## 2024-01-05 PROCEDURE — 20000000 HC ICU ROOM

## 2024-01-05 PROCEDURE — 25000003 PHARM REV CODE 250: Performed by: INTERNAL MEDICINE

## 2024-01-05 PROCEDURE — 85027 COMPLETE CBC AUTOMATED: CPT | Performed by: NURSE PRACTITIONER

## 2024-01-05 PROCEDURE — 27100171 HC OXYGEN HIGH FLOW UP TO 24 HOURS

## 2024-01-05 PROCEDURE — 25000003 PHARM REV CODE 250: Performed by: NURSE PRACTITIONER

## 2024-01-05 PROCEDURE — 87077 CULTURE AEROBIC IDENTIFY: CPT | Performed by: NURSE PRACTITIONER

## 2024-01-05 PROCEDURE — 0T2BX0Z CHANGE DRAINAGE DEVICE IN BLADDER, EXTERNAL APPROACH: ICD-10-PCS | Performed by: NURSE PRACTITIONER

## 2024-01-05 PROCEDURE — 94761 N-INVAS EAR/PLS OXIMETRY MLT: CPT

## 2024-01-05 PROCEDURE — 93005 ELECTROCARDIOGRAM TRACING: CPT

## 2024-01-05 PROCEDURE — 85730 THROMBOPLASTIN TIME PARTIAL: CPT | Performed by: NURSE PRACTITIONER

## 2024-01-05 PROCEDURE — 80053 COMPREHEN METABOLIC PANEL: CPT | Performed by: INTERNAL MEDICINE

## 2024-01-05 PROCEDURE — 93010 ELECTROCARDIOGRAM REPORT: CPT | Mod: ,,, | Performed by: INTERNAL MEDICINE

## 2024-01-05 PROCEDURE — 25000003 PHARM REV CODE 250: Performed by: STUDENT IN AN ORGANIZED HEALTH CARE EDUCATION/TRAINING PROGRAM

## 2024-01-05 PROCEDURE — 99900035 HC TECH TIME PER 15 MIN (STAT)

## 2024-01-05 PROCEDURE — 99291 CRITICAL CARE FIRST HOUR: CPT | Mod: FS,,, | Performed by: GENERAL PRACTICE

## 2024-01-05 PROCEDURE — 99900031 HC PATIENT EDUCATION (STAT)

## 2024-01-05 PROCEDURE — 94660 CPAP INITIATION&MGMT: CPT

## 2024-01-05 PROCEDURE — 63600175 PHARM REV CODE 636 W HCPCS: Performed by: STUDENT IN AN ORGANIZED HEALTH CARE EDUCATION/TRAINING PROGRAM

## 2024-01-05 PROCEDURE — 81001 URINALYSIS AUTO W/SCOPE: CPT | Performed by: NURSE PRACTITIONER

## 2024-01-05 PROCEDURE — 63600175 PHARM REV CODE 636 W HCPCS: Performed by: INTERNAL MEDICINE

## 2024-01-05 PROCEDURE — A4216 STERILE WATER/SALINE, 10 ML: HCPCS | Performed by: INTERNAL MEDICINE

## 2024-01-05 PROCEDURE — 63600175 PHARM REV CODE 636 W HCPCS: Mod: JZ,JG | Performed by: STUDENT IN AN ORGANIZED HEALTH CARE EDUCATION/TRAINING PROGRAM

## 2024-01-05 PROCEDURE — 87086 URINE CULTURE/COLONY COUNT: CPT | Performed by: NURSE PRACTITIONER

## 2024-01-05 RX ORDER — FAMOTIDINE 10 MG/ML
20 INJECTION INTRAVENOUS DAILY
Status: DISCONTINUED | OUTPATIENT
Start: 2024-01-05 | End: 2024-01-11 | Stop reason: HOSPADM

## 2024-01-05 RX ORDER — AMIODARONE HYDROCHLORIDE 200 MG/1
200 TABLET ORAL 2 TIMES DAILY
Status: COMPLETED | OUTPATIENT
Start: 2024-01-08 | End: 2024-01-11

## 2024-01-05 RX ORDER — AMIODARONE HYDROCHLORIDE 200 MG/1
200 TABLET ORAL DAILY
Status: DISCONTINUED | OUTPATIENT
Start: 2024-01-11 | End: 2024-01-11 | Stop reason: HOSPADM

## 2024-01-05 RX ORDER — AMIODARONE HYDROCHLORIDE 200 MG/1
400 TABLET ORAL 2 TIMES DAILY
Status: COMPLETED | OUTPATIENT
Start: 2024-01-05 | End: 2024-01-08

## 2024-01-05 RX ADMIN — MEROPENEM 1 G: 1 INJECTION, POWDER, FOR SOLUTION INTRAVENOUS at 01:01

## 2024-01-05 RX ADMIN — SODIUM CHLORIDE, PRESERVATIVE FREE 10 ML: 5 INJECTION INTRAVENOUS at 11:01

## 2024-01-05 RX ADMIN — SODIUM CHLORIDE, PRESERVATIVE FREE 10 ML: 5 INJECTION INTRAVENOUS at 05:01

## 2024-01-05 RX ADMIN — MUPIROCIN: 20 OINTMENT TOPICAL at 08:01

## 2024-01-05 RX ADMIN — CEFTRIAXONE SODIUM 2 G: 2 INJECTION, POWDER, FOR SOLUTION INTRAMUSCULAR; INTRAVENOUS at 11:01

## 2024-01-05 RX ADMIN — MEROPENEM 1 G: 1 INJECTION, POWDER, FOR SOLUTION INTRAVENOUS at 04:01

## 2024-01-05 RX ADMIN — AMIODARONE HYDROCHLORIDE 400 MG: 200 TABLET ORAL at 04:01

## 2024-01-05 RX ADMIN — SODIUM CHLORIDE, PRESERVATIVE FREE 10 ML: 5 INJECTION INTRAVENOUS at 04:01

## 2024-01-05 RX ADMIN — MORPHINE SULFATE 1 MG: 4 INJECTION, SOLUTION INTRAMUSCULAR; INTRAVENOUS at 12:01

## 2024-01-05 RX ADMIN — NOREPINEPHRINE BITARTRATE 0.06 MCG/KG/MIN: 8 INJECTION, SOLUTION INTRAVENOUS at 08:01

## 2024-01-05 RX ADMIN — AMIODARONE HYDROCHLORIDE 0.5 MG/MIN: 1.8 INJECTION, SOLUTION INTRAVENOUS at 05:01

## 2024-01-05 RX ADMIN — FAMOTIDINE 20 MG: 10 INJECTION, SOLUTION INTRAVENOUS at 11:01

## 2024-01-05 RX ADMIN — SODIUM CHLORIDE, PRESERVATIVE FREE 10 ML: 5 INJECTION INTRAVENOUS at 12:01

## 2024-01-05 NOTE — PROCEDURES
"Francis Montero is a 89 y.o. male patient.    Temp: 98.1 °F (36.7 °C) (01/05/24 1600)  Pulse: 76 (01/05/24 1630)  Resp: (!) 22 (01/05/24 1630)  BP: 132/64 (01/05/24 1630)  SpO2: 96 % (01/05/24 1630)  Weight: 72.5 kg (159 lb 13.3 oz) (01/02/24 2220)  Height: 5' 5" (165.1 cm) (01/03/24 1221)       Bladder Cath    Date/Time: 1/5/2024 5:21 PM  Location procedure was performed: [facility] UROLOGY    Performed by: Arianna Ornelas AGACNP-BC  Authorized by: Arianna Ornelas AGACNP-BC  Pre-operative diagnosis: urinary retention, sepsis  Post-operative diagnosis: urinary retention, sepsis  Indications: catheter change  Local anesthesia used: no    Anesthesia:  Local anesthesia used: no    Patient sedated: no  Preparation: Patient was prepped and draped in the usual sterile fashion.  Catheter insertion: suprapubic.  Catheter type: Graff  Catheter size: 24 Fr (5cc)  Complicated insertion: no  Altered anatomy: no  Bladder irrigation: no  Number of attempts: 1  Urine characteristics: cloudy and yellow  Patient tolerance: Patient tolerated the procedure well with no immediate complications          1/5/2024    "

## 2024-01-05 NOTE — PROGRESS NOTES
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  Patient was originally started on IV antibiotics however during his stay began to become progressively hypotensive.  Patient received 3 L fluid boluses which did not improve patient's blood pressure to a map greater than 65 at which point patient was initiated on Levophed for hemodynamic support.  Of note, patient is visiting [place] and reports that he has been feeling somewhat ill the last 2 days.  Admits to dysuria, fatigue, myalgia, shortness a breath.  Reports no chest pain, palpitations, fevers, chills, nausea, vomiting.  ICU was consulted for upgrade.    Hospital Course/Significant events:  01/01/2024:  Admitted to [facility]  01/02/2024:  Upgraded to ICU, initiated on Levophed    24 Hour Interval History:  No acute events occurred overnight. Vital signs remained stable. Family elected to change pt's code status to chemical code only. PICC line was placed. Cardio cleared pt to resume Eliquis if no need for thoracentesis. Urology has low suspicion that sepsis is from his catheter. Awaiting CT scan of abdomen to r/o abscess. Blood cultures grew Strep pneumo 2/2,sensitive to gent, meropenem and zosyn. Labs appear stable at this time with mildly improved kidney function, elevated PT and INR.     Past Medical History:   Diagnosis Date    CKD (chronic kidney  disease)     COPD (chronic obstructive pulmonary disease)     HLD (hyperlipidemia)     HTN (hypertension)     Merkel cell carcinoma     Paroxysmal A-fib     Prostate cancer     PUD (peptic ulcer disease)      Past Surgical History:   Procedure Laterality Date    INGUINAL HERNIA REPAIR      PROSTATE SURGERY      ROTATOR CUFF REPAIR       Social History     Socioeconomic History    Marital status:      Social Determinants of Health     Housing Stability: Unknown (1/3/2024)    Housing Stability Vital Sign     Unable to Pay for Housing in the Last Year: No     Current Outpatient Medications   Medication Instructions    atorvastatin (LIPITOR) 10 mg, Oral, Daily    calcitRIOL (ROCALTROL) 0.25 mcg, Oral, Daily, Takes every other day    gabapentin (NEURONTIN) 300 mg, Oral, Nightly    metoprolol tartrate (LOPRESSOR) 25 mg, Oral, 2 times daily    NON FORMULARY MEDICATION 850 mg, Oral, Daily, Umary     Current Inpatient Medications   atorvastatin  10 mg Oral Daily    calcitRIOL  0.25 mcg Oral Daily    gabapentin  300 mg Oral QHS    meropenem (MERREM) IVPB  1 g Intravenous Q12H    mupirocin   Nasal BID    sodium chloride 0.9%  10 mL Intravenous Q6H    vancomycin (VANCOCIN) IV (PEDS and ADULTS)  15 mg/kg Intravenous Q24H     Current Intravenous Infusions   sodium chloride 0.9% Stopped (01/03/24 1223)    albuterol 0.083% 10 mg/hr (01/02/24 1256)    amiodarone in dextrose 5% 0.5 mg/min (01/05/24 0438)    heparin (porcine) in D5W 12 Units/kg/hr (01/05/24 0438)    NORepinephrine bitartrate-D5W 0.07 mcg/kg/min (01/05/24 0438)       Review of Systems   Constitutional:  Positive for chills and fever. Negative for weight loss.   HENT:  Negative for congestion and sinus pain.    Eyes:  Negative for blurred vision.   Respiratory:  Negative for cough, hemoptysis and wheezing.    Cardiovascular:  Negative for chest pain and palpitations.   Gastrointestinal:  Negative for abdominal pain, nausea and vomiting.   Genitourinary:  Positive  for dysuria and urgency. Negative for frequency and hematuria.   Musculoskeletal:  Positive for back pain and neck pain. Negative for myalgias.   Skin:  Negative for itching and rash.   Neurological:  Negative for dizziness, tingling, sensory change and headaches.      Objective:       Intake/Output Summary (Last 24 hours) at 1/5/2024 0453  Last data filed at 1/5/2024 0438  Gross per 24 hour   Intake 1401.55 ml   Output 1120 ml   Net 281.55 ml     Vital Signs (Most Recent):  Temp: 98.5 °F (36.9 °C) (01/05/24 0415)  Pulse: 102 (01/05/24 0415)  Resp: (!) 25 (01/05/24 0415)  BP: 119/64 (01/05/24 0415)  SpO2: (!) 88 % (01/05/24 0415)  Body mass index is 26.6 kg/m².  Weight: 72.5 kg (159 lb 13.3 oz) Vital Signs (24h Range):  Temp:  [98.4 °F (36.9 °C)-98.9 °F (37.2 °C)] 98.5 °F (36.9 °C)  Pulse:  [] 102  Resp:  [15-45] 25  SpO2:  [88 %-100 %] 88 %  BP: ()/(46-93) 119/64  Arterial Line BP: ()/(12-93) 85/56     Physical Exam  Constitutional:       General: He is not in acute distress.     Appearance: He is ill-appearing.      Comments: Sleeping upon exam. Oxygen mask in proper place   HENT:      Head:      Comments: BiPaP in place. PICC line placed, dressing intact.   Cardiovascular:      Rate and Rhythm: Tachycardia present.      Pulses: Normal pulses.      Heart sounds: Murmur heard.      No friction rub. No gallop.      Comments: Systolic murmur noted  Pulmonary:      Effort: Pulmonary effort is normal.      Breath sounds: No stridor. Rhonchi and rales present. No wheezing.   Abdominal:      General: Bowel sounds are normal.      Palpations: Abdomen is soft.      Tenderness: There is no abdominal tenderness. There is no guarding.   Musculoskeletal:         General: No swelling.      Right lower leg: No edema.      Left lower leg: No edema.   Neurological:      General: No focal deficit present.      Mental Status: He is alert and oriented to person, place, and time.       Lines/Drains/Airways        Peripherally Inserted Central Catheter Line  Duration             PICC Triple Lumen 01/04/24 1725 left basilic <1 day              Drain  Duration                  Suprapubic Catheter 16 Fr. -- days              Arterial Line  Duration             Arterial Line 01/03/24 1630 Right Radial 1 day              Peripheral Intravenous Line  Duration                  Peripheral IV - Single Lumen 01/02/24 1006 18 G Right Antecubital 2 days         Peripheral IV - Single Lumen 01/03/24 0441 20 G Anterior;Right Upper Arm 2 days         Peripheral IV - Single Lumen 01/04/24 1459 20 G Anterior;Left;Proximal Forearm <1 day                    Significant Labs:  Lab Results   Component Value Date    WBC 9.49 01/05/2024    WBC 9.49 01/05/2024    HGB 12.0 (L) 01/05/2024    HCT 37.3 (L) 01/05/2024    MCV 82.5 01/05/2024    PLT 99 (L) 01/05/2024     BMP  Lab Results   Component Value Date     01/05/2024    K 3.9 01/05/2024    CHLORIDE 110 01/05/2024    CO2 19 (L) 01/05/2024    BUN 86.3 (H) 01/05/2024    CREATININE 2.63 (H) 01/05/2024    CALCIUM 9.6 01/05/2024     ABG  Recent Labs   Lab 01/03/24  0735   PH 7.400   PO2 60.0*   PCO2 30.0*   HCO3 18.6*   POCBASEDEF -5.10     Mechanical Ventilation Support:  Oxygen Concentration (%): 50 (01/05/24 0407)    Significant Imaging:  Imaging Results              X-Ray Chest AP Portable (Final result)  Result time 01/02/24 21:00:06      Final result by Darrius Mcpherson MD (01/02/24 21:00:06)                   Impression:      Changes most consistent with pulmonary edema and a large right-sided pleural effusion.  This appears grossly stable from the previous exam      Electronically signed by: Darrius Mcpherson MD  Date:    01/02/2024  Time:    21:00               Narrative:    EXAMINATION:  XR CHEST AP PORTABLE    CLINICAL HISTORY:  Shortness of breath    TECHNIQUE:  Single frontal view of the chest was performed.    COMPARISON:  01/02/2024    FINDINGS:  There is significant opacification of the  right hemithorax cannot rule out a large effusion.  There are calcified pleural plaques suggestive of previous X best is exposure.  There are increased markings bilaterally most consistent with pulmonary edema                                       US Retroperitoneal Complete (Final result)  Result time 01/02/24 15:46:53      Final result by Soren Miranda MD (01/02/24 15:46:53)                   Impression:      1. No hydronephrosis.  2. Medical renal disease.      Electronically signed by: Soren Miranda  Date:    01/02/2024  Time:    15:46               Narrative:    EXAMINATION:  US RETROPERITONEAL COMPLETE    CLINICAL HISTORY:  elevated BUN/Creat;    COMPARISON:  None.    FINDINGS:  Grayscale and color Doppler sonographic evaluation of the kidneys and urinary bladder.    The right kidney measures 8 cm. The left kidney measures 8 cm.   No hydronephrosis.  Heterogeneous renal parenchymal echogenicity with loss of corticomedullary differentiation.    Bladder is not visualized.                                       CT Head Without Contrast (Final result)  Result time 01/02/24 15:12:29      Final result by Alejandrina Rodriguez MD (01/02/24 15:12:29)                   Impression:      1. No acute intracranial abnormality.  2. Chronic microvascular ischemic changes.      Electronically signed by: Alejandrina Rodriguez  Date:    01/02/2024  Time:    15:12               Narrative:    EXAMINATION:  CT HEAD WITHOUT CONTRAST    CLINICAL HISTORY:  Mental status change, unknown cause;    TECHNIQUE:  Axial scans were obtained from skull base to the vertex.    Coronal and sagittal reconstructions obtained from the axial data.    Automatic exposure control was utilized to limit radiation dose.    Contrast: None    Radiation Dose:    Total DLP: 1029 mGy*cm    COMPARISON:  None    FINDINGS:  There is no acute intracranial hemorrhage or edema.  There is a small area of encephalomalacia in the left cerebellum.    There is no mass effect  or midline shift.  There is diffuse parenchymal volume loss.  The basal cisterns are patent. There is no abnormal extra-axial fluid collection.  Carotid artery calcifications are noted.    The calvarium and skull base are intact.  There are bilateral mastoid effusions, right greater than left.                                       X-Ray Chest 1 View (Final result)  Result time 01/02/24 10:38:49      Final result by Eligio Shaikh MD (01/02/24 10:38:49)                   Impression:      Changes suggestive of pulmonary vascular congestion and cardiac decompensation.    Slightly more confluent opacities at the right base infiltrate cannot be completely excluded.    Right-sided pleural effusion      Electronically signed by: Eligio Shaikh  Date:    01/02/2024  Time:    10:38               Narrative:    EXAMINATION:  XR CHEST 1 VIEW    CPT 43207    CLINICAL HISTORY:  SOB;    FINDINGS:  Examination reveals mediastinal silhouette to be within normal limits cardiac silhouette is not enlarged there is increase in interstitial and pulmonary vascular markings indicating some degree of pulmonary vascular congestion and cardiac decompensation.    Slightly more confluent opacities identified at the right base superimposed infiltrate can not be completely excluded.    There is blunting of the right costophrenic angle representing a right-sided pleural effusion    No other focal consolidative changes                                   I have reviewed the pertinent imaging within the past 24 hours.    Assessment/Plan:     Assessment  Septic Shock  Acute Cystitis without hematuria  - hx of urethral stent  Community Acquired Pneumonia  Bacteremia (gram + cocci)  Acute Hypoxic on chronic hypercarbic respiratory failure  - hx of COPD  - hx of asbestosis, pulmonary fibrosis?  Right pleural effusion  Acute renal failure  - hx of CKD with unknown baseline  Metabolic Acidosis  - lactic acidosis, respiratory  compensation  NSTEMI  HFpEF, decompensated  Hyperkalemia  Hypomagnesemia  Paroxysmal Afib  Hx of prostate cancer  Hx of merkel cell carcinoma      Plan  - Continue critical care level management at this time.   - continue levophed for hemodynamic support, titrate for goal map of 65  -cardiology following, appreciate their assistance; cleared pt to resume Eliquis if no need for thoracentesis.  -TTE with EF of 60% with diastolic failure  -Trials of Lasix given  -urology consulted, appreciate their assistance; low suspicion that sepsis is from his catheter. Awaiting CT scan of abdomen to r/o abscess.   -pending repeat laboratory evaluation  -broaden antibiotic coverage to vancomycin(day #2); Started on Merrem 1/5/24  -BC + Strep Pneumo 2/2; sensitive to gent, meropenem and zosyn.   -continue with supplement oxygen, can titrate for goal SpO2 88-92%   -p.r.n.  Nebulized treatments for wheezing  -continue with strict I's and O's  -patient's family discussing potential [place], we will discuss with case management in the morning if this is possible      CODE STATUS: Partial Code   Consults: Urology, Nephrology, Cardiology  Access: Peripheral  Drains: suprapubic catheter  Diet: Diet NPO  Fluids: none  Pressors: Levophed   Oxygenation: oxymask at 12L  DVT Prophylaxis: Heparin  GI Prophylaxis: none  I&Os: pending 01/04 0701 - 01/05 0700  In: 1250.4 [I.V.:750.3]  Out: 970 [Urine:970]        32 minutes of critical care was time spent personally by me on the following activities: development of treatment plan with patient or surrogate and bedside caregivers, discussions with consultants, evaluation of patient's response to treatment, examination of patient, ordering and performing treatments and interventions, ordering and review of laboratory studies, ordering and review of radiographic studies, pulse oximetry, re-evaluation of patient's condition.  This critical care time did  not overlap with that of any other provider or involve time for any procedures.     Alexander Andrade MD  Pulmonary Critical Care Medicine  [facility] - Emergency Dept  DOS: 01/05/2024

## 2024-01-05 NOTE — CONSULTS
[facility]  Infectious Diseases Consult        ASSESSMENT & PLAN:     Information for HPI gathered through chart review given patient's clinical condition.  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  Subsequently found to have Streptococcus pneumoniae bacteremia.  Chest x-ray concerning for right-sided pneumonia with associated pleural effusion as well as cardiac decompensation.  Patient became progressively more hypotensive and hypoxic and was transferred to the ICU for vasopressor support and BiPAP.  Family has now made him a chemical code only.  He was evaluated by Urology given some concern for urinary source sepsis, however felt to be unlikely per their note.  CT abdomen and pelvis has been ordered for further evaluation, however patient currently too unstable.  Renal function has been stable.  Urine culture is positive for ESBL Klebsiella.  Patient is currently receiving meropenem and vancomycin.  TTE without concern for endocarditis.  We have been consulted per [employer] policy for the use of meropenem.      Streptococcus pneumoniae bacteremia s/t the following  CAP   Right pleural effusion - ?empyema  Septic shock  Acute hypoxemic respiratory failure  Positive UCx - suspect ASB  H/o prostate CA / ureteral stent   H/o pulmonary fibrosis secondary to asbestos / COPD / CKD / AFib      PLAN:  Suspect pulmonary source of sepsis.  Needs CT chest, currently too unstable.    F/u CT A/P however agree with urology that positive UCx is s/t ASB / colonization.  Change abx to ceftriaxone 2g IV q24h.  Repeat BCx.   TTE negative.  No indication for ASHLY at present.   Noted patient is a chemical code only.  Remainder of care per ICU.  Discussed with family, nursing, and   Pablo.      HISTORY OF PRESENT ILLNESS:     Information for HPI gathered through chart review given patient's clinical condition.  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  Subsequently found to have Streptococcus pneumoniae bacteremia.  Chest x-ray concerning for right-sided pneumonia with associated pleural effusion as well as cardiac decompensation.  Patient became progressively more hypotensive and hypoxic and was transferred to the ICU for vasopressor support and BiPAP.  Family has now made him a chemical code only.  He was evaluated by Urology given some concern for urinary source sepsis, however felt to be unlikely per their note.  CT abdomen and pelvis has been ordered for further evaluation, however patient currently too unstable.  Renal function has been stable.  Urine culture is positive for ESBL Klebsiella.  Patient is currently receiving meropenem and vancomycin.  TTE without concern for endocarditis.  We have been consulted per [employer] policy for the use of meropenem.      PAST MEDICAL HISTORY:     Past Medical History:   Diagnosis Date    CKD (chronic kidney disease)     COPD (chronic obstructive pulmonary disease)     HLD (hyperlipidemia)     HTN (hypertension)     Merkel cell carcinoma     Paroxysmal A-fib     Prostate cancer     PUD (peptic ulcer disease)        PAST SURGICAL HISTORY:     Past Surgical History:   Procedure Laterality Date    INGUINAL HERNIA REPAIR      PROSTATE SURGERY      ROTATOR CUFF REPAIR         ALLERGIES:   Patient has no known allergies.    FAMILY HISTORY:   Reviewed and non-contributory     SOCIAL HISTORY:     Social History     Tobacco Use    Smoking status: Not on file    Smokeless tobacco: Not on file   Substance Use  "Topics    Alcohol use: Not on file        MEDICATIONS:   Reviewed in EMR    REVIEW OF SYSTEMS:   Except as documented, all other systems reviewed and negative     PHYSICAL EXAM:   T 98.5 °F (36.9 °C)   BP (!) 141/103   P 110   RR (!) 23   O2 (!) 91 %  GENERAL: Elderly male on CPAP; fatigued; NAD; does not appear toxic  SKIN: no rash  HEENT: sclera non-icteric; PERRL; CPAP in place  NECK: supple; no LAD  CHEST: Some scattered rhonchi, decreased some on R; nonlabored, equal expansion  CARDIOVASCULAR: RRR, S1S2; no murmur   ABDOMEN:  active bowel sounds; abdomen soft, nondistended, nontender to palpation  GENITOURINARY: no suprapubic tenderness; no CVA tenderness; Graff in place  EXTREMITIES: no cyanosis or clubbing  NEURO: AAO x4; CN II-XII grossly intact  PSYCH: Mentation and affect appropriate     LABS AND IMAGING:     Recent Labs     01/04/24 0108 01/05/24  0124   WBC 10.33  10.33 9.49  9.49   RBC 4.27* 4.52*   HGB 11.5* 12.0*   HCT 35.1* 37.3*   MCV 82.2 82.5   MCH 26.9* 26.5*   MCHC 32.8* 32.2*   RDW 15.9 15.8    99*     Recent Labs     01/02/24  1358 01/02/24  1852 01/02/24  2234   LACTIC 4.1* 3.6* 2.9*     Recent Labs     01/03/24  1120   INR 1.7*     No results for input(s): "HGBA1C", "CHOL", "TRIG", "LDL", "VLDL", "HDL" in the last 72 hours.   Recent Labs     01/02/24  1635 01/02/24  2234 01/03/24  0218 01/04/24  0108 01/05/24  0124    143 141 138 141   K 5.5* 4.9 5.1 4.3 3.9   CHLORIDE 114* 115* 112* 109 110   CO2 12* 14* 16* 17* 19*   BUN 80.4* 80.9* 80.2* 81.6* 86.3*   CREATININE 2.83* 2.65* 2.59* 2.66* 2.63*   GLUCOSE 113 73* 106 159* 114   CALCIUM 8.1* 8.5* 8.7* 8.5* 9.6   MG 2.00  --  2.00  --   --    PHOS 3.4 3.6 3.8  --   --    ALBUMIN 2.2* 2.2* 2.3* 2.0* 1.8*   GLOBULIN 3.0  --  3.8* 3.1 4.1*   ALKPHOS 34*  --  47 70 80   ALT 20  --  21 18 15   AST 37*  --  47* 39* 34   BILITOT 0.4  --  0.4 0.4 0.4     Recent Labs     01/02/24  1635 01/02/24  2234   TROPONINI 0.251* 0.290*      "     X-Ray Chest 1 View  Narrative: EXAMINATION:  XR CHEST 1 VIEW    CLINICAL HISTORY:  picc;    COMPARISON:  Yesterday    FINDINGS:  Frontal view of the chest was obtained. Interval placement of left PICC with tip overlying the distal SVC.  The heart is not significantly enlarged.  Similar appearance of the lungs with bilateral interstitial predominant opacities with more dense opacities right mid to lower lung.  No pneumothorax.  Impression: Left PICC tip overlies the distal SVC.    Electronically signed by: Soren Miranda  Date:    01/04/2024  Time:    17:59       NADIR Tony  Infectious Diseases

## 2024-01-05 NOTE — NURSING
Nurses Note -- 4 Eyes      1/5/2024   1:57 PM      Skin assessed during: Daily Assessment      [x] No Altered Skin Integrity Present    [x]Prevention Measures Documented      [] Yes- Altered Skin Integrity Present or Discovered   [] LDA Added if Not in Epic (Describe Wound)   [] New Altered Skin Integrity was Present on Admit and Documented in LDA   [] Wound Image Taken    Wound Care Consulted? No    Attending Nurse:  Je Chua RN/Staff Member:   RADHA Cazares

## 2024-01-05 NOTE — PROGRESS NOTES
Pharmacist Renal Dose Adjustment Note    Francis Montero is a 89 y.o. male being treated with the medication meropenem     Patient Data:    Vital Signs (Most Recent):  Temp: 98.1 °F (36.7 °C) (01/05/24 1200)  Pulse: 63 (01/05/24 1400)  Resp: 19 (01/05/24 1400)  BP: 124/60 (01/05/24 1400)  SpO2: 97 % (01/05/24 1400) Vital Signs (72h Range):  Temp:  [98.1 °F (36.7 °C)-100 °F (37.8 °C)]   Pulse:  []   Resp:  [15-56]   BP: ()/()   SpO2:  [84 %-100 %]   Arterial Line BP: (-)/(-)      Recent Labs   Lab 01/03/24  0218 01/04/24  0108 01/05/24  0124   CREATININE 2.59* 2.66* 2.63*     Serum creatinine: 2.63 mg/dL (H) 01/05/24 0124  Estimated creatinine clearance: 14.9 mL/min (A)    Medication:meropenem dose: 1g frequency q8h will be changed to medication:meropenem dose: 1g frequency:q12h    Pharmacist's Name: Heike Shah  Pharmacist's Extension: [phone]

## 2024-01-05 NOTE — PROGRESS NOTES
[facility] - [facility]    Cardiology  Progress Note    Patient Name: Francis Montero  MRN: [id number]  Admission Date: 1/2/2024  Hospital Length of Stay: 3 days  Code Status: Partial Code   Attending Physician: Bernardino Ordoñez MD   Primary Care Physician: Lisa, Primary Doctor  Expected Discharge Date:   Principal Problem:SOB (shortness of breath)    Subjective:   Consultation Reason: Abnormal Troponin & AF RVR     HPI:   Mr. Montero is a 89 y/o male,  unknown to [facility] (Followed by Dr. Major in [place]), who presented to Ed with c/o SOB. Admit VS- BP 70/30, P RA sat 85%. He was given 200ml NS enroute. In Ed he was given another 1.5L NS bolus with improvement in BP. Lab significant for WBC 13.89, K+ 5.7, CO2 16, BUN/creatinine 83.4/3.10, Mg 1.50, BNP 1970, troponin 0.236, Venous lactate 3.3. EKG SR with PACs. CXR consistent with pulmonary congestion and a large bilateral pleural effusions. Echo revealing EF 60-65% with mild to  moderate AS, mild MS and mild to moderate TR. Blood and urine cultures obtained and patient started on antibiotics. [facility] has been consulted for elevated troponin    Hospital Course:  1.3.24: NAD Noted. On BIPAP. He is awake but confused, requiring Mittens. AF RVR. On Amiodarone Infusion, He has a history of AF. Hypotensive Requiring Levophed Support. Legs are Warm.    1.4.24: BIPAP Support. Awake. AF RVR. Requiring Levophed Support. On Amiodarone Infusion. On Heparin Infusion for Stroke Risk Reduction Related to AF.   1.5.24: BIPAP. AF RVR. Remains on Levophed. Septic status noted. Family at bedside. Thrombocytopenia noted, Will discontinue Heparin. Also will discontinue Amiodarone Infusion.      PMH: Afib/Eliquis, COPD, asbestosis, Merkel cell carcinoma of left ear anxiety, esophagitis, esophageal varices, hiatal hernia, merkel cell carcinoma, prostate cancer, chronic UTIs, HLD, PUD  PSH: surgical resection with adjuvant radiotherapy to Left ear,  EGD, prostate surgery,  open  shoulder rotator cuff repair  Family History:   Social History: former  smoker 40 pack years,      Previous Cardiac Diagnostics:   Echocardiogram (1.2.24):  Left Ventricle: The left ventricle is normal in size. Normal wall thickness. Normal wall motion. There is normal systolic function with a visually estimated ejection fraction of 60 - 65%. Diastolic function cannot be reliably determined in the presence of mitral valve disease. Inconclusive left ventricular filling pressure.  Right Ventricle: Normal right ventricular cavity size. Systolic function is normal.  Aortic Valve: There is mild to moderate stenosis. Aortic valve area by VTI is 1.23 cm². Aortic valve peak velocity is 2.63 m/s. Mean gradient is 18 mmHg. The dimensionless index is 0.39. There is trace aortic regurgitation.  Mitral Valve: There is severe mitral annular calcification present. There is mild stenosis. The mean pressure gradient across the mitral valve is 6 mmHg at a heart rate of  bpm.  Tricuspid Valve: There is mild to moderate regurgitation. The estimated PA systolic pressure is at least 38 mmHg.     Echocardiogram (2.22.22):  Left Ventricle: Left ventricle is normal in size and function. Normal   wall thickness.   Right Ventricle: Right ventricle is normal in size and function. Normal   wall thickness.   Mitral Valve: Mitral valve is normal in structure and function. Mildly   calcified leaflets.   Tricuspid Valve: Tricuspid valve is normal size and function.   Aortic Valve: Aortic valve is normal in structure and function.   Moderately calcified cusps.      Review of patient's allergies indicates:  No Known Allergies    Review of Systems   Unable to perform ROS: Acuity of condition     Objective:     Vital Signs (Most Recent):  Temp: 98.1 °F (36.7 °C) (01/05/24 1200)  Pulse: 63 (01/05/24 1400)  Resp: 19 (01/05/24 1400)  BP: 124/60 (01/05/24 1400)  SpO2: 97 % (01/05/24 1400) Vital Signs (24h Range):  Temp:  [98.1 °F (36.7 °C)-98.7 °F (37.1  °C)] 98.1 °F (36.7 °C)  Pulse:  [] 63  Resp:  [15-44] 19  SpO2:  [88 %-100 %] 97 %  BP: ()/() 124/60  Arterial Line BP: (-)/(-) 101/51     Weight: 72.5 kg (159 lb 13.3 oz)  Body mass index is 26.6 kg/m².    SpO2: 97 %         Intake/Output Summary (Last 24 hours) at 1/5/2024 1408  Last data filed at 1/5/2024 1358  Gross per 24 hour   Intake 1281.28 ml   Output 850 ml   Net 431.28 ml         Lines/Drains/Airways       Peripherally Inserted Central Catheter Line  Duration             PICC Triple Lumen 01/04/24 1725 left basilic <1 day              Drain  Duration                  Suprapubic Catheter 16 Fr. -- days              Arterial Line  Duration             Arterial Line 01/03/24 1630 Right Radial 1 day              Peripheral Intravenous Line  Duration                  Peripheral IV - Single Lumen 01/02/24 1006 18 G Right Antecubital 3 days         Peripheral IV - Single Lumen 01/03/24 0441 20 G Anterior;Right Upper Arm 2 days         Peripheral IV - Single Lumen 01/04/24 1459 20 G Anterior;Left;Proximal Forearm <1 day                  Significant Labs:   Recent Results (from the past 72 hour(s))   COVID/RSV/FLU A&B PCR    Collection Time: 01/02/24  2:13 PM   Result Value Ref Range    Influenza A PCR Not Detected Not Detected    Influenza B PCR Not Detected Not Detected    Respiratory Syncytial Virus PCR Not Detected Not Detected    SARS-CoV-2 PCR Not Detected Not Detected, Negative   Troponin I    Collection Time: 01/02/24  4:35 PM   Result Value Ref Range    Troponin-I 0.251 (H) 0.000 - 0.045 ng/mL   Comprehensive Metabolic Panel    Collection Time: 01/02/24  4:35 PM   Result Value Ref Range    Sodium Level 141 132 - 146 mmol/L    Potassium Level 5.5 (H) 3.5 - 5.1 mmol/L    Chloride 114 (H) 98 - 111 mmol/L    Carbon Dioxide 12 (L) 23 - 31 mmol/L    Glucose Level 113 75 - 121 mg/dL    Blood Urea Nitrogen 80.4 (H) 8.4 - 25.7 mg/dL    Creatinine 2.83 (H) 0.73 - 1.18 mg/dL    Calcium  Level Total 8.1 (L) 8.8 - 10.0 mg/dL    Protein Total 5.2 (L) 5.8 - 7.6 gm/dL    Albumin Level 2.2 (L) 3.4 - 4.8 g/dL    Globulin 3.0 2.4 - 3.5 gm/dL    Albumin/Globulin Ratio 0.7 (L) 1.1 - 2.0 ratio    Bilirubin Total 0.4 <=1.5 mg/dL    Alkaline Phosphatase 34 (L) 40 - 150 unit/L    Alanine Aminotransferase 20 0 - 55 unit/L    Aspartate Aminotransferase 37 (H) 5 - 34 unit/L    eGFR 20 mls/min/1.73/m2   Magnesium    Collection Time: 01/02/24  4:35 PM   Result Value Ref Range    Magnesium Level 2.00 1.60 - 2.60 mg/dL   Phosphorus    Collection Time: 01/02/24  4:35 PM   Result Value Ref Range    Phosphorus Level 3.4 2.3 - 4.7 mg/dL   CBC with Differential    Collection Time: 01/02/24  4:37 PM   Result Value Ref Range    WBC 7.77 4.50 - 11.50 x10(3)/mcL    RBC 4.04 (L) 4.70 - 6.10 x10(6)/mcL    Hgb 11.0 (L) 14.0 - 18.0 g/dL    Hct 36.0 (L) 42.0 - 52.0 %    MCV 89.1 80.0 - 94.0 fL    MCH 27.2 27.0 - 31.0 pg    MCHC 30.6 (L) 33.0 - 36.0 g/dL    RDW 16.1 11.5 - 17.0 %    Platelet 167 130 - 400 x10(3)/mcL    MPV 11.4 (H) 7.4 - 10.4 fL    NRBC% 0.5 %   Manual Differential    Collection Time: 01/02/24  4:37 PM   Result Value Ref Range    WBC 7.77 x10(3)/mcL    Neutrophils % 74 %    Lymphs % 9 %    Metamyelocytes % 16 (H) <=0 %    Myelocytes % 1 (H) <=0 %    Neutrophils Abs 5.7498 2.1 - 9.2 x10(3)/mcL    Lymphs Abs 0.6993 0.6 - 4.6 x10(3)/mcL    Platelets Normal Normal, Adequate    RBC Morph Abnormal (A) Normal    Poikilocytosis Slight (A) (none)    Jesup Cells Slight (A) (none)   Lactic Acid, Plasma    Collection Time: 01/02/24  6:52 PM   Result Value Ref Range    Lactic Acid Level 3.6 (HH) 0.5 - 2.2 mmol/L   RT Blood Gas    Collection Time: 01/02/24  8:59 PM   Result Value Ref Range    Sample Type Arterial Blood     Sample site Right Radial Artery     Drawn by RF RRT     pH, Blood gas 7.340 (L) 7.350 - 7.450    pCO2, Blood gas 32.0 (L) 35.0 - 45.0 mmHg    pO2, Blood gas 58.0 (L) 80.0 - 100.0 mmHg    Sodium, Blood Gas 136 (L)  137 - 145 mmol/L    Potassium, Blood Gas 4.9 3.5 - 5.0 mmol/L    Calcium Level Ionized 1.09 (L) 1.12 - 1.23 mmol/L    TOC2, Blood gas 18.3 mmol/L    Base Excess, Blood gas -7.60 (L) -2.00 - 2.00 mmol/L    sO2, Blood gas 87.8 %    HCO3, Blood gas 17.3 (L) 22.0 - 26.0 mmol/L    THb, Blood gas 9.7 (L) 12 - 16 g/dL    O2 Hb, Blood Gas 91.0 (L) 94.0 - 97.0 %    CO Hgb 2.7 (H) 0.5 - 1.5 %    Met Hgb 0.0 (L) 0.4 - 1.5 %    Allens Test Yes     Oxygen Device, Blood gas Face Mask     LPM 10.0    MRSA PCR    Collection Time: 01/02/24 10:07 PM   Result Value Ref Range    MRSA PCR SCRN ([facility]) Not Detected Not Detected   Troponin I    Collection Time: 01/02/24 10:34 PM   Result Value Ref Range    Troponin-I 0.290 (H) 0.000 - 0.045 ng/mL   Lactic Acid, Plasma    Collection Time: 01/02/24 10:34 PM   Result Value Ref Range    Lactic Acid Level 2.9 (H) 0.5 - 2.2 mmol/L   Renal Function Panel    Collection Time: 01/02/24 10:34 PM   Result Value Ref Range    Sodium Level 143 132 - 146 mmol/L    Potassium Level 4.9 3.5 - 5.1 mmol/L    Chloride 115 (H) 98 - 111 mmol/L    Carbon Dioxide 14 (L) 23 - 31 mmol/L    Glucose Level 73 (L) 75 - 121 mg/dL    Blood Urea Nitrogen 80.9 (H) 8.4 - 25.7 mg/dL    Creatinine 2.65 (H) 0.73 - 1.18 mg/dL    Calcium Level Total 8.5 (L) 8.8 - 10.0 mg/dL    Albumin Level 2.2 (L) 3.4 - 4.8 g/dL    Phosphorus Level 3.6 2.3 - 4.7 mg/dL    eGFR 22 mls/min/1.73/m2   Hemoglobin and Hematocrit    Collection Time: 01/02/24 10:34 PM   Result Value Ref Range    Hgb 11.5 (L) 14.0 - 18.0 g/dL    Hct 37.2 (L) 42.0 - 52.0 %   Phosphorus    Collection Time: 01/03/24  2:18 AM   Result Value Ref Range    Phosphorus Level 3.8 2.3 - 4.7 mg/dL   Magnesium    Collection Time: 01/03/24  2:18 AM   Result Value Ref Range    Magnesium Level 2.00 1.60 - 2.60 mg/dL   Comprehensive Metabolic Panel    Collection Time: 01/03/24  2:18 AM   Result Value Ref Range    Sodium Level 141 132 - 146 mmol/L    Potassium Level 5.1 3.5 - 5.1 mmol/L     Chloride 112 (H) 98 - 111 mmol/L    Carbon Dioxide 16 (L) 23 - 31 mmol/L    Glucose Level 106 75 - 121 mg/dL    Blood Urea Nitrogen 80.2 (H) 8.4 - 25.7 mg/dL    Creatinine 2.59 (H) 0.73 - 1.18 mg/dL    Calcium Level Total 8.7 (L) 8.8 - 10.0 mg/dL    Protein Total 6.1 5.8 - 7.6 gm/dL    Albumin Level 2.3 (L) 3.4 - 4.8 g/dL    Globulin 3.8 (H) 2.4 - 3.5 gm/dL    Albumin/Globulin Ratio 0.6 (L) 1.1 - 2.0 ratio    Bilirubin Total 0.4 <=1.5 mg/dL    Alkaline Phosphatase 47 40 - 150 unit/L    Alanine Aminotransferase 21 0 - 55 unit/L    Aspartate Aminotransferase 47 (H) 5 - 34 unit/L    eGFR 22 mls/min/1.73/m2   CBC with Differential    Collection Time: 01/03/24  2:18 AM   Result Value Ref Range    WBC 8.60 4.50 - 11.50 x10(3)/mcL    RBC 4.51 (L) 4.70 - 6.10 x10(6)/mcL    Hgb 12.2 (L) 14.0 - 18.0 g/dL    Hct 38.0 (L) 42.0 - 52.0 %    MCV 84.3 80.0 - 94.0 fL    MCH 27.1 27.0 - 31.0 pg    MCHC 32.1 (L) 33.0 - 36.0 g/dL    RDW 15.9 11.5 - 17.0 %    Platelet 191 130 - 400 x10(3)/mcL    MPV 11.5 (H) 7.4 - 10.4 fL    NRBC% 0.0 %   Manual Differential    Collection Time: 01/03/24  2:18 AM   Result Value Ref Range    WBC 8.6 x10(3)/mcL    Neutrophils % 82 %    Lymphs % 9 %    Metamyelocytes % 9 (H) <=0 %    Neutrophils Abs 7.052 2.1 - 9.2 x10(3)/mcL    Lymphs Abs 0.774 0.6 - 4.6 x10(3)/mcL    Platelets Normal Normal, Adequate    RBC Morph Normal Normal   RT Blood Gas    Collection Time: 01/03/24  4:52 AM   Result Value Ref Range    Sample Type Arterial Blood     Sample site Right Radial Artery     Drawn by ivan rt     pH, Blood gas 7.410 7.350 - 7.450    pCO2, Blood gas 30.0 (L) 35.0 - 45.0 mmHg    pO2, Blood gas 56.0 (L) 80.0 - 100.0 mmHg    Sodium, Blood Gas 135 (L) 137 - 145 mmol/L    Potassium, Blood Gas 4.7 3.5 - 5.0 mmol/L    Calcium Level Ionized 1.13 1.12 - 1.23 mmol/L    TOC2, Blood gas 19.9 mmol/L    Base Excess, Blood gas -4.60 mmol/L    sO2, Blood gas 89.0 %    HCO3, Blood gas 19.0 (L) 22.0 - 26.0 mmol/L    Allens Test  Yes     Oxygen Device, Blood gas Oxy Mask     LPM 15    RT Blood Gas    Collection Time: 01/03/24  7:35 AM   Result Value Ref Range    Sample Type Arterial Blood     Sample site Left Brachial Artery     Drawn by sd rrt     pH, Blood gas 7.400 7.350 - 7.450    pCO2, Blood gas 30.0 (L) 35.0 - 45.0 mmHg    pO2, Blood gas 60.0 (L) 80.0 - 100.0 mmHg    Sodium, Blood Gas 134 (L) 137 - 145 mmol/L    Potassium, Blood Gas 4.6 3.5 - 5.0 mmol/L    Calcium Level Ionized 1.15 1.12 - 1.23 mmol/L    TOC2, Blood gas 19.5 mmol/L    Base Excess, Blood gas -5.10 mmol/L    sO2, Blood gas 91.0 %    HCO3, Blood gas 18.6 (L) 22.0 - 26.0 mmol/L    Allens Test N/A     MODE CPAP     FIO2, Blood gas 55 %    CPAP 10 cm H2O   APTT    Collection Time: 01/03/24 11:20 AM   Result Value Ref Range    PTT 44.8 (H) 23.2 - 33.7 seconds   Protime-INR    Collection Time: 01/03/24 11:20 AM   Result Value Ref Range    PT 19.7 (H) 12.5 - 14.5 seconds    INR 1.7 (H) <=1.3   Vancomycin, Random    Collection Time: 01/03/24  7:13 PM   Result Value Ref Range    Vanc Lvl Random 14.6 (L) 15.0 - 20.0 ug/ml   PTT Heparin Monitoring    Collection Time: 01/03/24  7:13 PM   Result Value Ref Range    PTT Heparin Monitor 82.3 (H) 23.2 - 33.7 seconds   Comprehensive Metabolic Panel    Collection Time: 01/04/24  1:08 AM   Result Value Ref Range    Sodium Level 138 132 - 146 mmol/L    Potassium Level 4.3 3.5 - 5.1 mmol/L    Chloride 109 98 - 111 mmol/L    Carbon Dioxide 17 (L) 23 - 31 mmol/L    Glucose Level 159 (H) 75 - 121 mg/dL    Blood Urea Nitrogen 81.6 (H) 8.4 - 25.7 mg/dL    Creatinine 2.66 (H) 0.73 - 1.18 mg/dL    Calcium Level Total 8.5 (L) 8.8 - 10.0 mg/dL    Protein Total 5.1 (L) 5.8 - 7.6 gm/dL    Albumin Level 2.0 (L) 3.4 - 4.8 g/dL    Globulin 3.1 2.4 - 3.5 gm/dL    Albumin/Globulin Ratio 0.6 (L) 1.1 - 2.0 ratio    Bilirubin Total 0.4 <=1.5 mg/dL    Alkaline Phosphatase 70 40 - 150 unit/L    Alanine Aminotransferase 18 0 - 55 unit/L    Aspartate  Aminotransferase 39 (H) 5 - 34 unit/L    eGFR 22 mls/min/1.73/m2   CBC with Differential    Collection Time: 01/04/24  1:08 AM   Result Value Ref Range    WBC 10.33 4.50 - 11.50 x10(3)/mcL    RBC 4.27 (L) 4.70 - 6.10 x10(6)/mcL    Hgb 11.5 (L) 14.0 - 18.0 g/dL    Hct 35.1 (L) 42.0 - 52.0 %    MCV 82.2 80.0 - 94.0 fL    MCH 26.9 (L) 27.0 - 31.0 pg    MCHC 32.8 (L) 33.0 - 36.0 g/dL    RDW 15.9 11.5 - 17.0 %    Platelet 165 130 - 400 x10(3)/mcL    MPV 12.3 (H) 7.4 - 10.4 fL    NRBC% 0.0 %   Manual Differential    Collection Time: 01/04/24  1:08 AM   Result Value Ref Range    WBC 10.33 x10(3)/mcL    Neutrophils % 94 %    Lymphs % 4 %    Metamyelocytes % 2 (H) <=0 %    Neutrophils Abs 9.7102 (H) 2.1 - 9.2 x10(3)/mcL    Lymphs Abs 0.4132 (L) 0.6 - 4.6 x10(3)/mcL    Platelets Adequate Normal, Adequate    RBC Morph Normal Normal   APTT    Collection Time: 01/04/24  1:09 AM   Result Value Ref Range    PTT 79.3 (H) 23.2 - 33.7 seconds   APTT    Collection Time: 01/05/24  1:24 AM   Result Value Ref Range    PTT 67.6 (H) 23.2 - 33.7 seconds   Comprehensive Metabolic Panel    Collection Time: 01/05/24  1:24 AM   Result Value Ref Range    Sodium Level 141 132 - 146 mmol/L    Potassium Level 3.9 3.5 - 5.1 mmol/L    Chloride 110 98 - 111 mmol/L    Carbon Dioxide 19 (L) 23 - 31 mmol/L    Glucose Level 114 75 - 121 mg/dL    Blood Urea Nitrogen 86.3 (H) 8.4 - 25.7 mg/dL    Creatinine 2.63 (H) 0.73 - 1.18 mg/dL    Calcium Level Total 9.6 8.8 - 10.0 mg/dL    Protein Total 5.9 5.8 - 7.6 gm/dL    Albumin Level 1.8 (L) 3.4 - 4.8 g/dL    Globulin 4.1 (H) 2.4 - 3.5 gm/dL    Albumin/Globulin Ratio 0.4 (L) 1.1 - 2.0 ratio    Bilirubin Total 0.4 <=1.5 mg/dL    Alkaline Phosphatase 80 40 - 150 unit/L    Alanine Aminotransferase 15 0 - 55 unit/L    Aspartate Aminotransferase 34 5 - 34 unit/L    eGFR 22 mls/min/1.73/m2   CBC with Differential    Collection Time: 01/05/24  1:24 AM   Result Value Ref Range    WBC 9.49 4.50 - 11.50 x10(3)/mcL    RBC  4.52 (L) 4.70 - 6.10 x10(6)/mcL    Hgb 12.0 (L) 14.0 - 18.0 g/dL    Hct 37.3 (L) 42.0 - 52.0 %    MCV 82.5 80.0 - 94.0 fL    MCH 26.5 (L) 27.0 - 31.0 pg    MCHC 32.2 (L) 33.0 - 36.0 g/dL    RDW 15.8 11.5 - 17.0 %    Platelet 99 (L) 130 - 400 x10(3)/mcL    MPV 11.1 (H) 7.4 - 10.4 fL    NRBC% 0.0 %    IPF 5.7 0.9 - 11.2 %   Manual Differential    Collection Time: 01/05/24  1:24 AM   Result Value Ref Range    WBC 9.49 x10(3)/mcL    Neutrophils % 90 %    Lymphs % 6 %    Monocytes % 4 %    Neutrophils Abs 8.541 2.1 - 9.2 x10(3)/mcL    Lymphs Abs 0.5694 (L) 0.6 - 4.6 x10(3)/mcL    Monocytes Abs 0.3796 0.1 - 1.3 x10(3)/mcL    Platelets Decreased (A) Normal, Adequate    RBC Morph Normal Normal     Telemetry:  AF RVR    Physical Exam  Vitals and nursing note reviewed.   Constitutional:       General: He is not in acute distress.     Appearance: He is ill-appearing.   HENT:      Head: Normocephalic.   Cardiovascular:      Rate and Rhythm: Tachycardia present. Rhythm irregular.   Pulmonary:      Effort: Pulmonary effort is normal. No respiratory distress.      Comments: Right Posterior Breath Sounds are Diminished. BIPAP 60% FIO2  Musculoskeletal:      Cervical back: Neck supple.      Right lower leg: No edema.      Left lower leg: No edema.      Comments: Legs are Warm   Skin:     General: Skin is warm and dry.   Neurological:      Mental Status: He is alert. He is confused.      Comments: Awake/Alert with Intermittent Confusion.   Psychiatric:      Comments: Stephentens         Current Inpatient Medications:    Current Facility-Administered Medications:     0.9%  NaCl infusion, , Intravenous, Continuous, Bernardino Ordoñez MD, Stopped at 01/03/24 1223    acetaminophen tablet 1,000 mg, 1,000 mg, Oral, Q6H PRN, Grisel Valentine FNP, 1,000 mg at 01/02/24 1630    acetaminophen tablet 650 mg, 650 mg, Oral, Q4H PRN, Grisel Valentine FNP    albuterol nebulizer solution, 10 mg/hr, Nebulization, Continuous, Mendoza Mckeon,  MD, Last Rate: 12 mL/hr at 01/02/24 1256, 10 mg/hr at 01/02/24 1256    albuterol-ipratropium 2.5 mg-0.5 mg/3 mL nebulizer solution 3 mL, 3 mL, Nebulization, Q4H PRN, Grisel Valentine FNP, 3 mL at 01/03/24 0229    atorvastatin tablet 10 mg, 10 mg, Oral, Daily, Grisel Valentine FNP    calcitRIOL capsule 0.25 mcg, 0.25 mcg, Oral, Daily, Grisel Valentine FNP    cefTRIAXone (ROCEPHIN) 2 g in dextrose 5 % in water (D5W) 100 mL IVPB (MB+), 2 g, Intravenous, Q24H, Mili Shah FNP, Stopped at 01/05/24 1225    famotidine (PF) injection 20 mg, 20 mg, Intravenous, Daily, Gurdeep Shah MD, 20 mg at 01/05/24 1155    gabapentin capsule 300 mg, 300 mg, Oral, QHS, Grisel Valentine FNP    melatonin tablet 6 mg, 6 mg, Oral, Nightly PRN, Grisel Valentine FNP    morphine injection 1 mg, 1 mg, Intravenous, Q8H PRN, Thomas Palacios MD, 1 mg at 01/05/24 0053    mupirocin 2 % ointment, , Nasal, BID, Bernardino Ordoñez MD, Given at 01/05/24 0837    naloxone 0.4 mg/mL injection 0.02 mg, 0.02 mg, Intravenous, PRN, Grisel Valentine FNP    NORepinephrine 8 mg in dextrose 5% 250 mL infusion, 0-3 mcg/kg/min, Intravenous, Continuous, Fadumo Ramirez DO, Last Rate: 8.7 mL/hr at 01/05/24 1358, 0.06 mcg/kg/min at 01/05/24 1358    ondansetron injection 4 mg, 4 mg, Intravenous, Q4H PRN, Grisel Valentine FNP    prochlorperazine injection Soln 5 mg, 5 mg, Intravenous, Q6H PRN, Grisel Valentine FNP    simethicone chewable tablet 80 mg, 1 tablet, Oral, QID PRN, Grisel Valentine FNP    sodium chloride 0.9% flush 10 mL, 10 mL, Intravenous, PRN, Grisel Valentine, FNP    sodium chloride 0.9% flush 10 mL, 10 mL, Intravenous, PRN, Manny Sawant, DO    Flushing PICC/Midline Protocol, , , Until Discontinued **AND** sodium chloride 0.9% flush 10 mL, 10 mL, Intravenous, Q6H, 10 mL at 01/05/24 1156 **AND** sodium chloride 0.9% flush 10 mL, 10 mL, Intravenous, PRN, Fassadi,  Bernardino WHITT MD    VTE Risk Mitigation (From admission, onward)           Ordered     Reason for No Pharmacological VTE Prophylaxis  Once        Question:  Reasons:  Answer:  Already adequately anticoagulated on oral Anticoagulants    01/02/24 1329     IP VTE HIGH RISK PATIENT  Once         01/02/24 1329     Place sequential compression device  Until discontinued         01/02/24 1329                  Assessment:   Acute on Chronic Diastolic Heart Failure    - EF 60-65%  Atrial Fibrillation (Unspecified)- Now with RVR    - [id number]: 4    - On Eliquis Outpatient- Heparin Infusion Discontinued due to Thrombocytopenia (1.5.24 AM)  CAP  Bilateral Pleural Effusion (Right > Left)- ? Right Empyema     - ID Following   NSTEMI- Type II in the Setting of Heart Failure & Septic Shock  Septic Shock Requiring Vasopressor Support    - Strept Bacteremia/Urine Culture Positive for Klebsiella Pneumoniae   Valvular Heart Disease    - AS: Mild to Moderate, TR: Mild to Moderate, MS: Mild with Severe MAC  Acute Kidney Failure  Lactic Acidosis  Confusion    - CT Head Normal  UTI    - Chronic Ureteral Stent  Hyperlipidemia    - On Statin   History of Esophageal Varices  COPD  History of Merkel Cell Carcinoma on Ear  History of Prostate Cancer  Thrombocytopenia  COVID-19 & Influenza Negative on 1.2.24  No known History of GI Bleed    Plan:   Discontinue Amiodarone Infusion. Start Oral Tapered Amiodarone for short term, HR Control. If additional HR Control Required will consider Digoxin, but need to be cautious given Renal Dysfunction.  Discontinue Heparin Infusion due to Reduction in Platelet Count. Will need to Monitor.  Wean Vasopressor Support for Goal MAP 65 or Greater  Antibiotic Management as per ID Team  Hold Beta Blocker given Hypotension. Resume when BP Permits.  Supportive Care as per ICU Team.     Charles Taveras, NADIR  Cardiology  [facility] - [facility] ICU  01/05/2024     Physician addendum:  I have seen and examined  this patient as a split-shared visit with the DAYANA d/t complicated medical management of above problems written in assessment and high acuity requiring physician expertise in medical decision-making. I performed the substantive portion of the history and exam. Above medical decision-making is also formulated by me.    Cardiovascular exam:  S1, S2  Lungs:  fine crackles at bases.  Extremities:  1+ edema bilaterally    Plan:  DC heparin since patient has significant platelets drop.  Discontinue amiodarone.  Wean vasopressor support.  Antibiotic management as per ICU team.      Mukund Melo MD  Cardiologist

## 2024-01-05 NOTE — NURSING
Kortney, wife, @ bedside until 1530. She has been updated @ bedside and agrees that we will only call her this afternoon if something changes.

## 2024-01-05 NOTE — PROGRESS NOTES
UROLOGY  PROGRESS  NOTE    Francis Montero 3/1/1929  [id number]  1/5/2024    Family elected chemical code only yesterday  Patient resting in bed, more alert this morning compared to yesterday  Denies any pain  SP tube functioning well    afebrile  505 mL urine output overnight  WBC 9.49  H&H 12.0/37.3   BUN/creatinine 86.3/2.63    Urine culture with ESBL Klebsiella   Blood culture x2 with Gram-positive cocci probable Streptococcus    Intake/Output:  I/O this shift:  In: 75.9 [I.V.:75.9]  Out: 60 [Urine:60]  I/O last 3 completed shifts:  In: 2185.6 [I.V.:1335.6; IV Piggyback:850]  Out: 1930 [Urine:1930]     Exam:    NAD  Card: a-fib  Resp: on CPAP  : yellow urine draining to  bag. SPT site c/d/I without active drainage     Recent Results (from the past 24 hour(s))   APTT    Collection Time: 01/05/24  1:24 AM   Result Value Ref Range    PTT 67.6 (H) 23.2 - 33.7 seconds   Comprehensive Metabolic Panel    Collection Time: 01/05/24  1:24 AM   Result Value Ref Range    Sodium Level 141 132 - 146 mmol/L    Potassium Level 3.9 3.5 - 5.1 mmol/L    Chloride 110 98 - 111 mmol/L    Carbon Dioxide 19 (L) 23 - 31 mmol/L    Glucose Level 114 75 - 121 mg/dL    Blood Urea Nitrogen 86.3 (H) 8.4 - 25.7 mg/dL    Creatinine 2.63 (H) 0.73 - 1.18 mg/dL    Calcium Level Total 9.6 8.8 - 10.0 mg/dL    Protein Total 5.9 5.8 - 7.6 gm/dL    Albumin Level 1.8 (L) 3.4 - 4.8 g/dL    Globulin 4.1 (H) 2.4 - 3.5 gm/dL    Albumin/Globulin Ratio 0.4 (L) 1.1 - 2.0 ratio    Bilirubin Total 0.4 <=1.5 mg/dL    Alkaline Phosphatase 80 40 - 150 unit/L    Alanine Aminotransferase 15 0 - 55 unit/L    Aspartate Aminotransferase 34 5 - 34 unit/L    eGFR 22 mls/min/1.73/m2   CBC with Differential    Collection Time: 01/05/24  1:24 AM   Result Value Ref Range    WBC 9.49 4.50 - 11.50 x10(3)/mcL    RBC 4.52 (L) 4.70 - 6.10 x10(6)/mcL    Hgb 12.0 (L) 14.0 - 18.0 g/dL    Hct 37.3 (L) 42.0 - 52.0 %    MCV 82.5 80.0 - 94.0 fL    MCH 26.5 (L) 27.0 - 31.0 pg    MCHC 32.2  (L) 33.0 - 36.0 g/dL    RDW 15.8 11.5 - 17.0 %    Platelet 99 (L) 130 - 400 x10(3)/mcL    MPV 11.1 (H) 7.4 - 10.4 fL    NRBC% 0.0 %    IPF 5.7 0.9 - 11.2 %   Manual Differential    Collection Time: 01/05/24  1:24 AM   Result Value Ref Range    WBC 9.49 x10(3)/mcL    Neutrophils % 90 %    Lymphs % 6 %    Monocytes % 4 %    Neutrophils Abs 8.541 2.1 - 9.2 x10(3)/mcL    Lymphs Abs 0.5694 (L) 0.6 - 4.6 x10(3)/mcL    Monocytes Abs 0.3796 0.1 - 1.3 x10(3)/mcL    Platelets Decreased (A) Normal, Adequate    RBC Morph Normal Normal     Urine culture [[id number]] (Abnormal)  Collected: 01/02/24 1305   Order Status: Completed Specimen: Urine Updated: 01/04/24 0636    Urine Culture >/= 100,000 colonies/ml Klebsiella pneumoniae ssp pneumoniae Abnormal     Comment: ESBL (Extended spectrum beta-lactamase)      Susceptibility     Klebsiella pneumoniae ssp pneumoniae     Not Specified     Amox/K Clav 16 Intermediate     Ampicillin >=32 Resistant     Cefazolin >=64 Resistant     Cefepime >=32 Resistant     Ceftriaxone >=64 Resistant     Cefuroxime >=64 Resistant     Ciprofloxacin >=4 Resistant     Gentamicin <=1 Sensitive     Levofloxacin 2 Resistant     Meropenem <=0.25 Sensitive     Nitrofurantoin 64 Intermediate     Piperacillin/Tazobactam 16 Sensitive     Tetracycline >=16 Resistant     Tobramycin 8 Intermediate     Trimethoprim/Sulfamethoxazole >=320 Resistant               Linear View         Blood culture #1 **CANNOT BE ORDERED STAT** [[id number]] (Abnormal) Collected: 01/02/24 1156   Order Status: Completed Specimen: Blood Updated: 01/04/24 0731    CULTURE, BLOOD ([facility]) Susceptibility To Follow     Streptococcus pneumoniae Abnormal     GRAM STAIN Gram Positive Cocci, probable Streptococcus Panic      Seen in gram stain of broth only Panic      1 of 1 Pediatric bottle positive Panic    Blood culture #2 **CANNOT BE ORDERED STAT** [[id number]] (Abnormal) Collected: 01/02/24 1156   Order Status: Completed Specimen: Blood Updated:  01/04/24 0731    CULTURE, BLOOD ([facility]) Susceptibility To Follow     Streptococcus pneumoniae Abnormal     GRAM STAIN Gram Positive Cocci, probable Streptococcus Panic      Seen in gram stain of broth only Panic      1 of 1 Pediatric bottle positive Panic      2 of 2 bottles positive Panic        Assessment:  -UTI with chronic ureteral stent and SPT - Cultures with ESBL klebsiella  -bacteremia, sepsis - cultures with streptococcus  -acute on chronic respiratory failure  -STEPHEN on CKD  -h/o prostate cancer with stricture s/t radiation requiring chronic ureteral stent    Plan:  -No additional recs from Urology standpoint at this time  -CT abdomen/pelvis once stable enough for transfer for further evaluation, r/o abscess  -Available if needed for stent exchange.   -Following      Arianna Ornelas NP

## 2024-01-06 LAB
APTT PPP: 35 SECONDS (ref 23.2–33.7)
BASOPHILS # BLD AUTO: 0.05 X10(3)/MCL
BASOPHILS NFR BLD AUTO: 0.6 %
EOSINOPHIL # BLD AUTO: 0.01 X10(3)/MCL (ref 0–0.9)
EOSINOPHIL NFR BLD AUTO: 0.1 %
ERYTHROCYTE [DISTWIDTH] IN BLOOD BY AUTOMATED COUNT: 16 % (ref 11.5–17)
HCT VFR BLD AUTO: 39.7 % (ref 42–52)
HGB BLD-MCNC: 12.4 G/DL (ref 14–18)
IMM GRANULOCYTES # BLD AUTO: 0.06 X10(3)/MCL (ref 0–0.04)
IMM GRANULOCYTES NFR BLD AUTO: 0.7 %
LYMPHOCYTES # BLD AUTO: 0.62 X10(3)/MCL (ref 0.6–4.6)
LYMPHOCYTES NFR BLD AUTO: 7.4 %
MCH RBC QN AUTO: 26.3 PG (ref 27–31)
MCHC RBC AUTO-ENTMCNC: 31.2 G/DL (ref 33–36)
MCV RBC AUTO: 84.1 FL (ref 80–94)
MONOCYTES # BLD AUTO: 0.28 X10(3)/MCL (ref 0.1–1.3)
MONOCYTES NFR BLD AUTO: 3.3 %
NEUTROPHILS # BLD AUTO: 7.4 X10(3)/MCL (ref 2.1–9.2)
NEUTROPHILS NFR BLD AUTO: 87.9 %
NRBC BLD AUTO-RTO: 0 %
PLATELET # BLD AUTO: 92 X10(3)/MCL (ref 130–400)
PLATELETS.RETICULATED NFR BLD AUTO: 5.9 % (ref 0.9–11.2)
PMV BLD AUTO: 11.9 FL (ref 7.4–10.4)
RBC # BLD AUTO: 4.72 X10(6)/MCL (ref 4.7–6.1)
WBC # SPEC AUTO: 8.42 X10(3)/MCL (ref 4.5–11.5)

## 2024-01-06 PROCEDURE — 99900031 HC PATIENT EDUCATION (STAT)

## 2024-01-06 PROCEDURE — 85025 COMPLETE CBC W/AUTO DIFF WBC: CPT | Performed by: NURSE PRACTITIONER

## 2024-01-06 PROCEDURE — 27100171 HC OXYGEN HIGH FLOW UP TO 24 HOURS

## 2024-01-06 PROCEDURE — 85730 THROMBOPLASTIN TIME PARTIAL: CPT | Performed by: NURSE PRACTITIONER

## 2024-01-06 PROCEDURE — 20000000 HC ICU ROOM

## 2024-01-06 PROCEDURE — 25000003 PHARM REV CODE 250: Performed by: INTERNAL MEDICINE

## 2024-01-06 PROCEDURE — 93005 ELECTROCARDIOGRAM TRACING: CPT

## 2024-01-06 PROCEDURE — 25000003 PHARM REV CODE 250: Performed by: NURSE PRACTITIONER

## 2024-01-06 PROCEDURE — 94660 CPAP INITIATION&MGMT: CPT

## 2024-01-06 PROCEDURE — 94761 N-INVAS EAR/PLS OXIMETRY MLT: CPT

## 2024-01-06 PROCEDURE — 99900035 HC TECH TIME PER 15 MIN (STAT)

## 2024-01-06 PROCEDURE — A4216 STERILE WATER/SALINE, 10 ML: HCPCS | Performed by: INTERNAL MEDICINE

## 2024-01-06 PROCEDURE — 93010 ELECTROCARDIOGRAM REPORT: CPT | Mod: ,,, | Performed by: INTERNAL MEDICINE

## 2024-01-06 PROCEDURE — 27000207 HC ISOLATION

## 2024-01-06 PROCEDURE — 63600175 PHARM REV CODE 636 W HCPCS: Performed by: INTERNAL MEDICINE

## 2024-01-06 RX ORDER — METOPROLOL TARTRATE 1 MG/ML
5 INJECTION, SOLUTION INTRAVENOUS EVERY 5 MIN PRN
Status: DISCONTINUED | OUTPATIENT
Start: 2024-01-06 | End: 2024-01-11 | Stop reason: HOSPADM

## 2024-01-06 RX ORDER — CALCITRIOL 0.25 UG/1
0.25 CAPSULE ORAL DAILY
Status: DISCONTINUED | OUTPATIENT
Start: 2024-01-07 | End: 2024-01-11 | Stop reason: HOSPADM

## 2024-01-06 RX ADMIN — MUPIROCIN: 20 OINTMENT TOPICAL at 08:01

## 2024-01-06 RX ADMIN — CALCITRIOL CAPSULES 0.25 MCG 0.25 MCG: 0.25 CAPSULE ORAL at 08:01

## 2024-01-06 RX ADMIN — AMIODARONE HYDROCHLORIDE 400 MG: 200 TABLET ORAL at 08:01

## 2024-01-06 RX ADMIN — SODIUM CHLORIDE, PRESERVATIVE FREE 10 ML: 5 INJECTION INTRAVENOUS at 05:01

## 2024-01-06 RX ADMIN — SODIUM CHLORIDE, PRESERVATIVE FREE 10 ML: 5 INJECTION INTRAVENOUS at 06:01

## 2024-01-06 RX ADMIN — MEROPENEM 1 G: 1 INJECTION, POWDER, FOR SOLUTION INTRAVENOUS at 04:01

## 2024-01-06 RX ADMIN — Medication 6 MG: at 08:01

## 2024-01-06 RX ADMIN — ATORVASTATIN CALCIUM 10 MG: 10 TABLET, FILM COATED ORAL at 08:01

## 2024-01-06 RX ADMIN — ACETAMINOPHEN 325MG 650 MG: 325 TABLET ORAL at 05:01

## 2024-01-06 RX ADMIN — SODIUM CHLORIDE, PRESERVATIVE FREE 10 ML: 5 INJECTION INTRAVENOUS at 11:01

## 2024-01-06 RX ADMIN — SODIUM CHLORIDE: 9 INJECTION, SOLUTION INTRAVENOUS at 04:01

## 2024-01-06 RX ADMIN — GABAPENTIN 300 MG: 300 CAPSULE ORAL at 08:01

## 2024-01-06 RX ADMIN — SODIUM CHLORIDE, PRESERVATIVE FREE 10 ML: 5 INJECTION INTRAVENOUS at 12:01

## 2024-01-06 RX ADMIN — FAMOTIDINE 20 MG: 10 INJECTION, SOLUTION INTRAVENOUS at 08:01

## 2024-01-06 NOTE — PROGRESS NOTES
[facility] - [facility]    Cardiology  Progress Note    Patient Name: Francis Montero  MRN: [id number]  Admission Date: 1/2/2024  Hospital Length of Stay: 4 days  Code Status: Partial Code   Attending Physician: Bernardino Ordoñez MD   Primary Care Physician: Lisa, Primary Doctor  Expected Discharge Date:   Principal Problem:SOB (shortness of breath)    Subjective:   Consultation Reason: Abnormal Troponin & AF RVR     HPI:   Mr. Montero is a 89 y/o male,  unknown to [facility] (Followed by Dr. Major in [place]), who presented to Ed with c/o SOB. Admit VS- BP 70/30, P RA sat 85%. He was given 200ml NS enroute. In Ed he was given another 1.5L NS bolus with improvement in BP. Lab significant for WBC 13.89, K+ 5.7, CO2 16, BUN/creatinine 83.4/3.10, Mg 1.50, BNP 1970, troponin 0.236, Venous lactate 3.3. EKG SR with PACs. CXR consistent with pulmonary congestion and a large bilateral pleural effusions. Echo revealing EF 60-65% with mild to  moderate AS, mild MS and mild to moderate TR. Blood and urine cultures obtained and patient started on antibiotics. [facility] has been consulted for elevated troponin    Hospital Course:  1.3.24: NAD Noted. On BIPAP. He is awake but confused, requiring Mittens. AF RVR. On Amiodarone Infusion, He has a history of AF. Hypotensive Requiring Levophed Support. Legs are Warm.    1.4.24: BIPAP Support. Awake. AF RVR. Requiring Levophed Support. On Amiodarone Infusion. On Heparin Infusion for Stroke Risk Reduction Related to AF.   1.5.24: BIPAP. AF RVR. Remains on Levophed. Septic status noted. Family at bedside. Thrombocytopenia noted, Will discontinue Heparin. Also will discontinue Amiodarone Infusion.   1.6.24: NAD. Marginal BP. CPAP. AFIB CVR. On Pressors. Thrombocytopenia worse.        PMH: Afib/Eliquis, COPD, asbestosis, Merkel cell carcinoma of left ear anxiety, esophagitis, esophageal varices, hiatal hernia, merkel cell carcinoma, prostate cancer, chronic UTIs, HLD, PUD  PSH: surgical  resection with adjuvant radiotherapy to Left ear,  EGD, prostate surgery,  open shoulder rotator cuff repair  Family History:   Social History: former  smoker 40 pack years,      Previous Cardiac Diagnostics:   Echocardiogram (1.2.24):  Left Ventricle: The left ventricle is normal in size. Normal wall thickness. Normal wall motion. There is normal systolic function with a visually estimated ejection fraction of 60 - 65%. Diastolic function cannot be reliably determined in the presence of mitral valve disease. Inconclusive left ventricular filling pressure.  Right Ventricle: Normal right ventricular cavity size. Systolic function is normal.  Aortic Valve: There is mild to moderate stenosis. Aortic valve area by VTI is 1.23 cm². Aortic valve peak velocity is 2.63 m/s. Mean gradient is 18 mmHg. The dimensionless index is 0.39. There is trace aortic regurgitation.  Mitral Valve: There is severe mitral annular calcification present. There is mild stenosis. The mean pressure gradient across the mitral valve is 6 mmHg at a heart rate of  bpm.  Tricuspid Valve: There is mild to moderate regurgitation. The estimated PA systolic pressure is at least 38 mmHg.     Echocardiogram (2.22.22):  Left Ventricle: Left ventricle is normal in size and function. Normal   wall thickness.   Right Ventricle: Right ventricle is normal in size and function. Normal   wall thickness.   Mitral Valve: Mitral valve is normal in structure and function. Mildly   calcified leaflets.   Tricuspid Valve: Tricuspid valve is normal size and function.   Aortic Valve: Aortic valve is normal in structure and function.   Moderately calcified cusps.      Review of patient's allergies indicates:  No Known Allergies    Review of Systems   Unable to perform ROS: Acuity of condition     Objective:     Vital Signs (Most Recent):  Temp: 97.4 °F (36.3 °C) (01/06/24 0800)  Pulse: 71 (01/06/24 0937)  Resp: 17 (01/06/24 0937)  BP: 122/75 (01/06/24 0900)  SpO2: 97 %  (01/06/24 0937) Vital Signs (24h Range):  Temp:  [97.4 °F (36.3 °C)-98.2 °F (36.8 °C)] 97.4 °F (36.3 °C)  Pulse:  [] 71  Resp:  [15-42] 17  SpO2:  [87 %-100 %] 97 %  BP: ()/() 122/75  Arterial Line BP: (-)/(-) 101/51     Weight: 72.5 kg (159 lb 13.3 oz)  Body mass index is 26.6 kg/m².    SpO2: 97 %         Intake/Output Summary (Last 24 hours) at 1/6/2024 0942  Last data filed at 1/6/2024 0800  Gross per 24 hour   Intake 502.95 ml   Output 880 ml   Net -377.05 ml         Lines/Drains/Airways       Peripherally Inserted Central Catheter Line  Duration             PICC Triple Lumen 01/04/24 1725 left basilic 1 day              Drain  Duration                  NG/OG Tube 01/05/24 1500 Carr sump 16 Fr. Left nostril <1 day         Suprapubic Catheter 01/05/24 1720 24 Fr. <1 day              Peripheral Intravenous Line  Duration                  Peripheral IV - Single Lumen 01/02/24 1006 18 G Right Antecubital 3 days         Peripheral IV - Single Lumen 01/04/24 1459 20 G Anterior;Left;Proximal Forearm 1 day                  Significant Labs:   Recent Results (from the past 72 hour(s))   APTT    Collection Time: 01/03/24 11:20 AM   Result Value Ref Range    PTT 44.8 (H) 23.2 - 33.7 seconds   Protime-INR    Collection Time: 01/03/24 11:20 AM   Result Value Ref Range    PT 19.7 (H) 12.5 - 14.5 seconds    INR 1.7 (H) <=1.3   Vancomycin, Random    Collection Time: 01/03/24  7:13 PM   Result Value Ref Range    Vanc Lvl Random 14.6 (L) 15.0 - 20.0 ug/ml   PTT Heparin Monitoring    Collection Time: 01/03/24  7:13 PM   Result Value Ref Range    PTT Heparin Monitor 82.3 (H) 23.2 - 33.7 seconds   Comprehensive Metabolic Panel    Collection Time: 01/04/24  1:08 AM   Result Value Ref Range    Sodium Level 138 132 - 146 mmol/L    Potassium Level 4.3 3.5 - 5.1 mmol/L    Chloride 109 98 - 111 mmol/L    Carbon Dioxide 17 (L) 23 - 31 mmol/L    Glucose Level 159 (H) 75 - 121 mg/dL    Blood Urea Nitrogen 81.6  (H) 8.4 - 25.7 mg/dL    Creatinine 2.66 (H) 0.73 - 1.18 mg/dL    Calcium Level Total 8.5 (L) 8.8 - 10.0 mg/dL    Protein Total 5.1 (L) 5.8 - 7.6 gm/dL    Albumin Level 2.0 (L) 3.4 - 4.8 g/dL    Globulin 3.1 2.4 - 3.5 gm/dL    Albumin/Globulin Ratio 0.6 (L) 1.1 - 2.0 ratio    Bilirubin Total 0.4 <=1.5 mg/dL    Alkaline Phosphatase 70 40 - 150 unit/L    Alanine Aminotransferase 18 0 - 55 unit/L    Aspartate Aminotransferase 39 (H) 5 - 34 unit/L    eGFR 22 mls/min/1.73/m2   CBC with Differential    Collection Time: 01/04/24  1:08 AM   Result Value Ref Range    WBC 10.33 4.50 - 11.50 x10(3)/mcL    RBC 4.27 (L) 4.70 - 6.10 x10(6)/mcL    Hgb 11.5 (L) 14.0 - 18.0 g/dL    Hct 35.1 (L) 42.0 - 52.0 %    MCV 82.2 80.0 - 94.0 fL    MCH 26.9 (L) 27.0 - 31.0 pg    MCHC 32.8 (L) 33.0 - 36.0 g/dL    RDW 15.9 11.5 - 17.0 %    Platelet 165 130 - 400 x10(3)/mcL    MPV 12.3 (H) 7.4 - 10.4 fL    NRBC% 0.0 %   Manual Differential    Collection Time: 01/04/24  1:08 AM   Result Value Ref Range    WBC 10.33 x10(3)/mcL    Neutrophils % 94 %    Lymphs % 4 %    Metamyelocytes % 2 (H) <=0 %    Neutrophils Abs 9.7102 (H) 2.1 - 9.2 x10(3)/mcL    Lymphs Abs 0.4132 (L) 0.6 - 4.6 x10(3)/mcL    Platelets Adequate Normal, Adequate    RBC Morph Normal Normal   APTT    Collection Time: 01/04/24  1:09 AM   Result Value Ref Range    PTT 79.3 (H) 23.2 - 33.7 seconds   APTT    Collection Time: 01/05/24  1:24 AM   Result Value Ref Range    PTT 67.6 (H) 23.2 - 33.7 seconds   Comprehensive Metabolic Panel    Collection Time: 01/05/24  1:24 AM   Result Value Ref Range    Sodium Level 141 132 - 146 mmol/L    Potassium Level 3.9 3.5 - 5.1 mmol/L    Chloride 110 98 - 111 mmol/L    Carbon Dioxide 19 (L) 23 - 31 mmol/L    Glucose Level 114 75 - 121 mg/dL    Blood Urea Nitrogen 86.3 (H) 8.4 - 25.7 mg/dL    Creatinine 2.63 (H) 0.73 - 1.18 mg/dL    Calcium Level Total 9.6 8.8 - 10.0 mg/dL    Protein Total 5.9 5.8 - 7.6 gm/dL    Albumin Level 1.8 (L) 3.4 - 4.8 g/dL     Globulin 4.1 (H) 2.4 - 3.5 gm/dL    Albumin/Globulin Ratio 0.4 (L) 1.1 - 2.0 ratio    Bilirubin Total 0.4 <=1.5 mg/dL    Alkaline Phosphatase 80 40 - 150 unit/L    Alanine Aminotransferase 15 0 - 55 unit/L    Aspartate Aminotransferase 34 5 - 34 unit/L    eGFR 22 mls/min/1.73/m2   CBC with Differential    Collection Time: 01/05/24  1:24 AM   Result Value Ref Range    WBC 9.49 4.50 - 11.50 x10(3)/mcL    RBC 4.52 (L) 4.70 - 6.10 x10(6)/mcL    Hgb 12.0 (L) 14.0 - 18.0 g/dL    Hct 37.3 (L) 42.0 - 52.0 %    MCV 82.5 80.0 - 94.0 fL    MCH 26.5 (L) 27.0 - 31.0 pg    MCHC 32.2 (L) 33.0 - 36.0 g/dL    RDW 15.8 11.5 - 17.0 %    Platelet 99 (L) 130 - 400 x10(3)/mcL    MPV 11.1 (H) 7.4 - 10.4 fL    NRBC% 0.0 %    IPF 5.7 0.9 - 11.2 %   Manual Differential    Collection Time: 01/05/24  1:24 AM   Result Value Ref Range    WBC 9.49 x10(3)/mcL    Neutrophils % 90 %    Lymphs % 6 %    Monocytes % 4 %    Neutrophils Abs 8.541 2.1 - 9.2 x10(3)/mcL    Lymphs Abs 0.5694 (L) 0.6 - 4.6 x10(3)/mcL    Monocytes Abs 0.3796 0.1 - 1.3 x10(3)/mcL    Platelets Decreased (A) Normal, Adequate    RBC Morph Normal Normal   Urinalysis, Reflex to Urine Culture    Collection Time: 01/05/24  6:00 PM    Specimen: Urine   Result Value Ref Range    Color, UA Light-Yellow Yellow, Light-Yellow, Colorless, Straw, Dark-Yellow    Appearance, UA Turbid (A) Clear    Specific Gravity, UA 1.019 1.005 - 1.030    pH, UA 5.5 5.0 - 8.5    Protein, UA 1+ (A) Negative    Glucose, UA Normal Negative, Normal    Ketones, UA Negative Negative    Blood, UA 2+ (A) Negative    Bilirubin, UA Negative Negative    Urobilinogen, UA Normal 0.2, 1.0, Normal    Nitrites, UA Negative Negative    Leukocyte Esterase,  (A) Negative    WBC, UA 21-50 (A) None Seen, 0-2, 3-5, 0-5 /HPF    Bacteria, UA Trace None Seen, Trace /HPF    Squamous Epithelial Cells, UA None Seen None Seen /HPF    RBC, UA 50-99 (A) None Seen, 0-2, 3-5, 0-5 /HPF   Urine culture    Collection Time: 01/05/24  6:00  PM    Specimen: Urine   Result Value Ref Range    Urine Culture No Growth At 24 Hours    APTT    Collection Time: 01/06/24  1:59 AM   Result Value Ref Range    PTT 35.0 (H) 23.2 - 33.7 seconds   CBC with Differential    Collection Time: 01/06/24  1:59 AM   Result Value Ref Range    WBC 8.42 4.50 - 11.50 x10(3)/mcL    RBC 4.72 4.70 - 6.10 x10(6)/mcL    Hgb 12.4 (L) 14.0 - 18.0 g/dL    Hct 39.7 (L) 42.0 - 52.0 %    MCV 84.1 80.0 - 94.0 fL    MCH 26.3 (L) 27.0 - 31.0 pg    MCHC 31.2 (L) 33.0 - 36.0 g/dL    RDW 16.0 11.5 - 17.0 %    Platelet 92 (L) 130 - 400 x10(3)/mcL    MPV 11.9 (H) 7.4 - 10.4 fL    Neut % 87.9 %    Lymph % 7.4 %    Mono % 3.3 %    Eos % 0.1 %    Basophil % 0.6 %    Lymph # 0.62 0.6 - 4.6 x10(3)/mcL    Neut # 7.40 2.1 - 9.2 x10(3)/mcL    Mono # 0.28 0.1 - 1.3 x10(3)/mcL    Eos # 0.01 0 - 0.9 x10(3)/mcL    Baso # 0.05 <=0.2 x10(3)/mcL    IG# 0.06 (H) 0 - 0.04 x10(3)/mcL    IG% 0.7 %    NRBC% 0.0 %    IPF 5.9 0.9 - 11.2 %     Telemetry:  AF RVR    Physical Exam  Vitals and nursing note reviewed.   Constitutional:       General: He is not in acute distress.     Appearance: He is ill-appearing.   HENT:      Head: Normocephalic.   Cardiovascular:      Rate and Rhythm: Tachycardia present. Rhythm irregular.   Pulmonary:      Effort: Pulmonary effort is normal. No respiratory distress.      Comments: Right Posterior Breath Sounds are Diminished. BIPAP/CPAP 60% FIO2  Musculoskeletal:      Cervical back: Neck supple.      Right lower leg: No edema.      Left lower leg: No edema.      Comments: Legs are Warm   Skin:     General: Skin is warm and dry.   Neurological:      Mental Status: He is alert. He is confused.      Comments: Awake/Alert with Intermittent Confusion.   Psychiatric:      Comments: Toya         Current Inpatient Medications:  Current Facility-Administered Medications:     0.9%  NaCl infusion, , Intravenous, Continuous, Bernardino Ordoñez MD, Stopped at 01/03/24 1223    acetaminophen tablet  1,000 mg, 1,000 mg, Oral, Q6H PRN, Grisel Valentine FNP, 1,000 mg at 01/02/24 1630    acetaminophen tablet 650 mg, 650 mg, Oral, Q4H PRN, Grisel Valentine FNP    albuterol nebulizer solution, 10 mg/hr, Nebulization, Continuous, Mendoza Mckeon MD, Last Rate: 12 mL/hr at 01/02/24 1256, 10 mg/hr at 01/02/24 1256    albuterol-ipratropium 2.5 mg-0.5 mg/3 mL nebulizer solution 3 mL, 3 mL, Nebulization, Q4H PRN, Grisel Valentine FNP, 3 mL at 01/03/24 0229    amiodarone tablet 400 mg, 400 mg, Oral, BID, 400 mg at 01/06/24 0835 **FOLLOWED BY** [START ON 1/8/2024] amiodarone tablet 200 mg, 200 mg, Oral, BID **FOLLOWED BY** [START ON 1/11/2024] amiodarone tablet 200 mg, 200 mg, Oral, Daily, Charles Taveras, DELP    atorvastatin tablet 10 mg, 10 mg, Oral, Daily, Grisel Valentine FNP, 10 mg at 01/06/24 0834    calcitRIOL capsule 0.25 mcg, 0.25 mcg, Oral, Daily, Grisel Valentine FNP, 0.25 mcg at 01/06/24 0834    famotidine (PF) injection 20 mg, 20 mg, Intravenous, Daily, Gurdeep Shah MD, 20 mg at 01/06/24 0833    gabapentin capsule 300 mg, 300 mg, Oral, QHS, Grisel Valentine FNP    melatonin tablet 6 mg, 6 mg, Oral, Nightly PRN, Grisel Valentine FNP    meropenem (MERREM) 1 g in sodium chloride 0.9 % 100 mL IVPB (MB+), 1 g, Intravenous, Q12H, Bernardino Ordoñez MD, Stopped at 01/06/24 0504    morphine injection 1 mg, 1 mg, Intravenous, Q8H PRN, Thomas Palacios MD, 1 mg at 01/05/24 0053    mupirocin 2 % ointment, , Nasal, BID, Bernardino Ordoñez MD, Given at 01/06/24 0837    naloxone 0.4 mg/mL injection 0.02 mg, 0.02 mg, Intravenous, PRN, Grisel Valentine FNP    NORepinephrine 8 mg in dextrose 5% 250 mL infusion, 0-3 mcg/kg/min, Intravenous, Continuous, Fadumo Ramirez DO, Stopped at 01/06/24 0434    ondansetron injection 4 mg, 4 mg, Intravenous, Q4H PRN, Grisel Valentine FNP    prochlorperazine injection Soln 5 mg, 5 mg, Intravenous, Q6H PRN, Black-Munoz,  Grisel QUINTANA, DELP    simethicone chewable tablet 80 mg, 1 tablet, Oral, QID PRN, Grisel Valentine FNP    sodium chloride 0.9% flush 10 mL, 10 mL, Intravenous, PRN, Grisel Valentine, FNP    sodium chloride 0.9% flush 10 mL, 10 mL, Intravenous, PRN, Manny Sawant, DO    Flushing PICC/Midline Protocol, , , Until Discontinued **AND** sodium chloride 0.9% flush 10 mL, 10 mL, Intravenous, Q6H, 10 mL at 01/06/24 0529 **AND** sodium chloride 0.9% flush 10 mL, 10 mL, Intravenous, PRN, Bernardino Ordoñez MD    VTE Risk Mitigation (From admission, onward)           Ordered     Reason for No Pharmacological VTE Prophylaxis  Once        Question:  Reasons:  Answer:  Already adequately anticoagulated on oral Anticoagulants    01/02/24 1329     IP VTE HIGH RISK PATIENT  Once         01/02/24 1329     Place sequential compression device  Until discontinued         01/02/24 1329                  Assessment:   Acute on Chronic Diastolic Heart Failure    - EF 60-65%  Atrial Fibrillation (Unspecified)- Now with CVR    - [id number]: 4    - On Eliquis Outpatient- Heparin Infusion Discontinued due to Thrombocytopenia (1.5.24 AM)  CAP  Bilateral Pleural Effusion (Right > Left)- ? Right Empyema     - ID Following   NSTEMI- Type II in the Setting of Heart Failure & Septic Shock  Septic Shock Requiring Vasopressor Support    - Strept Bacteremia/Urine Culture Positive for Klebsiella Pneumoniae   Valvular Heart Disease    - AS: Mild to Moderate, TR: Mild to Moderate, MS: Mild with Severe MAC  Acute Kidney Failure  Lactic Acidosis  Confusion    - CT Head Normal  UTI    - Chronic Ureteral Stent  Hyperlipidemia    - On Statin   History of Esophageal Varices  COPD  History of Merkel Cell Carcinoma on Ear  History of Prostate Cancer  COVID-19 & Influenza Negative on 1.2.24  No known History of GI Bleed    Plan:   Continue Amiodarone Taper   Continue Statin   Wean Vasopressor Support for Goal MAP 65 or Greater  Antibiotic Management  as per ID Team  PRN Lopressor 5 mg for Sustained HR > 120  Supportive Care as per ICU Team.   Labs in AM: CBC, BMP, and Mag     NADIR Escobedo  Cardiology  [facility] - [facility]  01/06/2024   Physician addendum:  I have seen and examined this patient as a split-shared visit with the DAYANA d/t complicated medical management of above problems written in assessment and high acuity requiring physician expertise in medical decision-making. I performed the substantive portion of the history and exam. Above medical decision-making is also formulated by me.    Cardiovascular exam:  S1, S2  Lungs:  fine crackles at bases.  Extremities:  1+ edema bilaterally    Plan:  Continue amiodarone taper as above.  Can use beta-blocker as p.r.n. in case of tachycardia.  Antibiotics management as per primary team.    Mukund Melo MD  Cardiologist

## 2024-01-06 NOTE — PLAN OF CARE
Problem: Skin Injury Risk Increased  Goal: Skin Health and Integrity  Outcome: Ongoing, Progressing     Problem: Fall Injury Risk  Goal: Absence of Fall and Fall-Related Injury  Outcome: Ongoing, Progressing     Problem: Adult Inpatient Plan of Care  Goal: Patient-Specific Goal (Individualized)  Outcome: Ongoing, Not Progressing  Flowsheets (Taken 1/5/2024 1920)  Individualized Care Needs: unable to state     Problem: Infection  Goal: Absence of Infection Signs and Symptoms  Outcome: Ongoing, Not Progressing

## 2024-01-06 NOTE — NURSING
Nurses Note -- 4 Eyes      1/6/2024   3:20 PM      Skin assessed during: Daily Assessment      [x] No Altered Skin Integrity Present    [x]Prevention Measures Documented      [] Yes- Altered Skin Integrity Present or Discovered   [] LDA Added if Not in Epic (Describe Wound)   [] New Altered Skin Integrity was Present on Admit and Documented in LDA   [] Wound Image Taken    Wound Care Consulted? No    Attending Nurse:  Dirk Jade RN     Second RN/Staff Member:   Zahraa Gupta RN

## 2024-01-06 NOTE — PROGRESS NOTES
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  Patient was originally started on IV antibiotics however during his stay began to become progressively hypotensive.  Patient received 3 L fluid boluses which did not improve patient's blood pressure to a map greater than 65 at which point patient was initiated on Levophed for hemodynamic support.  Of note, patient is visiting [place] and reports that he has been feeling somewhat ill the last 2 days.  Admits to dysuria, fatigue, myalgia, shortness a breath.  Reports no chest pain, palpitations, fevers, chills, nausea, vomiting.  ICU was consulted for upgrade.    Hospital Course/Significant events:  01/01/2024:  Admitted to [facility]  01/02/2024:  Upgraded to ICU, initiated on Levophed  01/04/2024: PICC line placed   01/05/2024: Suprapubic catheter exchanged    24 Hour Interval History:  No acute events occurred overnight. Vital signs remained stable and patient is afebrile. Patient is now off of pressors since 4:30AM. Currently on CPAP with FiO2 of 60. Urine output of 880cc over last 24 hours. Urology exchanged suprapubic catheter yesterday. Awaiting CT scan of abdomen to r/o abscess which was not completed yesterday. Repeat blood cultures and respiratory culture pending, repeat urine culture negative for growth at 24 hours. Cardiology transitioned amio drip to  oral taper. Labs appear stable, elevated PT and INR improving.     Past Medical History:   Diagnosis Date    CKD (chronic kidney disease)     COPD (chronic obstructive pulmonary disease)     HLD (hyperlipidemia)     HTN (hypertension)     Merkel cell carcinoma     Paroxysmal A-fib     Prostate cancer     PUD (peptic ulcer disease)      Past Surgical History:   Procedure Laterality Date    INGUINAL HERNIA REPAIR      PROSTATE SURGERY      ROTATOR CUFF REPAIR       Social History     Socioeconomic History    Marital status:      Social Determinants of Health     Housing Stability: Unknown (1/3/2024)    Housing Stability Vital Sign     Unable to Pay for Housing in the Last Year: No     Current Outpatient Medications   Medication Instructions    atorvastatin (LIPITOR) 10 mg, Oral, Daily    calcitRIOL (ROCALTROL) 0.25 mcg, Oral, Daily, Takes every other day    gabapentin (NEURONTIN) 300 mg, Oral, Nightly    metoprolol tartrate (LOPRESSOR) 25 mg, Oral, 2 times daily    NON FORMULARY MEDICATION 850 mg, Oral, Daily, Umary     Current Inpatient Medications   amiodarone  400 mg Oral BID    Followed by    [START ON 1/8/2024] amiodarone  200 mg Oral BID    Followed by    [START ON 1/11/2024] amiodarone  200 mg Oral Daily    atorvastatin  10 mg Oral Daily    calcitRIOL  0.25 mcg Oral Daily    famotidine (PF)  20 mg Intravenous Daily    gabapentin  300 mg Oral QHS    meropenem (MERREM) IVPB  1 g Intravenous Q12H    mupirocin   Nasal BID    sodium chloride 0.9%  10 mL Intravenous Q6H     Current Intravenous Infusions   sodium chloride 0.9% Stopped (01/03/24 1223)    albuterol 0.083% 10 mg/hr (01/02/24 1256)    NORepinephrine bitartrate-D5W Stopped (01/06/24 0434)       Review of Systems   Constitutional:  Positive for chills and fever. Negative for weight loss.   HENT:  Negative for congestion and sinus pain.    Eyes:  Negative for blurred vision.   Respiratory:  Negative for cough, hemoptysis and wheezing.     Cardiovascular:  Negative for chest pain and palpitations.   Gastrointestinal:  Negative for abdominal pain, nausea and vomiting.   Genitourinary:  Positive for dysuria and urgency. Negative for frequency and hematuria.   Musculoskeletal:  Positive for back pain and neck pain. Negative for myalgias.   Skin:  Negative for itching and rash.   Neurological:  Negative for dizziness, tingling, sensory change and headaches.      Objective:       Intake/Output Summary (Last 24 hours) at 1/6/2024 0934  Last data filed at 1/6/2024 0532  Gross per 24 hour   Intake 502.95 ml   Output 820 ml   Net -317.05 ml       Vital Signs (Most Recent):  Temp: 97.5 °F (36.4 °C) (01/06/24 0401)  Pulse: 61 (01/06/24 0600)  Resp: (!) 21 (01/06/24 0600)  BP: 126/70 (01/06/24 0600)  SpO2: (!) 90 % (01/06/24 0600)  Body mass index is 26.6 kg/m².  Weight: 72.5 kg (159 lb 13.3 oz) Vital Signs (24h Range):  Temp:  [97.5 °F (36.4 °C)-98.2 °F (36.8 °C)] 97.5 °F (36.4 °C)  Pulse:  [] 61  Resp:  [15-42] 21  SpO2:  [90 %-100 %] 90 %  BP: ()/() 126/70  Arterial Line BP: (-)/(-) 101/51     Physical Exam  Constitutional:       General: He is not in acute distress.     Appearance: He is ill-appearing.      Comments: Sleeping upon exam. Oxygen mask in proper place   HENT:      Head:      Comments: CPAP in place. PICC line placed, dressing intact.   Cardiovascular:      Rate and Rhythm: Tachycardia present.      Pulses: Normal pulses.      Heart sounds: Murmur heard.      No friction rub. No gallop.      Comments: Systolic murmur noted  Pulmonary:      Effort: Pulmonary effort is normal.      Breath sounds: No stridor. Rhonchi and rales present. No wheezing.   Abdominal:      General: Bowel sounds are normal.      Palpations: Abdomen is soft.      Tenderness: There is no abdominal tenderness. There is no guarding.   Musculoskeletal:         General: No swelling.      Right lower leg: No edema.      Left lower leg: No edema.    Neurological:      General: No focal deficit present.      Mental Status: He is alert and oriented to person, place, and time.       Lines/Drains/Airways       Peripherally Inserted Central Catheter Line  Duration             PICC Triple Lumen 01/04/24 1725 left basilic 1 day              Drain  Duration                  NG/OG Tube 01/05/24 1500 [place] sump 16 Fr. Left nostril <1 day         Suprapubic Catheter 01/05/24 1720 24 Fr. <1 day              Peripheral Intravenous Line  Duration                  Peripheral IV - Single Lumen 01/02/24 1006 18 G Right Antecubital 3 days         Peripheral IV - Single Lumen 01/04/24 1459 20 G Anterior;Left;Proximal Forearm 1 day                    Significant Labs:  Lab Results   Component Value Date    WBC 8.42 01/06/2024    HGB 12.4 (L) 01/06/2024    HCT 39.7 (L) 01/06/2024    MCV 84.1 01/06/2024    PLT 92 (L) 01/06/2024     BMP  Lab Results   Component Value Date     01/05/2024    K 3.9 01/05/2024    CHLORIDE 110 01/05/2024    CO2 19 (L) 01/05/2024    BUN 86.3 (H) 01/05/2024    CREATININE 2.63 (H) 01/05/2024    CALCIUM 9.6 01/05/2024     ABG  Recent Labs   Lab 01/03/24  0735   PH 7.400   PO2 60.0*   PCO2 30.0*   HCO3 18.6*   POCBASEDEF -5.10       Mechanical Ventilation Support:  Oxygen Concentration (%): 60 (01/06/24 0400)    Significant Imaging:  Imaging Results              X-Ray Chest AP Portable (Final result)  Result time 01/02/24 21:00:06      Final result by Darrius Mcpherson MD (01/02/24 21:00:06)                   Impression:      Changes most consistent with pulmonary edema and a large right-sided pleural effusion.  This appears grossly stable from the previous exam      Electronically signed by: Darrius Mcpherson MD  Date:    01/02/2024  Time:    21:00               Narrative:    EXAMINATION:  XR CHEST AP PORTABLE    CLINICAL HISTORY:  Shortness of breath    TECHNIQUE:  Single frontal view of the chest was  performed.    COMPARISON:  01/02/2024    FINDINGS:  There is significant opacification of the right hemithorax cannot rule out a large effusion.  There are calcified pleural plaques suggestive of previous X best is exposure.  There are increased markings bilaterally most consistent with pulmonary edema                                       US Retroperitoneal Complete (Final result)  Result time 01/02/24 15:46:53      Final result by Soren Miranda MD (01/02/24 15:46:53)                   Impression:      1. No hydronephrosis.  2. Medical renal disease.      Electronically signed by: Soren Miranda  Date:    01/02/2024  Time:    15:46               Narrative:    EXAMINATION:  US RETROPERITONEAL COMPLETE    CLINICAL HISTORY:  elevated BUN/Creat;    COMPARISON:  None.    FINDINGS:  Grayscale and color Doppler sonographic evaluation of the kidneys and urinary bladder.    The right kidney measures 8 cm. The left kidney measures 8 cm.   No hydronephrosis.  Heterogeneous renal parenchymal echogenicity with loss of corticomedullary differentiation.    Bladder is not visualized.                                       CT Head Without Contrast (Final result)  Result time 01/02/24 15:12:29      Final result by Alejandrina Rodriguez MD (01/02/24 15:12:29)                   Impression:      1. No acute intracranial abnormality.  2. Chronic microvascular ischemic changes.      Electronically signed by: Alejandrina Rodriguez  Date:    01/02/2024  Time:    15:12               Narrative:    EXAMINATION:  CT HEAD WITHOUT CONTRAST    CLINICAL HISTORY:  Mental status change, unknown cause;    TECHNIQUE:  Axial scans were obtained from skull base to the vertex.    Coronal and sagittal reconstructions obtained from the axial data.    Automatic exposure control was utilized to limit radiation dose.    Contrast: None    Radiation Dose:    Total DLP: 1029 mGy*cm    COMPARISON:  None    FINDINGS:  There is no acute intracranial hemorrhage or edema.   There is a small area of encephalomalacia in the left cerebellum.    There is no mass effect or midline shift.  There is diffuse parenchymal volume loss.  The basal cisterns are patent. There is no abnormal extra-axial fluid collection.  Carotid artery calcifications are noted.    The calvarium and skull base are intact.  There are bilateral mastoid effusions, right greater than left.                                       X-Ray Chest 1 View (Final result)  Result time 01/02/24 10:38:49      Final result by Eligio Shaikh MD (01/02/24 10:38:49)                   Impression:      Changes suggestive of pulmonary vascular congestion and cardiac decompensation.    Slightly more confluent opacities at the right base infiltrate cannot be completely excluded.    Right-sided pleural effusion      Electronically signed by: Eligio Shaikh  Date:    01/02/2024  Time:    10:38               Narrative:    EXAMINATION:  XR CHEST 1 VIEW    CPT 00485    CLINICAL HISTORY:  SOB;    FINDINGS:  Examination reveals mediastinal silhouette to be within normal limits cardiac silhouette is not enlarged there is increase in interstitial and pulmonary vascular markings indicating some degree of pulmonary vascular congestion and cardiac decompensation.    Slightly more confluent opacities identified at the right base superimposed infiltrate can not be completely excluded.    There is blunting of the right costophrenic angle representing a right-sided pleural effusion    No other focal consolidative changes                                   I have reviewed the pertinent imaging within the past 24 hours.    Assessment/Plan:     Assessment  Septic Shock  Acute Cystitis without hematuria  -Hx of urinary retension   - hx of urethral stent  -initial urine culture positive for klebsiella pneumoniae  Community Acquired Pneumonia  Bacteremia -strep pneumoniae  Acute Hypoxic on chronic hypercarbic respiratory failure  - hx of COPD  - hx of  asbestosis, pulmonary fibrosis?  Right pleural effusion  Acute renal failure  - hx of CKD with unknown baseline  Metabolic Acidosis  - lactic acidosis, respiratory compensation  NSTEMI  HFpEF, decompensated EF 60% with diastolic failure on most recent TTE  Hyperkalemia  Hypomagnesemia  Paroxysmal Afib  Hx of prostate cancer  Hx of merkel cell carcinoma      Plan  -Continue critical care level management at this time.   -Currently off pressors, maintain MAP goal >65   -Continue with supplement oxygen, can titrate for goal SpO2 88-92% and wean as tolerated   -Continue Nebulized treatments for wheezing  -Cardiology following, appreciate their assistance   -Discontinue heparin due to thrombocytopenia    -Amiodarone drip stopped yesterday for oral taper    -Consider digoxin if further HR control if needed with caution due to renal dysfunction    -Hold beta blocker given hypotension, resume with BP improves    -Tolerated Lasix trials    -Hold Eliquis if thoracentesis planned   -ID following, appreciate recommendations   -Obtain sputum culture if possible   -Recommended CT chest   -Retroperitoneal U/S negative for hydronephrosis  -Continue Merrem 1/5/24 as there was no improvement with Rocephin initially; possible transition back   -Initial BC done on 1/2/24 + Strep Pneumo 2/2 on ; sensitive to gent, meropenem and zosyn  -Repeat blood cultures x 2 collected on 1/5/24 pending   -Repeat urine culture negative for growth at 24 hours   -Urology consulted and signed off on 1/5/24, appreciate their assistance  -low suspicion that sepsis is from his catheter  -Awaiting CT scan of abdomen to r/o abscess   -Supra pubic bladder catheter exchanged 1/5/24   -Patient's family discussing potential [place], we will discuss with case management in the morning if this is possible  -Family elected to change pt's code status to chemical code only.      CODE STATUS: Partial Code - chemical  code   Consults: Urology, Nephrology, Cardiology  Access: Peripheral  Drains: suprapubic catheter replaced 1/5/24   Diet: Diet NPO  Fluids: none  Pressors: None   Oxygenation: CPAP w Fio2 60  DVT Prophylaxis: Heparin  GI Prophylaxis: none  I&Os: pending 01/05 0701 - 01/06 0700  In: 578.8 [I.V.:278.9]  Out: 880 [Urine:880]        32 minutes of critical care was time spent personally by me on the following activities: development of treatment plan with patient or surrogate and bedside caregivers, discussions with consultants, evaluation of patient's response to treatment, examination of patient, ordering and performing treatments and interventions, ordering and review of laboratory studies, ordering and review of radiographic studies, pulse oximetry, re-evaluation of patient's condition.  This critical care time did not overlap with that of any other provider or involve time for any procedures.     Yogesh Holman MD  Pulmonary Critical Care Medicine  [facility] - [address] [facility] [address]   DOS: 01/06/2024

## 2024-01-06 NOTE — PROGRESS NOTES
.UROLOGY  PROGRESS  NOTE    Francis Montero 3/1/1929  [id number]  1/6/2024    BP soft, afebrile  475 mL UO overnight      Intake/Output:  I/O this shift:  In: -   Out: 60 [Urine:60]  I/O last 3 completed shifts:  In: 1384 [I.V.:683.9; IV Piggyback:700.1]  Out: 1385 [Urine:1385]       Exam:    NAD  Card RRR  Resp unlabored  Abd soft, NTND   brooks yellow urine in  bag  Extremity no C/C/E      Recent Results (from the past 24 hour(s))   Urinalysis, Reflex to Urine Culture    Collection Time: 01/05/24  6:00 PM    Specimen: Urine   Result Value Ref Range    Color, UA Light-Yellow Yellow, Light-Yellow, Colorless, Straw, Dark-Yellow    Appearance, UA Turbid (A) Clear    Specific Gravity, UA 1.019 1.005 - 1.030    pH, UA 5.5 5.0 - 8.5    Protein, UA 1+ (A) Negative    Glucose, UA Normal Negative, Normal    Ketones, UA Negative Negative    Blood, UA 2+ (A) Negative    Bilirubin, UA Negative Negative    Urobilinogen, UA Normal 0.2, 1.0, Normal    Nitrites, UA Negative Negative    Leukocyte Esterase,  (A) Negative    WBC, UA 21-50 (A) None Seen, 0-2, 3-5, 0-5 /HPF    Bacteria, UA Trace None Seen, Trace /HPF    Squamous Epithelial Cells, UA None Seen None Seen /HPF    RBC, UA 50-99 (A) None Seen, 0-2, 3-5, 0-5 /HPF   Urine culture    Collection Time: 01/05/24  6:00 PM    Specimen: Urine   Result Value Ref Range    Urine Culture No Growth At 24 Hours    APTT    Collection Time: 01/06/24  1:59 AM   Result Value Ref Range    PTT 35.0 (H) 23.2 - 33.7 seconds   CBC with Differential    Collection Time: 01/06/24  1:59 AM   Result Value Ref Range    WBC 8.42 4.50 - 11.50 x10(3)/mcL    RBC 4.72 4.70 - 6.10 x10(6)/mcL    Hgb 12.4 (L) 14.0 - 18.0 g/dL    Hct 39.7 (L) 42.0 - 52.0 %    MCV 84.1 80.0 - 94.0 fL    MCH 26.3 (L) 27.0 - 31.0 pg    MCHC 31.2 (L) 33.0 - 36.0 g/dL    RDW 16.0 11.5 - 17.0 %    Platelet 92 (L) 130 - 400 x10(3)/mcL    MPV 11.9 (H) 7.4 - 10.4 fL    Neut % 87.9 %    Lymph % 7.4 %    Mono % 3.3 %    Eos % 0.1 %     Basophil % 0.6 %    Lymph # 0.62 0.6 - 4.6 x10(3)/mcL    Neut # 7.40 2.1 - 9.2 x10(3)/mcL    Mono # 0.28 0.1 - 1.3 x10(3)/mcL    Eos # 0.01 0 - 0.9 x10(3)/mcL    Baso # 0.05 <=0.2 x10(3)/mcL    IG# 0.06 (H) 0 - 0.04 x10(3)/mcL    IG% 0.7 %    NRBC% 0.0 %    IPF 5.9 0.9 - 11.2 %         Assessment:  -UTI with chronic ureteral stent and SPT - Cultures with ESBL klebsiella  -bacteremia, sepsis - cultures with streptococcus  -acute on chronic respiratory failure  -STEPHEN on CKD  -h/o prostate cancer with stricture s/t radiation requiring chronic ureteral stent      Plan:  SP tube exchanged and draining well. Continue medical management. No further  recs currently. Please re consult if necessary.    NADIR Arvizu

## 2024-01-07 LAB
ALBUMIN SERPL-MCNC: 1.8 G/DL (ref 3.4–4.8)
ALBUMIN/GLOB SERPL: 0.6 RATIO (ref 1.1–2)
ALLENS TEST BLOOD GAS (OHS): YES
ALP SERPL-CCNC: 80 UNIT/L (ref 40–150)
ALT SERPL-CCNC: 11 UNIT/L (ref 0–55)
APTT PPP: 35.4 SECONDS (ref 23.2–33.7)
AST SERPL-CCNC: 30 UNIT/L (ref 5–34)
BASE EXCESS BLD CALC-SCNC: -2.5 MMOL/L (ref -2–2)
BASOPHILS # BLD AUTO: 0.02 X10(3)/MCL
BASOPHILS NFR BLD AUTO: 0.2 %
BILIRUB SERPL-MCNC: 0.5 MG/DL
BLOOD GAS SAMPLE TYPE (OHS): ABNORMAL
BUN SERPL-MCNC: 95 MG/DL (ref 8.4–25.7)
CA-I BLD-SCNC: 1.34 MMOL/L (ref 1.12–1.23)
CALCIUM SERPL-MCNC: 9 MG/DL (ref 8.8–10)
CHLORIDE SERPL-SCNC: 113 MMOL/L (ref 98–111)
CO2 BLDA-SCNC: 24.5 MMOL/L
CO2 SERPL-SCNC: 20 MMOL/L (ref 23–31)
COHGB MFR BLDA: 0 % (ref 0.5–1.5)
CPAP BLOOD GAS (OHS): 10 CM H2O
CREAT SERPL-MCNC: 2.81 MG/DL (ref 0.73–1.18)
DRAWN BY BLOOD GAS (OHS): ABNORMAL
EOSINOPHIL # BLD AUTO: 0.02 X10(3)/MCL (ref 0–0.9)
EOSINOPHIL NFR BLD AUTO: 0.2 %
ERYTHROCYTE [DISTWIDTH] IN BLOOD BY AUTOMATED COUNT: 16.3 % (ref 11.5–17)
GFR SERPLBLD CREATININE-BSD FMLA CKD-EPI: 20 MLS/MIN/1.73/M2
GLOBULIN SER-MCNC: 3.2 GM/DL (ref 2.4–3.5)
GLUCOSE SERPL-MCNC: 100 MG/DL (ref 75–121)
HCO3 BLDA-SCNC: 23.2 MMOL/L (ref 22–26)
HCT VFR BLD AUTO: 38.8 % (ref 42–52)
HGB BLD-MCNC: 11.8 G/DL (ref 14–18)
IMM GRANULOCYTES # BLD AUTO: 0.08 X10(3)/MCL (ref 0–0.04)
IMM GRANULOCYTES NFR BLD AUTO: 0.9 %
INHALED O2 CONCENTRATION: 60 %
LYMPHOCYTES # BLD AUTO: 0.66 X10(3)/MCL (ref 0.6–4.6)
LYMPHOCYTES NFR BLD AUTO: 7.7 %
MCH RBC QN AUTO: 26.3 PG (ref 27–31)
MCHC RBC AUTO-ENTMCNC: 30.4 G/DL (ref 33–36)
MCV RBC AUTO: 86.4 FL (ref 80–94)
METHGB MFR BLDA: 0.2 % (ref 0.4–1.5)
MODE (OHS): ABNORMAL
MONOCYTES # BLD AUTO: 0.24 X10(3)/MCL (ref 0.1–1.3)
MONOCYTES NFR BLD AUTO: 2.8 %
NEUTROPHILS # BLD AUTO: 7.55 X10(3)/MCL (ref 2.1–9.2)
NEUTROPHILS NFR BLD AUTO: 88.2 %
NRBC BLD AUTO-RTO: 0 %
O2 HB BLOOD GAS (OHS): 91.6 % (ref 94–97)
OXYHGB MFR BLDA: 11.2 G/DL (ref 12–16)
PCO2 BLDA: 43 MMHG (ref 35–45)
PH BLDA: 7.34 [PH] (ref 7.35–7.45)
PLATELET # BLD AUTO: 89 X10(3)/MCL (ref 130–400)
PMV BLD AUTO: 11.3 FL (ref 7.4–10.4)
PO2 BLDA: 64 MMHG (ref 80–100)
POTASSIUM BLOOD GAS (OHS): 3.4 MMOL/L (ref 3.5–5)
POTASSIUM SERPL-SCNC: 3.6 MMOL/L (ref 3.5–5.1)
PROT SERPL-MCNC: 5 GM/DL (ref 5.8–7.6)
RBC # BLD AUTO: 4.49 X10(6)/MCL (ref 4.7–6.1)
SAMPLE SITE BLOOD GAS (OHS): ABNORMAL
SAO2 % BLDA: 90.6 %
SODIUM BLOOD GAS (OHS): 140 MMOL/L (ref 137–145)
SODIUM SERPL-SCNC: 146 MMOL/L (ref 132–146)
WBC # SPEC AUTO: 8.57 X10(3)/MCL (ref 4.5–11.5)

## 2024-01-07 PROCEDURE — 85025 COMPLETE CBC W/AUTO DIFF WBC: CPT | Performed by: NURSE PRACTITIONER

## 2024-01-07 PROCEDURE — 63600175 PHARM REV CODE 636 W HCPCS: Performed by: INTERNAL MEDICINE

## 2024-01-07 PROCEDURE — 25000003 PHARM REV CODE 250: Performed by: INTERNAL MEDICINE

## 2024-01-07 PROCEDURE — 20000000 HC ICU ROOM

## 2024-01-07 PROCEDURE — 82803 BLOOD GASES ANY COMBINATION: CPT

## 2024-01-07 PROCEDURE — 36600 WITHDRAWAL OF ARTERIAL BLOOD: CPT

## 2024-01-07 PROCEDURE — 25000003 PHARM REV CODE 250: Performed by: NURSE PRACTITIONER

## 2024-01-07 PROCEDURE — 99900035 HC TECH TIME PER 15 MIN (STAT)

## 2024-01-07 PROCEDURE — 27100171 HC OXYGEN HIGH FLOW UP TO 24 HOURS

## 2024-01-07 PROCEDURE — 85730 THROMBOPLASTIN TIME PARTIAL: CPT | Performed by: NURSE PRACTITIONER

## 2024-01-07 PROCEDURE — 94761 N-INVAS EAR/PLS OXIMETRY MLT: CPT

## 2024-01-07 PROCEDURE — 27000207 HC ISOLATION

## 2024-01-07 PROCEDURE — 80053 COMPREHEN METABOLIC PANEL: CPT | Performed by: INTERNAL MEDICINE

## 2024-01-07 PROCEDURE — 94660 CPAP INITIATION&MGMT: CPT

## 2024-01-07 PROCEDURE — A4216 STERILE WATER/SALINE, 10 ML: HCPCS | Performed by: INTERNAL MEDICINE

## 2024-01-07 PROCEDURE — 99900031 HC PATIENT EDUCATION (STAT)

## 2024-01-07 RX ADMIN — GABAPENTIN 300 MG: 300 CAPSULE ORAL at 08:01

## 2024-01-07 RX ADMIN — AMIODARONE HYDROCHLORIDE 400 MG: 200 TABLET ORAL at 08:01

## 2024-01-07 RX ADMIN — CALCITRIOL CAPSULES 0.25 MCG 0.25 MCG: 0.25 CAPSULE ORAL at 08:01

## 2024-01-07 RX ADMIN — ATORVASTATIN CALCIUM 10 MG: 10 TABLET, FILM COATED ORAL at 08:01

## 2024-01-07 RX ADMIN — Medication 6 MG: at 08:01

## 2024-01-07 RX ADMIN — SODIUM CHLORIDE, PRESERVATIVE FREE 10 ML: 5 INJECTION INTRAVENOUS at 06:01

## 2024-01-07 RX ADMIN — FAMOTIDINE 20 MG: 10 INJECTION, SOLUTION INTRAVENOUS at 08:01

## 2024-01-07 RX ADMIN — MEROPENEM 1 G: 1 INJECTION, POWDER, FOR SOLUTION INTRAVENOUS at 05:01

## 2024-01-07 RX ADMIN — ACETAMINOPHEN 325MG 650 MG: 325 TABLET ORAL at 02:01

## 2024-01-07 RX ADMIN — SODIUM CHLORIDE, PRESERVATIVE FREE 10 ML: 5 INJECTION INTRAVENOUS at 05:01

## 2024-01-07 RX ADMIN — MEROPENEM 1 G: 1 INJECTION, POWDER, FOR SOLUTION INTRAVENOUS at 04:01

## 2024-01-07 RX ADMIN — SODIUM CHLORIDE, PRESERVATIVE FREE 10 ML: 5 INJECTION INTRAVENOUS at 11:01

## 2024-01-07 RX ADMIN — MUPIROCIN: 20 OINTMENT TOPICAL at 08:01

## 2024-01-07 NOTE — PROGRESS NOTES
[facility] - [facility]    Cardiology  Progress Note    Patient Name: Francis Montero  MRN: [id number]  Admission Date: 1/2/2024  Hospital Length of Stay: 5 days  Code Status: Partial Code   Attending Physician: Gurdeep Shah MD   Primary Care Physician: Lisa, Primary Doctor  Expected Discharge Date:   Principal Problem:SOB (shortness of breath)    Subjective:   Consultation Reason: Abnormal Troponin & AF RVR     HPI:   Mr. Montero is a 89 y/o male,  unknown to [facility] (Followed by Dr. Major in [place]), who presented to Ed with c/o SOB. Admit VS- BP 70/30, P RA sat 85%. He was given 200ml NS enroute. In Ed he was given another 1.5L NS bolus with improvement in BP. Lab significant for WBC 13.89, K+ 5.7, CO2 16, BUN/creatinine 83.4/3.10, Mg 1.50, BNP 1970, troponin 0.236, Venous lactate 3.3. EKG SR with PACs. CXR consistent with pulmonary congestion and a large bilateral pleural effusions. Echo revealing EF 60-65% with mild to  moderate AS, mild MS and mild to moderate TR. Blood and urine cultures obtained and patient started on antibiotics. [facility] has been consulted for elevated troponin    Hospital Course:  1.3.24: NAD Noted. On BIPAP. He is awake but confused, requiring Mittens. AF RVR. On Amiodarone Infusion, He has a history of AF. Hypotensive Requiring Levophed Support. Legs are Warm.    1.4.24: BIPAP Support. Awake. AF RVR. Requiring Levophed Support. On Amiodarone Infusion. On Heparin Infusion for Stroke Risk Reduction Related to AF.   1.5.24: BIPAP. AF RVR. Remains on Levophed. Septic status noted. Family at bedside. Thrombocytopenia noted, Will discontinue Heparin. Also will discontinue Amiodarone Infusion.   1.6.24: NAD. Marginal BP. CPAP. AFIB CVR. On Pressors. Thrombocytopenia worse.   1.7.24: NAD. BP improved - currently off pressures. On CPAP. AFIB currently CVR. Thrombocytopenia continues to worse. Hypothermia per documentation has improved     PMH: Afib/Eliquis, COPD, asbestosis, Merkel cell  carcinoma of left ear anxiety, esophagitis, esophageal varices, hiatal hernia, merkel cell carcinoma, prostate cancer, chronic UTIs, HLD, PUD  PSH: surgical resection with adjuvant radiotherapy to Left ear,  EGD, prostate surgery,  open shoulder rotator cuff repair  Family History:   Social History: former  smoker 40 pack years,      Previous Cardiac Diagnostics:   Echocardiogram (1.2.24):  Left Ventricle: The left ventricle is normal in size. Normal wall thickness. Normal wall motion. There is normal systolic function with a visually estimated ejection fraction of 60 - 65%. Diastolic function cannot be reliably determined in the presence of mitral valve disease. Inconclusive left ventricular filling pressure.  Right Ventricle: Normal right ventricular cavity size. Systolic function is normal.  Aortic Valve: There is mild to moderate stenosis. Aortic valve area by VTI is 1.23 cm². Aortic valve peak velocity is 2.63 m/s. Mean gradient is 18 mmHg. The dimensionless index is 0.39. There is trace aortic regurgitation.  Mitral Valve: There is severe mitral annular calcification present. There is mild stenosis. The mean pressure gradient across the mitral valve is 6 mmHg at a heart rate of  bpm.  Tricuspid Valve: There is mild to moderate regurgitation. The estimated PA systolic pressure is at least 38 mmHg.     Echocardiogram (2.22.22):  Left Ventricle: Left ventricle is normal in size and function. Normal   wall thickness.   Right Ventricle: Right ventricle is normal in size and function. Normal   wall thickness.   Mitral Valve: Mitral valve is normal in structure and function. Mildly   calcified leaflets.   Tricuspid Valve: Tricuspid valve is normal size and function.   Aortic Valve: Aortic valve is normal in structure and function.   Moderately calcified cusps.      Review of patient's allergies indicates:  No Known Allergies    Review of Systems   Unable to perform ROS: Acuity of condition     Objective:     Vital  Signs (Most Recent):  Temp: (!) 95 °F (35 °C) (01/07/24 0400)  Pulse: (!) 52 (01/07/24 0600)  Resp: (!) 22 (01/07/24 0600)  BP: 108/65 (01/07/24 0600)  SpO2: 95 % (01/07/24 0600) Vital Signs (24h Range):  Temp:  [94 °F (34.4 °C)-97.6 °F (36.4 °C)] 95 °F (35 °C)  Pulse:  [43-82] 52  Resp:  [15-25] 22  SpO2:  [86 %-100 %] 95 %  BP: ()/(52-80) 108/65     Weight: 72.5 kg (159 lb 13.3 oz)  Body mass index is 26.6 kg/m².    SpO2: 95 %         Intake/Output Summary (Last 24 hours) at 1/7/2024 0914  Last data filed at 1/7/2024 0624  Gross per 24 hour   Intake 317.87 ml   Output 495 ml   Net -177.13 ml         Lines/Drains/Airways       Peripherally Inserted Central Catheter Line  Duration             PICC Triple Lumen 01/04/24 1725 left basilic 2 days              Drain  Duration                  NG/OG Tube 01/05/24 1500 [place] sump 16 Fr. Left nostril 1 day         Suprapubic Catheter 01/05/24 1720 24 Fr. 1 day              Peripheral Intravenous Line  Duration                  Peripheral IV - Single Lumen 01/02/24 1006 18 G Right Antecubital 4 days         Peripheral IV - Single Lumen 01/04/24 1459 20 G Anterior;Left;Proximal Forearm 2 days                  Significant Labs:   Recent Results (from the past 72 hour(s))   APTT    Collection Time: 01/05/24  1:24 AM   Result Value Ref Range    PTT 67.6 (H) 23.2 - 33.7 seconds   Comprehensive Metabolic Panel    Collection Time: 01/05/24  1:24 AM   Result Value Ref Range    Sodium Level 141 132 - 146 mmol/L    Potassium Level 3.9 3.5 - 5.1 mmol/L    Chloride 110 98 - 111 mmol/L    Carbon Dioxide 19 (L) 23 - 31 mmol/L    Glucose Level 114 75 - 121 mg/dL    Blood Urea Nitrogen 86.3 (H) 8.4 - 25.7 mg/dL    Creatinine 2.63 (H) 0.73 - 1.18 mg/dL    Calcium Level Total 9.6 8.8 - 10.0 mg/dL    Protein Total 5.9 5.8 - 7.6 gm/dL    Albumin Level 1.8 (L) 3.4 - 4.8 g/dL    Globulin 4.1 (H) 2.4 - 3.5 gm/dL    Albumin/Globulin Ratio 0.4 (L) 1.1 - 2.0 ratio    Bilirubin Total 0.4 <=1.5  mg/dL    Alkaline Phosphatase 80 40 - 150 unit/L    Alanine Aminotransferase 15 0 - 55 unit/L    Aspartate Aminotransferase 34 5 - 34 unit/L    eGFR 22 mls/min/1.73/m2   CBC with Differential    Collection Time: 01/05/24  1:24 AM   Result Value Ref Range    WBC 9.49 4.50 - 11.50 x10(3)/mcL    RBC 4.52 (L) 4.70 - 6.10 x10(6)/mcL    Hgb 12.0 (L) 14.0 - 18.0 g/dL    Hct 37.3 (L) 42.0 - 52.0 %    MCV 82.5 80.0 - 94.0 fL    MCH 26.5 (L) 27.0 - 31.0 pg    MCHC 32.2 (L) 33.0 - 36.0 g/dL    RDW 15.8 11.5 - 17.0 %    Platelet 99 (L) 130 - 400 x10(3)/mcL    MPV 11.1 (H) 7.4 - 10.4 fL    NRBC% 0.0 %    IPF 5.7 0.9 - 11.2 %   Manual Differential    Collection Time: 01/05/24  1:24 AM   Result Value Ref Range    WBC 9.49 x10(3)/mcL    Neutrophils % 90 %    Lymphs % 6 %    Monocytes % 4 %    Neutrophils Abs 8.541 2.1 - 9.2 x10(3)/mcL    Lymphs Abs 0.5694 (L) 0.6 - 4.6 x10(3)/mcL    Monocytes Abs 0.3796 0.1 - 1.3 x10(3)/mcL    Platelets Decreased (A) Normal, Adequate    RBC Morph Normal Normal   Urinalysis, Reflex to Urine Culture    Collection Time: 01/05/24  6:00 PM    Specimen: Urine   Result Value Ref Range    Color, UA Light-Yellow Yellow, Light-Yellow, Colorless, Straw, Dark-Yellow    Appearance, UA Turbid (A) Clear    Specific Gravity, UA 1.019 1.005 - 1.030    pH, UA 5.5 5.0 - 8.5    Protein, UA 1+ (A) Negative    Glucose, UA Normal Negative, Normal    Ketones, UA Negative Negative    Blood, UA 2+ (A) Negative    Bilirubin, UA Negative Negative    Urobilinogen, UA Normal 0.2, 1.0, Normal    Nitrites, UA Negative Negative    Leukocyte Esterase,  (A) Negative    WBC, UA 21-50 (A) None Seen, 0-2, 3-5, 0-5 /HPF    Bacteria, UA Trace None Seen, Trace /HPF    Squamous Epithelial Cells, UA None Seen None Seen /HPF    RBC, UA 50-99 (A) None Seen, 0-2, 3-5, 0-5 /HPF   Urine culture    Collection Time: 01/05/24  6:00 PM    Specimen: Urine   Result Value Ref Range    Urine Culture No Growth At 24 Hours    APTT    Collection  Time: 01/06/24  1:59 AM   Result Value Ref Range    PTT 35.0 (H) 23.2 - 33.7 seconds   CBC with Differential    Collection Time: 01/06/24  1:59 AM   Result Value Ref Range    WBC 8.42 4.50 - 11.50 x10(3)/mcL    RBC 4.72 4.70 - 6.10 x10(6)/mcL    Hgb 12.4 (L) 14.0 - 18.0 g/dL    Hct 39.7 (L) 42.0 - 52.0 %    MCV 84.1 80.0 - 94.0 fL    MCH 26.3 (L) 27.0 - 31.0 pg    MCHC 31.2 (L) 33.0 - 36.0 g/dL    RDW 16.0 11.5 - 17.0 %    Platelet 92 (L) 130 - 400 x10(3)/mcL    MPV 11.9 (H) 7.4 - 10.4 fL    Neut % 87.9 %    Lymph % 7.4 %    Mono % 3.3 %    Eos % 0.1 %    Basophil % 0.6 %    Lymph # 0.62 0.6 - 4.6 x10(3)/mcL    Neut # 7.40 2.1 - 9.2 x10(3)/mcL    Mono # 0.28 0.1 - 1.3 x10(3)/mcL    Eos # 0.01 0 - 0.9 x10(3)/mcL    Baso # 0.05 <=0.2 x10(3)/mcL    IG# 0.06 (H) 0 - 0.04 x10(3)/mcL    IG% 0.7 %    NRBC% 0.0 %    IPF 5.9 0.9 - 11.2 %   APTT    Collection Time: 01/07/24  2:59 AM   Result Value Ref Range    PTT 35.4 (H) 23.2 - 33.7 seconds   CBC with Differential    Collection Time: 01/07/24  2:59 AM   Result Value Ref Range    WBC 8.57 4.50 - 11.50 x10(3)/mcL    RBC 4.49 (L) 4.70 - 6.10 x10(6)/mcL    Hgb 11.8 (L) 14.0 - 18.0 g/dL    Hct 38.8 (L) 42.0 - 52.0 %    MCV 86.4 80.0 - 94.0 fL    MCH 26.3 (L) 27.0 - 31.0 pg    MCHC 30.4 (L) 33.0 - 36.0 g/dL    RDW 16.3 11.5 - 17.0 %    Platelet 89 (L) 130 - 400 x10(3)/mcL    MPV 11.3 (H) 7.4 - 10.4 fL    Neut % 88.2 %    Lymph % 7.7 %    Mono % 2.8 %    Eos % 0.2 %    Basophil % 0.2 %    Lymph # 0.66 0.6 - 4.6 x10(3)/mcL    Neut # 7.55 2.1 - 9.2 x10(3)/mcL    Mono # 0.24 0.1 - 1.3 x10(3)/mcL    Eos # 0.02 0 - 0.9 x10(3)/mcL    Baso # 0.02 <=0.2 x10(3)/mcL    IG# 0.08 (H) 0 - 0.04 x10(3)/mcL    IG% 0.9 %    NRBC% 0.0 %   Comprehensive Metabolic Panel    Collection Time: 01/07/24  2:59 AM   Result Value Ref Range    Sodium Level 146 132 - 146 mmol/L    Potassium Level 3.6 3.5 - 5.1 mmol/L    Chloride 113 (H) 98 - 111 mmol/L    Carbon Dioxide 20 (L) 23 - 31 mmol/L    Glucose Level  100 75 - 121 mg/dL    Blood Urea Nitrogen 95.0 (H) 8.4 - 25.7 mg/dL    Creatinine 2.81 (H) 0.73 - 1.18 mg/dL    Calcium Level Total 9.0 8.8 - 10.0 mg/dL    Protein Total 5.0 (L) 5.8 - 7.6 gm/dL    Albumin Level 1.8 (L) 3.4 - 4.8 g/dL    Globulin 3.2 2.4 - 3.5 gm/dL    Albumin/Globulin Ratio 0.6 (L) 1.1 - 2.0 ratio    Bilirubin Total 0.5 <=1.5 mg/dL    Alkaline Phosphatase 80 40 - 150 unit/L    Alanine Aminotransferase 11 0 - 55 unit/L    Aspartate Aminotransferase 30 5 - 34 unit/L    eGFR 20 mls/min/1.73/m2     Telemetry:  AF RVR    Physical Exam  Vitals and nursing note reviewed.   Constitutional:       General: He is not in acute distress.     Appearance: He is ill-appearing.   HENT:      Head: Normocephalic.   Cardiovascular:      Rate and Rhythm: Tachycardia present. Rhythm irregular.   Pulmonary:      Effort: Pulmonary effort is normal. No respiratory distress.      Comments: Right Posterior Breath Sounds are Diminished. BIPAP/CPAP 60% FIO2  Musculoskeletal:      Cervical back: Neck supple.      Right lower leg: No edema.      Left lower leg: No edema.      Comments: Legs are Warm   Skin:     General: Skin is warm and dry.   Neurological:      Mental Status: He is alert. He is confused.      Comments: Awake/Alert with Intermittent Confusion.   Psychiatric:      Comments: Mittens         Current Inpatient Medications:  Current Facility-Administered Medications:     0.9%  NaCl infusion, , Intravenous, Continuous, Bernardino Ordoñez MD, Last Rate: 10 mL/hr at 01/07/24 0624, Rate Verify at 01/07/24 0624    acetaminophen tablet 1,000 mg, 1,000 mg, Oral, Q6H PRN, Grisel Valentine FNP, 1,000 mg at 01/02/24 1630    acetaminophen tablet 650 mg, 650 mg, Oral, Q4H PRN, Grisel Valentine FNP, 650 mg at 01/06/24 1729    albuterol nebulizer solution, 10 mg/hr, Nebulization, Continuous, Mendoza Mckeon MD, Last Rate: 12 mL/hr at 01/02/24 1256, 10 mg/hr at 01/02/24 1256    albuterol-ipratropium 2.5 mg-0.5 mg/3 mL  nebulizer solution 3 mL, 3 mL, Nebulization, Q4H PRN, Grisel Valentine FNP, 3 mL at 01/03/24 0229    amiodarone tablet 400 mg, 400 mg, Oral, BID, 400 mg at 01/07/24 0839 **FOLLOWED BY** [START ON 1/8/2024] amiodarone tablet 200 mg, 200 mg, Oral, BID **FOLLOWED BY** [START ON 1/11/2024] amiodarone tablet 200 mg, 200 mg, Oral, Daily, Charles Taveras, NADIR    atorvastatin tablet 10 mg, 10 mg, Oral, Daily, Grisel Valentine FNP, 10 mg at 01/07/24 0839    calcitRIOL capsule 0.25 mcg, 0.25 mcg, Per NG tube, Daily, Gurdeep Shah MD, 0.25 mcg at 01/07/24 0839    famotidine (PF) injection 20 mg, 20 mg, Intravenous, Daily, Gurdeep Shah MD, 20 mg at 01/07/24 0839    gabapentin capsule 300 mg, 300 mg, Oral, QHS, Grisel Valentine FNP, 300 mg at 01/06/24 2017    melatonin tablet 6 mg, 6 mg, Oral, Nightly PRN, Grisel Valentine FNP, 6 mg at 01/06/24 2017    meropenem (MERREM) 1 g in sodium chloride 0.9 % 100 mL IVPB (MB+), 1 g, Intravenous, Q12H, Bernardino Ordoñez MD, Stopped at 01/07/24 0613    metoprolol injection 5 mg, 5 mg, Intravenous, Q5 Min PRN, Wendy Gabriel FNP    morphine injection 1 mg, 1 mg, Intravenous, Q8H PRN, Thomas Palacios MD, 1 mg at 01/05/24 0053    naloxone 0.4 mg/mL injection 0.02 mg, 0.02 mg, Intravenous, PRN, Grisel Valentine FNP    NORepinephrine 8 mg in dextrose 5% 250 mL infusion, 0-3 mcg/kg/min, Intravenous, Continuous, Fadumo Ramirez DO, Stopped at 01/06/24 0434    ondansetron injection 4 mg, 4 mg, Intravenous, Q4H PRN, Grisel Valentine FNP    prochlorperazine injection Soln 5 mg, 5 mg, Intravenous, Q6H PRN, Grisel Valentine FNP    simethicone chewable tablet 80 mg, 1 tablet, Oral, QID PRN, Grisel Valentine FNP    sodium chloride 0.9% flush 10 mL, 10 mL, Intravenous, PRN, Grisel Valentine FNP    sodium chloride 0.9% flush 10 mL, 10 mL, Intravenous, PRN, Manny Sawant, DO    Flushing PICC/Midline Protocol, , , Until  Discontinued **AND** sodium chloride 0.9% flush 10 mL, 10 mL, Intravenous, Q6H, 10 mL at 01/07/24 0514 **AND** sodium chloride 0.9% flush 10 mL, 10 mL, Intravenous, PRN, Bernardino Ordoñez MD    VTE Risk Mitigation (From admission, onward)           Ordered     Reason for No Pharmacological VTE Prophylaxis  Once        Question:  Reasons:  Answer:  Already adequately anticoagulated on oral Anticoagulants    01/02/24 1329     IP VTE HIGH RISK PATIENT  Once         01/02/24 1329     Place sequential compression device  Until discontinued         01/02/24 1329                  Assessment:   Acute on Chronic Diastolic Heart Failure    - EF 60-65%  Atrial Fibrillation (Unspecified)- Now with CVR    - [id number]: 4    - On Eliquis Outpatient- Heparin Infusion Discontinued due to   Hypotension requiring Pressors - currently stable off pressures   Thrombocytopenia (Worsening)  CAP  Bilateral Pleural Effusion (Right > Left)- ? Right Empyema     - ID Following   Acute Respiratory Failure requiring CPAP   NSTEMI- Type II in the Setting of Heart Failure & Septic Shock  Septic Shock Requiring Vasopressor Support    - Strept Bacteremia/Urine Culture Positive for Klebsiella Pneumoniae   Valvular Heart Disease    - AS: Mild to Moderate, TR: Mild to Moderate, MS: Mild with Severe MAC  Acute Kidney Failure  Lactic Acidosis (Resolved)  Confusion    - CT Head Normal  UTI    - Chronic Ureteral Stent  Hyperlipidemia    - On Statin   History of Esophageal Varices  COPD  History of Merkel Cell Carcinoma on Ear  History of Prostate Cancer  COVID-19 & Influenza Negative on 1.2.24  No known History of GI Bleed    Plan:   Continue Amiodarone Taper and Statin   Antibiotic Management as per ID Team  PRN Lopressor 5 mg for Sustained HR > 120  Supportive Care as per ICU Team.   Labs in AM: CBC, BMP, and Mag     NADIR Escobedo  Cardiology  [facility] - [facility]  01/07/2024     Physician addendum:  I have seen and examined this  patient as a split-shared visit with the DAYANA d/t complicated medical management of above problems written in assessment and high acuity requiring physician expertise in medical decision-making. I performed the substantive portion of the history and exam. Above medical decision-making is also formulated by me.    Cardiovascular exam:  S1, S2  Lungs:  fine crackles at bases.  Extremities:  1+ edema bilaterally    Plan:  Continue amiodarone taper and statins.  Heart rate is better today.  No beta-blocker given.  Continue being treated for lung infection.      Mukund Melo MD  Cardiologist

## 2024-01-07 NOTE — PROGRESS NOTES
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  Patient was originally started on IV antibiotics however during his stay began to become progressively hypotensive.  Patient received 3 L fluid boluses which did not improve patient's blood pressure to a map greater than 65 at which point patient was initiated on Levophed for hemodynamic support.  Of note, patient is visiting [place] and reports that he has been feeling somewhat ill the last 2 days.  Admits to dysuria, fatigue, myalgia, shortness a breath.  Reports no chest pain, palpitations, fevers, chills, nausea, vomiting.  ICU was consulted for upgrade.    Hospital Course/Significant events:  01/01/2024:  Admitted to [facility]  01/02/2024:  Upgraded to ICU, initiated on Levophed  01/04/2024: PICC line placed   01/05/2024: Suprapubic catheter exchanged    24 Hour Interval History:  No acute events occurred overnight. Vital signs remained stable and patient is afebrile. Patient is now off of pressors since 4:30AM. Currently on CPAP with FiO2 of 60. Urine output of 535cc over last 24 hours. Urology exchanged suprapubic catheter yesterday. Awaiting CT scan of abdomen to r/o abscess which was not completed yesterday. Repeat blood cultures and respiratory culture pending, repeat urine culture negative for growth at 24 hours. Renal u/s did not show any acute  concerns. Cardiology transitioned amio drip to oral taper. Labs appear stable, elevated PT and INR improving. Initiated on tube feeds.     Past Medical History:   Diagnosis Date    CKD (chronic kidney disease)     COPD (chronic obstructive pulmonary disease)     HLD (hyperlipidemia)     HTN (hypertension)     Merkel cell carcinoma     Paroxysmal A-fib     Prostate cancer     PUD (peptic ulcer disease)      Past Surgical History:   Procedure Laterality Date    INGUINAL HERNIA REPAIR      PROSTATE SURGERY      ROTATOR CUFF REPAIR       Social History     Socioeconomic History    Marital status:      Social Determinants of Health     Housing Stability: Unknown (1/3/2024)    Housing Stability Vital Sign     Unable to Pay for Housing in the Last Year: No     Current Outpatient Medications   Medication Instructions    atorvastatin (LIPITOR) 10 mg, Oral, Daily    calcitRIOL (ROCALTROL) 0.25 mcg, Oral, Daily, Takes every other day    gabapentin (NEURONTIN) 300 mg, Oral, Nightly    metoprolol tartrate (LOPRESSOR) 25 mg, Oral, 2 times daily    NON FORMULARY MEDICATION 850 mg, Oral, Daily, Umary     Current Inpatient Medications   amiodarone  400 mg Oral BID    Followed by    [START ON 1/8/2024] amiodarone  200 mg Oral BID    Followed by    [START ON 1/11/2024] amiodarone  200 mg Oral Daily    atorvastatin  10 mg Oral Daily    calcitRIOL  0.25 mcg Per NG tube Daily    famotidine (PF)  20 mg Intravenous Daily    gabapentin  300 mg Oral QHS    meropenem (MERREM) IVPB  1 g Intravenous Q12H    mupirocin   Nasal BID    sodium chloride 0.9%  10 mL Intravenous Q6H     Current Intravenous Infusions   sodium chloride 0.9% 10 mL/hr at 01/06/24 1800    albuterol 0.083% 10 mg/hr (01/02/24 1256)    NORepinephrine bitartrate-D5W Stopped (01/06/24 0434)       Review of Systems   Constitutional:  Positive for chills and fever. Negative for weight loss.   HENT:  Negative for congestion and sinus pain.    Eyes:  Negative for blurred  vision.   Respiratory:  Negative for cough, hemoptysis and wheezing.    Cardiovascular:  Negative for chest pain and palpitations.   Gastrointestinal:  Negative for abdominal pain, nausea and vomiting.   Genitourinary:  Positive for dysuria and urgency. Negative for frequency and hematuria.   Musculoskeletal:  Positive for back pain and neck pain. Negative for myalgias.   Skin:  Negative for itching and rash.   Neurological:  Negative for dizziness, tingling, sensory change and headaches.      Objective:       Intake/Output Summary (Last 24 hours) at 1/7/2024 0330  Last data filed at 1/7/2024 0300  Gross per 24 hour   Intake 209.5 ml   Output 670 ml   Net -460.5 ml       Vital Signs (Most Recent):  Temp: (!) 94 °F (34.4 °C) (01/07/24 0000)  Pulse: 60 (01/07/24 0201)  Resp: 16 (01/07/24 0201)  BP: (!) 127/55 (01/07/24 0201)  SpO2: 95 % (01/07/24 0201)  Body mass index is 26.6 kg/m².  Weight: 72.5 kg (159 lb 13.3 oz) Vital Signs (24h Range):  Temp:  [94 °F (34.4 °C)-97.6 °F (36.4 °C)] 94 °F (34.4 °C)  Pulse:  [43-96] 60  Resp:  [15-34] 16  SpO2:  [86 %-100 %] 95 %  BP: ()/(52-83) 127/55     Physical Exam  Constitutional:       General: He is not in acute distress.     Appearance: He is ill-appearing.      Comments: Sleeping upon exam. Oxygen mask in proper place   HENT:      Head:      Comments: CPAP in place. PICC line placed, dressing intact.   Cardiovascular:      Rate and Rhythm: Tachycardia present.      Pulses: Normal pulses.      Heart sounds: Murmur heard.      No friction rub. No gallop.      Comments: Systolic murmur noted  Pulmonary:      Effort: Pulmonary effort is normal.      Breath sounds: No stridor. Rhonchi and rales present. No wheezing.   Abdominal:      General: Bowel sounds are normal.      Palpations: Abdomen is soft.      Tenderness: There is no abdominal tenderness. There is no guarding.   Musculoskeletal:         General: No swelling.      Right lower leg: No edema.      Left lower leg:  No edema.   Neurological:      General: No focal deficit present.      Mental Status: He is alert and oriented to person, place, and time.       Lines/Drains/Airways       Peripherally Inserted Central Catheter Line  Duration             PICC Triple Lumen 01/04/24 1725 left basilic 2 days              Drain  Duration                  NG/OG Tube 01/05/24 1500 Temple sump 16 Fr. Left nostril 1 day         Suprapubic Catheter 01/05/24 1720 24 Fr. 1 day              Peripheral Intravenous Line  Duration                  Peripheral IV - Single Lumen 01/02/24 1006 18 G Right Antecubital 4 days         Peripheral IV - Single Lumen 01/04/24 1459 20 G Anterior;Left;Proximal Forearm 2 days                    Significant Labs:  Lab Results   Component Value Date    WBC 8.57 01/07/2024    HGB 11.8 (L) 01/07/2024    HCT 38.8 (L) 01/07/2024    MCV 86.4 01/07/2024    PLT 89 (L) 01/07/2024     BMP  Lab Results   Component Value Date     01/05/2024    K 3.9 01/05/2024    CHLORIDE 110 01/05/2024    CO2 19 (L) 01/05/2024    BUN 86.3 (H) 01/05/2024    CREATININE 2.63 (H) 01/05/2024    CALCIUM 9.6 01/05/2024     ABG  Recent Labs   Lab 01/03/24  0735   PH 7.400   PO2 60.0*   PCO2 30.0*   HCO3 18.6*   POCBASEDEF -5.10       Mechanical Ventilation Support:  Oxygen Concentration (%): 50 (01/07/24 0201)    Significant Imaging:  Imaging Results              X-Ray Chest AP Portable (Final result)  Result time 01/02/24 21:00:06      Final result by Darrius Mcpherson MD (01/02/24 21:00:06)                   Impression:      Changes most consistent with pulmonary edema and a large right-sided pleural effusion.  This appears grossly stable from the previous exam      Electronically signed by: Darrius Mcpherson MD  Date:    01/02/2024  Time:    21:00               Narrative:    EXAMINATION:  XR CHEST AP PORTABLE    CLINICAL HISTORY:  Shortness of breath    TECHNIQUE:  Single frontal view of the chest was  performed.    COMPARISON:  01/02/2024    FINDINGS:  There is significant opacification of the right hemithorax cannot rule out a large effusion.  There are calcified pleural plaques suggestive of previous X best is exposure.  There are increased markings bilaterally most consistent with pulmonary edema                                       US Retroperitoneal Complete (Final result)  Result time 01/02/24 15:46:53      Final result by Soren Miranda MD (01/02/24 15:46:53)                   Impression:      1. No hydronephrosis.  2. Medical renal disease.      Electronically signed by: Soren Miranda  Date:    01/02/2024  Time:    15:46               Narrative:    EXAMINATION:  US RETROPERITONEAL COMPLETE    CLINICAL HISTORY:  elevated BUN/Creat;    COMPARISON:  None.    FINDINGS:  Grayscale and color Doppler sonographic evaluation of the kidneys and urinary bladder.    The right kidney measures 8 cm. The left kidney measures 8 cm.   No hydronephrosis.  Heterogeneous renal parenchymal echogenicity with loss of corticomedullary differentiation.    Bladder is not visualized.                                       CT Head Without Contrast (Final result)  Result time 01/02/24 15:12:29      Final result by Alejandrina Rodriguez MD (01/02/24 15:12:29)                   Impression:      1. No acute intracranial abnormality.  2. Chronic microvascular ischemic changes.      Electronically signed by: Alejandrina Rodriguez  Date:    01/02/2024  Time:    15:12               Narrative:    EXAMINATION:  CT HEAD WITHOUT CONTRAST    CLINICAL HISTORY:  Mental status change, unknown cause;    TECHNIQUE:  Axial scans were obtained from skull base to the vertex.    Coronal and sagittal reconstructions obtained from the axial data.    Automatic exposure control was utilized to limit radiation dose.    Contrast: None    Radiation Dose:    Total DLP: 1029 mGy*cm    COMPARISON:  None    FINDINGS:  There is no acute intracranial hemorrhage or edema.   There is a small area of encephalomalacia in the left cerebellum.    There is no mass effect or midline shift.  There is diffuse parenchymal volume loss.  The basal cisterns are patent. There is no abnormal extra-axial fluid collection.  Carotid artery calcifications are noted.    The calvarium and skull base are intact.  There are bilateral mastoid effusions, right greater than left.                                       X-Ray Chest 1 View (Final result)  Result time 01/02/24 10:38:49      Final result by Eligio Shaikh MD (01/02/24 10:38:49)                   Impression:      Changes suggestive of pulmonary vascular congestion and cardiac decompensation.    Slightly more confluent opacities at the right base infiltrate cannot be completely excluded.    Right-sided pleural effusion      Electronically signed by: Eligio Shaikh  Date:    01/02/2024  Time:    10:38               Narrative:    EXAMINATION:  XR CHEST 1 VIEW    CPT 29416    CLINICAL HISTORY:  SOB;    FINDINGS:  Examination reveals mediastinal silhouette to be within normal limits cardiac silhouette is not enlarged there is increase in interstitial and pulmonary vascular markings indicating some degree of pulmonary vascular congestion and cardiac decompensation.    Slightly more confluent opacities identified at the right base superimposed infiltrate can not be completely excluded.    There is blunting of the right costophrenic angle representing a right-sided pleural effusion    No other focal consolidative changes                                   I have reviewed the pertinent imaging within the past 24 hours.    Assessment/Plan:     Assessment  Septic Shock  Acute Cystitis without hematuria  -Hx of urinary retension   - hx of urethral stent  -initial urine culture positive for klebsiella pneumoniae  Community Acquired Pneumonia  Bacteremia -strep pneumoniae  Acute Hypoxic on chronic hypercarbic respiratory failure  - hx of COPD  - hx of  asbestosis, pulmonary fibrosis?  Right pleural effusion  Acute renal failure  - hx of CKD with unknown baseline  Metabolic Acidosis  - lactic acidosis, respiratory compensation  NSTEMI  HFpEF, decompensated EF 60% with diastolic failure on most recent TTE  Hyperkalemia  Hypomagnesemia  Paroxysmal Afib  Hx of prostate cancer  Hx of merkel cell carcinoma      Plan  -Continue critical care level management at this time.   -Currently off pressors, maintain MAP goal >65   -Continue with supplement oxygen, can titrate for goal SpO2 88-92% and wean as tolerated   -Continue Nebulized treatments for wheezing  -Cardiology following, appreciate their assistance   -Discontinue heparin due to thrombocytopenia    -Amiodarone drip stopped yesterday for oral taper    -Consider digoxin if further HR control if needed with caution due to renal dysfunction    -Hold beta blocker given hypotension, resume with BP improves    -Tolerated Lasix trials    -Hold Eliquis if thoracentesis planned   -ID following, appreciate recommendations   -Obtain sputum culture if possible   -Recommended CT chest   -Retroperitoneal U/S negative for hydronephrosis  -Continue Merrem 1/5/24 as there was no improvement with Rocephin initially; possible transition back   -Initial BC done on 1/2/24 + Strep Pneumo 2/2 on ; sensitive to gent, meropenem and zosyn  -Repeat blood cultures x 2 collected on 1/5/24 pending   -Repeat urine culture negative for growth at 24 hours   -Urology consulted and signed off on 1/5/24, appreciate their assistance  -low suspicion that sepsis is from his catheter  -Awaiting CT scan of abdomen to r/o abscess   -Supra pubic bladder catheter exchanged 1/5/24   -Nephrology evaluated pt and signed off at this time. Appreciate their assistance  -Patient's family discussing potential [place], we will discuss with case management in the morning if this is possible  -Family elected to change  pt's code status to chemical code only.      CODE STATUS: Partial Code - chemical code   Consults: Urology, Nephrology, Cardiology  Access: Peripheral  Drains: suprapubic catheter replaced 1/5/24   Diet: Diet NPO  Fluids: none  Pressors: None   Oxygenation: CPAP w Fio2 60  DVT Prophylaxis: Heparin  GI Prophylaxis: none  I&Os: pending 01/06 0701 - 01/07 0700  In: 105 [I.V.:7.7]  Out: 535 [Urine:535]        32 minutes of critical care was time spent personally by me on the following activities: development of treatment plan with patient or surrogate and bedside caregivers, discussions with consultants, evaluation of patient's response to treatment, examination of patient, ordering and performing treatments and interventions, ordering and review of laboratory studies, ordering and review of radiographic studies, pulse oximetry, re-evaluation of patient's condition.  This critical care time did not overlap with that of any other provider or involve time for any procedures.     Alexander Andrade MD  Pulmonary Critical Care Medicine  [facility] - [facility]   DOS: 01/07/2024

## 2024-01-07 NOTE — PROGRESS NOTES
Inpatient Nutrition Assessment    Admit Date: 1/2/2024   Total duration of encounter: 4 days   Patient Age: 89 y.o.    Nutrition Recommendation/Prescription     Tube feeding recs:  Check baseline magnesium and phosphorus, and continue to monitor daily through tube feeding progression. Replete as needed  Start Peptamen AF @ 15 mL/hr for 24 hrs  After 24 hrs, progress by 10 mL/hr q 12 hrs to goal rate of 70 mL/hr to provide:  1680 kcals (100% est needs)  106 g protein (145 % est needs)  1142 mL fluid     Flush per physician.       Otherwise progress diet as medically feasible.     Communication of Recommendations: reviewed with nurse and reviewed with patient    Nutrition Assessment     Malnutrition Assessment/Nutrition-Focused Physical Exam    Malnutrition Context: acute illness or injury (01/03/24 1223)  Malnutrition Level:  (does not meet criteria) (01/03/24 1223)  Energy Intake (Malnutrition):  (unable to eval) (01/03/24 1223)  Weight Loss (Malnutrition):  (unable to eval) (01/03/24 1223)  Subcutaneous Fat (Malnutrition):  (does not meet criteria) (01/03/24 1223)           Muscle Mass (Malnutrition): mild depletion (01/03/24 1223)  Confucianist Region (Muscle Loss): mild depletion  Clavicle Bone Region (Muscle Loss): mild depletion                    Fluid Accumulation (Malnutrition):  (does not meet criteria) (01/03/24 1223)        A minimum of two characteristics is recommended for diagnosis of either severe or non-severe malnutrition.    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, COPD, prostate cancer (s/p ureteral stent placement), paroxysmal AFib, CKD, HTN, HLD, prostate cancer, and Merkel cell carcinoma     Scheduled Medications:  amiodarone, 400 mg, BID   Followed by  [START ON 1/8/2024] amiodarone, 200 mg, BID   Followed by  [START ON 1/11/2024] amiodarone, 200 mg, Daily  atorvastatin, 10 mg, Daily  [START ON 1/7/2024] calcitRIOL, 0.25 mcg, Daily  famotidine (PF), 20 mg, Daily  gabapentin, 300 mg, QHS  meropenem (MERREM) IVPB, 1 g, Q12H  mupirocin, , BID  sodium chloride 0.9%, 10 mL, Q6H    Continuous Infusions:  sodium chloride 0.9%, Last Rate: 10 mL/hr at 01/06/24 1800  albuterol 0.083%, Last Rate: 10 mg/hr (01/02/24 1256)  NORepinephrine bitartrate-D5W, Last Rate: Stopped (01/06/24 0434)    PRN Medications: acetaminophen, acetaminophen, albuterol-ipratropium, melatonin, metoprolol, morphine, naloxone, ondansetron, prochlorperazine, simethicone, sodium chloride 0.9%, sodium chloride 0.9%, Flushing PICC/Midline Protocol **AND** sodium chloride 0.9% **AND** sodium chloride 0.9%    Calorie Containing IV Medications: no significant kcals from medications at this time    Recent Labs   Lab 01/02/24  1042 01/02/24  1635 01/02/24  1637 01/02/24  2234 01/03/24  0218 01/04/24  0108 01/05/24  0124 01/06/24  0159    141  --  143 141 138 141  --    K 5.7* 5.5*  --  4.9 5.1 4.3 3.9  --    CALCIUM 8.4* 8.1*  --  8.5* 8.7* 8.5* 9.6  --    PHOS  --  3.4  --  3.6 3.8  --   --   --    MG 1.50* 2.00  --   --  2.00  --   --   --    CHLORIDE 113* 114*  --  115* 112* 109 110  --    CO2 16* 12*  --  14* 16* 17* 19*  --    BUN 83.4* 80.4*  --  80.9* 80.2* 81.6* 86.3*  --    CREATININE 3.10* 2.83*  --  2.65* 2.59* 2.66* 2.63*  --    EGFRNORACEVR 18 20  --  22 22 22 22  --    GLUCOSE 84 113  --  73* 106 159* 114  --    BILITOT 0.7 0.4  --   --  0.4 0.4 0.4  --    ALKPHOS 36* 34*  --   --  47 70 80  --   "  ALT 21 20  --   --  21 18 15  --    AST 33 37*  --   --  47* 39* 34  --    ALBUMIN 2.5* 2.2*  --  2.2* 2.3* 2.0* 1.8*  --    WBC 13.89  13.89*  --  7.77  7.77  --  8.6  8.60 10.33  10.33 9.49  9.49 8.42   HGB 11.4*  --  11.0* 11.5* 12.2* 11.5* 12.0* 12.4*   HCT 37.4*  --  36.0* 37.2* 38.0* 35.1* 37.3* 39.7*       Nutrition Orders:  Diet NPO  Dietary nutrition supplements Boost VHC Vanilla; TID    Appetite/Oral Intake: NPO/NPO  Factors Affecting Nutritional Intake: NPO  Food/Shinto/Cultural Preferences: none reported  Food Allergies: none reported  Last Bowel Movement: 01/01/24  Wound(s):  none noted    Comments    1/3/24: Pt unable to verify much subjective info at this time, unsure of UBW. Agreeable to ONS once able to have po intake of meals. Noted pt now NPO. Noted current GFR, renal consulted. Will monitor for need for change to est needs/recommendations.     1/6/24: Consult received for tube feedings, no family in room, pt unable to give info at this time, nurse reports she was told pt had little to no po intake prior to admit on 1/2, has been npo through admit other than brief stent of regular diet from almost midnight 1/2 to morning of 1/3. Discussed that pt is likely at refeeding syndrome risk.     Anthropometrics    Height: 5' 5" (165.1 cm), Height Method: Stated  Last Weight: 72.5 kg (159 lb 13.3 oz) (01/02/24 2220), Weight Method: Bed Scale  BMI (Calculated): 26.6  BMI Classification: overweight (BMI 25-29.9)        Ideal Body Weight (IBW), Male: 136 lb     % Ideal Body Weight, Male (lb): 117.53 %                          Usual Weight Provided By: unable to obtain usual weight    Wt Readings from Last 5 Encounters:   01/02/24 72.5 kg (159 lb 13.3 oz)     Weight Change(s) Since Admission:   Wt Readings from Last 1 Encounters:   01/02/24 2220 72.5 kg (159 lb 13.3 oz)   01/02/24 0930 77.1 kg (170 lb)   Admit Weight: 77.1 kg (170 lb) (01/02/24 0930), Weight Method: Estimated    Estimated " Needs    Weight Used For Calorie Calculations: 72.5 kg (159 lb 13.3 oz)  Energy Calorie Requirements (kcal): 1680kcal (1.3 stress factor)  Energy Need Method: Hobbs-St Jeor  Weight Used For Protein Calculations: 72.5 kg (159 lb 13.3 oz)  Protein Requirements: 58-73gm (0.8-1g/kg) - may need to be updated pending renal function  Fluid Requirements (mL): per MD    Enteral Nutrition     Patient not receiving enteral nutrition at this time.    Parenteral Nutrition     Patient not receiving parenteral nutrition support at this time.    Evaluation of Received Nutrient Intake    Calories: not meeting estimated needs  Protein: not meeting estimated needs    Patient Education     Not applicable.    Nutrition Diagnosis     PES: Inadequate oral intake related to acute illness as evidenced by NPO since 1/3/24. (new)     Nutrition Interventions     Intervention(s): general/healthful diet, modified composition of enteral nutrition, modified rate of enteral nutrition, commercial beverage, and collaboration with other providers    Goal: Meet greater than 80% of nutritional needs by follow-up. (new)  Goal: Consume % of meals/snacks by follow-up. (new)    Nutrition Goals & Monitoring     Dietitian will monitor: energy intake    Nutrition Risk/Follow-Up: high (follow-up in 1-4 days)   Please consult if re-assessment needed sooner.

## 2024-01-07 NOTE — NURSING
Nurses Note -- 4 Eyes      1/7/2024   4:55 PM      Skin assessed during: Daily Assessment      [x] No Altered Skin Integrity Present    [x]Prevention Measures Documented      [] Yes- Altered Skin Integrity Present or Discovered   [] LDA Added if Not in Epic (Describe Wound)   [] New Altered Skin Integrity was Present on Admit and Documented in LDA   [] Wound Image Taken    Wound Care Consulted? No    Attending Nurse:  Dirk Jade RN    Second RN/Staff Member:   Kristen Goodman CNA

## 2024-01-08 LAB
ALBUMIN SERPL-MCNC: 1.9 G/DL (ref 3.4–4.8)
ALBUMIN/GLOB SERPL: 0.6 RATIO (ref 1.1–2)
ALP SERPL-CCNC: 103 UNIT/L (ref 40–150)
ALT SERPL-CCNC: 13 UNIT/L (ref 0–55)
APTT PPP: 35.1 SECONDS (ref 23.2–33.7)
AST SERPL-CCNC: 31 UNIT/L (ref 5–34)
BACTERIA UR CULT: ABNORMAL
BASOPHILS # BLD AUTO: 0.02 X10(3)/MCL
BASOPHILS NFR BLD AUTO: 0.2 %
BILIRUB SERPL-MCNC: 0.6 MG/DL
BUN SERPL-MCNC: 98 MG/DL (ref 8.4–25.7)
CALCIUM SERPL-MCNC: 9.1 MG/DL (ref 8.8–10)
CHLORIDE SERPL-SCNC: 115 MMOL/L (ref 98–111)
CO2 SERPL-SCNC: 21 MMOL/L (ref 23–31)
CREAT SERPL-MCNC: 2.52 MG/DL (ref 0.73–1.18)
EOSINOPHIL # BLD AUTO: 0.03 X10(3)/MCL (ref 0–0.9)
EOSINOPHIL NFR BLD AUTO: 0.3 %
ERYTHROCYTE [DISTWIDTH] IN BLOOD BY AUTOMATED COUNT: 16.3 % (ref 11.5–17)
GFR SERPLBLD CREATININE-BSD FMLA CKD-EPI: 23 MLS/MIN/1.73/M2
GLOBULIN SER-MCNC: 3.2 GM/DL (ref 2.4–3.5)
GLUCOSE SERPL-MCNC: 103 MG/DL (ref 75–121)
HCT VFR BLD AUTO: 37.5 % (ref 42–52)
HGB BLD-MCNC: 11.9 G/DL (ref 14–18)
IMM GRANULOCYTES # BLD AUTO: 0.14 X10(3)/MCL (ref 0–0.04)
IMM GRANULOCYTES NFR BLD AUTO: 1.2 %
LYMPHOCYTES # BLD AUTO: 0.59 X10(3)/MCL (ref 0.6–4.6)
LYMPHOCYTES NFR BLD AUTO: 5.2 %
MCH RBC QN AUTO: 26.6 PG (ref 27–31)
MCHC RBC AUTO-ENTMCNC: 31.7 G/DL (ref 33–36)
MCV RBC AUTO: 83.9 FL (ref 80–94)
MONOCYTES # BLD AUTO: 0.35 X10(3)/MCL (ref 0.1–1.3)
MONOCYTES NFR BLD AUTO: 3.1 %
NEUTROPHILS # BLD AUTO: 10.31 X10(3)/MCL (ref 2.1–9.2)
NEUTROPHILS NFR BLD AUTO: 90 %
NRBC BLD AUTO-RTO: 0 %
PLATELET # BLD AUTO: 97 X10(3)/MCL (ref 130–400)
PMV BLD AUTO: 12 FL (ref 7.4–10.4)
POTASSIUM SERPL-SCNC: 3.8 MMOL/L (ref 3.5–5.1)
PROT SERPL-MCNC: 5.1 GM/DL (ref 5.8–7.6)
RBC # BLD AUTO: 4.47 X10(6)/MCL (ref 4.7–6.1)
SODIUM SERPL-SCNC: 146 MMOL/L (ref 132–146)
WBC # SPEC AUTO: 11.44 X10(3)/MCL (ref 4.5–11.5)

## 2024-01-08 PROCEDURE — 25000003 PHARM REV CODE 250: Performed by: NURSE PRACTITIONER

## 2024-01-08 PROCEDURE — 85025 COMPLETE CBC W/AUTO DIFF WBC: CPT | Performed by: NURSE PRACTITIONER

## 2024-01-08 PROCEDURE — 99900031 HC PATIENT EDUCATION (STAT)

## 2024-01-08 PROCEDURE — 99291 CRITICAL CARE FIRST HOUR: CPT | Mod: FS,,, | Performed by: GENERAL PRACTICE

## 2024-01-08 PROCEDURE — 63600175 PHARM REV CODE 636 W HCPCS: Performed by: GENERAL PRACTICE

## 2024-01-08 PROCEDURE — 25000003 PHARM REV CODE 250: Performed by: GENERAL PRACTICE

## 2024-01-08 PROCEDURE — 93005 ELECTROCARDIOGRAM TRACING: CPT

## 2024-01-08 PROCEDURE — 25000003 PHARM REV CODE 250: Performed by: INTERNAL MEDICINE

## 2024-01-08 PROCEDURE — 93010 ELECTROCARDIOGRAM REPORT: CPT | Mod: ,,, | Performed by: STUDENT IN AN ORGANIZED HEALTH CARE EDUCATION/TRAINING PROGRAM

## 2024-01-08 PROCEDURE — 94660 CPAP INITIATION&MGMT: CPT

## 2024-01-08 PROCEDURE — 27100171 HC OXYGEN HIGH FLOW UP TO 24 HOURS

## 2024-01-08 PROCEDURE — 80053 COMPREHEN METABOLIC PANEL: CPT

## 2024-01-08 PROCEDURE — 99900035 HC TECH TIME PER 15 MIN (STAT)

## 2024-01-08 PROCEDURE — A4216 STERILE WATER/SALINE, 10 ML: HCPCS | Performed by: INTERNAL MEDICINE

## 2024-01-08 PROCEDURE — 94761 N-INVAS EAR/PLS OXIMETRY MLT: CPT

## 2024-01-08 PROCEDURE — 85730 THROMBOPLASTIN TIME PARTIAL: CPT | Performed by: NURSE PRACTITIONER

## 2024-01-08 PROCEDURE — 27000207 HC ISOLATION

## 2024-01-08 PROCEDURE — 63600175 PHARM REV CODE 636 W HCPCS: Performed by: INTERNAL MEDICINE

## 2024-01-08 PROCEDURE — 20000000 HC ICU ROOM

## 2024-01-08 PROCEDURE — 87040 BLOOD CULTURE FOR BACTERIA: CPT | Performed by: NURSE PRACTITIONER

## 2024-01-08 RX ORDER — OXYBUTYNIN CHLORIDE 5 MG/1
5 TABLET ORAL 3 TIMES DAILY
Status: DISCONTINUED | OUTPATIENT
Start: 2024-01-08 | End: 2024-01-11 | Stop reason: HOSPADM

## 2024-01-08 RX ADMIN — ATORVASTATIN CALCIUM 10 MG: 10 TABLET, FILM COATED ORAL at 09:01

## 2024-01-08 RX ADMIN — MEROPENEM 1 G: 1 INJECTION, POWDER, FOR SOLUTION INTRAVENOUS at 05:01

## 2024-01-08 RX ADMIN — GABAPENTIN 300 MG: 300 CAPSULE ORAL at 09:01

## 2024-01-08 RX ADMIN — AMIODARONE HYDROCHLORIDE 200 MG: 200 TABLET ORAL at 09:01

## 2024-01-08 RX ADMIN — SODIUM CHLORIDE, PRESERVATIVE FREE 10 ML: 5 INJECTION INTRAVENOUS at 06:01

## 2024-01-08 RX ADMIN — CEFTRIAXONE SODIUM 2 G: 2 INJECTION, POWDER, FOR SOLUTION INTRAMUSCULAR; INTRAVENOUS at 12:01

## 2024-01-08 RX ADMIN — OXYBUTYNIN CHLORIDE 5 MG: 5 TABLET ORAL at 09:01

## 2024-01-08 RX ADMIN — FAMOTIDINE 20 MG: 10 INJECTION, SOLUTION INTRAVENOUS at 09:01

## 2024-01-08 RX ADMIN — OXYBUTYNIN CHLORIDE 5 MG: 5 TABLET ORAL at 04:01

## 2024-01-08 RX ADMIN — AMIODARONE HYDROCHLORIDE 400 MG: 200 TABLET ORAL at 09:01

## 2024-01-08 RX ADMIN — CALCITRIOL CAPSULES 0.25 MCG 0.25 MCG: 0.25 CAPSULE ORAL at 09:01

## 2024-01-08 RX ADMIN — SODIUM CHLORIDE, PRESERVATIVE FREE 10 ML: 5 INJECTION INTRAVENOUS at 12:01

## 2024-01-08 NOTE — PROGRESS NOTES
[facility] [address]  Wound Care    Patient Name:  Francis Montero   MRN:  [id number]  Date: 1/8/2024  Diagnosis: SOB (shortness of breath)    History:     Past Medical History:   Diagnosis Date    CKD (chronic kidney disease)     COPD (chronic obstructive pulmonary disease)     HLD (hyperlipidemia)     HTN (hypertension)     Merkel cell carcinoma     Paroxysmal A-fib     Prostate cancer     PUD (peptic ulcer disease)        Social History     Socioeconomic History    Marital status:      Social Determinants of Health     Housing Stability: Unknown (1/3/2024)    Housing Stability Vital Sign     Unable to Pay for Housing in the Last Year: No       Precautions:     Allergies as of 01/02/2024    (No Known Allergies)       WOC Assessment Details/Treatment        01/08/24 1001   WOCN Assessment   Visit Date 01/08/24   Visit Time 1001   Consult Type New   Wound pressure   Intervention assessed;chart review;orders   Teaching on-going   Skin Interventions   Device Skin Pressure Protection absorbent pad utilized/changed;skin-to-device areas padded        Altered Skin Integrity 01/08/24 Nose Purple or maroon localized area of discolored intact skin or non-intact skin or a blood-filled blister.   Date First Assessed: 01/08/24   Altered Skin Integrity Present on Admission - Did Patient arrive to the hospital with altered skin?: suspected hospital acquired  Location: Nose  Is this injury device related?: (c) Yes  Description of Altered Skin Inte...   Wound Image    Description of Altered Skin Integrity Purple or maroon localized area of discolored intact skin or non-intact skin or a blood-filled blister.   Dressing Appearance Dry;Intact;Clean   Drainage Amount None   Appearance Maroon;Purple   Tissue loss description Not applicable   Periwound Area Redness   Wound Edges Defined   Wound Length (cm) 1 cm   Wound Width (cm) 1.5 cm   Wound Surface Area (cm^2) 1.5 cm^2   Care Cleansed with:;Sterile normal saline    Dressing Applied     WOCN consulted for bridge of nose. Family at bedside. Mask in place. Treatment recommendations put into place. Bridge of nose:  Cleanse with NS. Apply hydrocolloid. Change every three days. Use foam or abd pad to pad tubing. Nursing to continue with preventative measures.  Head of bed elevated, bed in lowest position. On ICU SHIRLEY mattress. Will follow up.    01/08/2024

## 2024-01-08 NOTE — PROGRESS NOTES
.UROLOGY  PROGRESS  NOTE    Francis Montero 3/1/1929  [id number]  1/8/2024    Called by nursing d/t urine leaking from penis  SPT functioning, exchanged Friday  Patient's family reports he does have some leakage at baseline, Cunningham clamp in place    335ml UOP overnight  Repeat UC with candida    Intake/Output:  No intake/output data recorded.  I/O last 3 completed shifts:  In: 646.1 [I.V.:228.3; NG/GT:220; IV Piggyback:197.8]  Out: 870 [Urine:870]       Exam:    NAD  Resp unlabored, on CPAP  Abd soft, NTND   SPT site c/d/I; brooks yellow urine in  bag; cunningham clamp in place      Recent Results (from the past 24 hour(s))   RT Blood Gas    Collection Time: 01/07/24 11:30 PM   Result Value Ref Range    Sample Type Arterial Blood     Sample site Right Radial Artery     Drawn by Pantera Esparza     pH, Blood gas 7.340 (L) 7.350 - 7.450    pCO2, Blood gas 43.0 35.0 - 45.0 mmHg    pO2, Blood gas 64.0 (L) 80.0 - 100.0 mmHg    Sodium, Blood Gas 140 137 - 145 mmol/L    Potassium, Blood Gas 3.4 (L) 3.5 - 5.0 mmol/L    Calcium Level Ionized 1.34 (H) 1.12 - 1.23 mmol/L    TOC2, Blood gas 24.5 mmol/L    Base Excess, Blood gas -2.50 (L) -2.00 - 2.00 mmol/L    sO2, Blood gas 90.6 %    HCO3, Blood gas 23.2 22.0 - 26.0 mmol/L    THb, Blood gas 11.2 (L) 12 - 16 g/dL    O2 Hb, Blood Gas 91.6 (L) 94.0 - 97.0 %    CO Hgb 0.0 (L) 0.5 - 1.5 %    Met Hgb 0.2 (L) 0.4 - 1.5 %    Allens Test Yes     MODE CPAP     FIO2, Blood gas 60 %    CPAP 10 cm H2O   APTT    Collection Time: 01/08/24  2:37 AM   Result Value Ref Range    PTT 35.1 (H) 23.2 - 33.7 seconds   CBC with Differential    Collection Time: 01/08/24  2:37 AM   Result Value Ref Range    WBC 11.44 4.50 - 11.50 x10(3)/mcL    RBC 4.47 (L) 4.70 - 6.10 x10(6)/mcL    Hgb 11.9 (L) 14.0 - 18.0 g/dL    Hct 37.5 (L) 42.0 - 52.0 %    MCV 83.9 80.0 - 94.0 fL    MCH 26.6 (L) 27.0 - 31.0 pg    MCHC 31.7 (L) 33.0 - 36.0 g/dL    RDW 16.3 11.5 - 17.0 %    Platelet 97 (L) 130 - 400 x10(3)/mcL    MPV  12.0 (H) 7.4 - 10.4 fL    Neut % 90.0 %    Lymph % 5.2 %    Mono % 3.1 %    Eos % 0.3 %    Basophil % 0.2 %    Lymph # 0.59 (L) 0.6 - 4.6 x10(3)/mcL    Neut # 10.31 (H) 2.1 - 9.2 x10(3)/mcL    Mono # 0.35 0.1 - 1.3 x10(3)/mcL    Eos # 0.03 0 - 0.9 x10(3)/mcL    Baso # 0.02 <=0.2 x10(3)/mcL    IG# 0.14 (H) 0 - 0.04 x10(3)/mcL    IG% 1.2 %    NRBC% 0.0 %   Comprehensive Metabolic Panel    Collection Time: 01/08/24  5:42 AM   Result Value Ref Range    Sodium Level 146 132 - 146 mmol/L    Potassium Level 3.8 3.5 - 5.1 mmol/L    Chloride 115 (H) 98 - 111 mmol/L    Carbon Dioxide 21 (L) 23 - 31 mmol/L    Glucose Level 103 75 - 121 mg/dL    Blood Urea Nitrogen 98.0 (H) 8.4 - 25.7 mg/dL    Creatinine 2.52 (H) 0.73 - 1.18 mg/dL    Calcium Level Total 9.1 8.8 - 10.0 mg/dL    Protein Total 5.1 (L) 5.8 - 7.6 gm/dL    Albumin Level 1.9 (L) 3.4 - 4.8 g/dL    Globulin 3.2 2.4 - 3.5 gm/dL    Albumin/Globulin Ratio 0.6 (L) 1.1 - 2.0 ratio    Bilirubin Total 0.6 <=1.5 mg/dL    Alkaline Phosphatase 103 40 - 150 unit/L    Alanine Aminotransferase 13 0 - 55 unit/L    Aspartate Aminotransferase 31 5 - 34 unit/L    eGFR 23 mls/min/1.73/m2         Assessment:  -UTI with chronic ureteral stent and SPT - Cultures with ESBL klebsiella on admit; repeat with candida  -bacteremia, sepsis - cultures with streptococcus  -acute on chronic respiratory failure  -STEPHEN on CKD  -h/o prostate cancer with stricture s/t radiation requiring chronic ureteral stent      Plan:  -ditropan added for urine leakage  -Will check in on patient tomorrow  -Please call with any issues      Arianna Ornelas [facility]-BC

## 2024-01-08 NOTE — NURSING
Nurses Note -- 4 Eyes      1/8/2024   2:05 AM      Skin assessed during: Daily Assessment      [x] No Altered Skin Integrity Present    [x]Prevention Measures Documented      [] Yes- Altered Skin Integrity Present or Discovered   [] LDA Added if Not in Epic (Describe Wound)   [] New Altered Skin Integrity was Present on Admit and Documented in LDA   [] Wound Image Taken    Wound Care Consulted? No    Attending Nurse:  Mary Chua RN/Staff Member:   RADHA Rai

## 2024-01-08 NOTE — PROGRESS NOTES
[facility]  Infectious Disease Progress Note            ASSESSMENT & PLAN:     Information for HPI gathered through chart review given patient's clinical condition.  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  Subsequently found to have Streptococcus pneumoniae bacteremia.  Chest x-ray concerning for right-sided pneumonia with associated pleural effusion as well as cardiac decompensation.  Patient became progressively more hypotensive and hypoxic and was transferred to the ICU for vasopressor support and BiPAP.  Family has now made him a chemical code only.  He was evaluated by Urology given some concern for urinary source sepsis, however felt to be unlikely per their note.  CT abdomen and pelvis has been ordered for further evaluation, however patient currently too unstable.  Renal function has been stable.  Urine culture is positive for ESBL Klebsiella.  Patient is currently receiving meropenem and vancomycin.  TTE without concern for endocarditis.  We have been consulted per [employer] policy for the use of meropenem.        Streptococcus pneumoniae bacteremia s/t the following  CAP   Right pleural effusion - ?empyema  Septic shock  Acute hypoxemic respiratory failure  Positive UCx - suspect ASB  H/o prostate CA / ureteral stent   H/o pulmonary fibrosis secondary to asbestos / COPD / CKD / Afib  SPC        PLAN:  Change abx to ceftriaxone 2g IV q24h.  SPC exchanged on Friday.  Repeat UCx negative.  Repeat BCx ordered 1/5, not collected - will ensure collected today.  Remains on CPAP with tenuous respiratory status overall, unstable for CT.  Would still like to obtain once stable.   Remainder of care per ICU.    Discussed with nursing.       SUBJECTIVE:   Remains on CPAP.  No  "events over the weekend.     MEDICATIONS:   Reviewed in EMR    REVIEW OF SYSTEMS:   Except as documented, all other systems reviewed and negative     PHYSICAL EXAM:   T 96.8 °F (36 °C)   BP (!) 132/58   P 73   RR 15   O2 (!) 94 %  GENERAL: Elderly male on CPAP; fatigued; NAD; does not appear toxic  SKIN: no rash  HEENT: sclera non-icteric; PERRL; CPAP in place  NECK: supple; no LAD  CHEST: Some scattered rhonchi, decreased some on R; nonlabored, equal expansion  CARDIOVASCULAR: RRR, S1S2; no murmur   ABDOMEN:  active bowel sounds; abdomen soft, nondistended, nontender to palpation  GENITOURINARY SPC in place  EXTREMITIES: no cyanosis or clubbing  NEURO: AAO x4; CN II-XII grossly intact  PSYCH: Mentation and affect appropriate     LABS AND IMAGING:     Recent Labs     01/07/24  0259 01/08/24  0237   WBC 8.57 11.44   RBC 4.49* 4.47*   HGB 11.8* 11.9*   HCT 38.8* 37.5*   MCV 86.4 83.9   MCH 26.3* 26.6*   MCHC 30.4* 31.7*   RDW 16.3 16.3   PLT 89* 97*     No results for input(s): "LACTIC" in the last 72 hours.  No results for input(s): "INR", "APTT", "D-DIMER" in the last 72 hours.  No results for input(s): "HGBA1C", "CHOL", "TRIG", "LDL", "VLDL", "HDL" in the last 72 hours.   Recent Labs     01/07/24  0259 01/08/24  0542    146   K 3.6 3.8   CHLORIDE 113* 115*   CO2 20* 21*   BUN 95.0* 98.0*   CREATININE 2.81* 2.52*   GLUCOSE 100 103   CALCIUM 9.0 9.1   ALBUMIN 1.8* 1.9*   GLOBULIN 3.2 3.2   ALKPHOS 80 103   ALT 11 13   AST 30 31   BILITOT 0.5 0.6     No results for input(s): "BNP", "CPK", "TROPONINI" in the last 72 hours.       X-Ray Chest 1 View  EXAMINATION  XR CHEST 1 VIEW    CLINICAL HISTORY  Acute Pulmonary process;    TECHNIQUE  A total of 2 frontal image(s) of the chest.    COMPARISON  4 January 2024    FINDINGS  Lines/tubes/devices: Enteric tube is now visualized, extends inferior to the level of the diaphragm and side port approximately 5 cm distal to the GE junction.  Left upper extremity PICC in " similar position.    The cardiac silhouette and central vascular structures are unchanged.  The trachea is midline. Diffuse interstitial and airspace opacities again noted predominately through the bilateral mid and lower lung zones, as well as mild appearance at the right upper lung.  No definite new or worsening lobar consolidation is identified.  Small right pleural effusion remains.  Scattered pleural calcifications similar to the prior study.  No convincing pneumothorax.    Regional osseous structures and extrathoracic soft tissues are similar.    IMPRESSION  1. Interval placement of enteric tube terminating at the left upper quadrant.  2. Unchanged appearance of bilateral interstitial and alveolar opacities.  3. Additional secondary findings similar in the interval.    Electronically signed by: Thomas Sanderson  Date:    01/07/2024  Time:    14:42          NADIR Tony  Infectious Disease

## 2024-01-08 NOTE — PROGRESS NOTES
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  Patient was originally started on IV antibiotics however during his stay began to become progressively hypotensive.  Patient received 3 L fluid boluses which did not improve patient's blood pressure to a map greater than 65 at which point patient was initiated on Levophed for hemodynamic support.  Of note, patient is visiting [place] and reports that he has been feeling somewhat ill the last 2 days.  Admits to dysuria, fatigue, myalgia, shortness a breath.  Reports no chest pain, palpitations, fevers, chills, nausea, vomiting.  ICU was consulted for upgrade.    Hospital Course/Significant events:  01/01/2024:  Admitted to [facility]  01/02/2024:  Upgraded to ICU, initiated on Levophed  01/04/2024: PICC line placed   01/05/2024: Suprapubic catheter exchanged    24 Hour Interval History:  No acute events occurred overnight. Vital signs remained stable and patient is afebrile. Patient is now off of pressors since 4:30AM. Currently on CPAP with FiO2 of 70; did not tolerate trial of Vapotherm yesterday. Awaiting CT scan of abdomen to r/o abscess which was not completed yesterday. Cardiology transitioned amio drip to oral taper. Labs appear stable. Pt appears to be more confused over the past 24 hours than he had been previously.     Past Medical History:   Diagnosis  Date    CKD (chronic kidney disease)     COPD (chronic obstructive pulmonary disease)     HLD (hyperlipidemia)     HTN (hypertension)     Merkel cell carcinoma     Paroxysmal A-fib     Prostate cancer     PUD (peptic ulcer disease)      Past Surgical History:   Procedure Laterality Date    INGUINAL HERNIA REPAIR      PROSTATE SURGERY      ROTATOR CUFF REPAIR       Social History     Socioeconomic History    Marital status:      Social Determinants of Health     Housing Stability: Unknown (1/3/2024)    Housing Stability Vital Sign     Unable to Pay for Housing in the Last Year: No     Current Outpatient Medications   Medication Instructions    atorvastatin (LIPITOR) 10 mg, Oral, Daily    calcitRIOL (ROCALTROL) 0.25 mcg, Oral, Daily, Takes every other day    gabapentin (NEURONTIN) 300 mg, Oral, Nightly    metoprolol tartrate (LOPRESSOR) 25 mg, Oral, 2 times daily    NON FORMULARY MEDICATION 850 mg, Oral, Daily, Umary     Current Inpatient Medications   amiodarone  400 mg Oral BID    Followed by    amiodarone  200 mg Oral BID    Followed by    [START ON 1/11/2024] amiodarone  200 mg Oral Daily    atorvastatin  10 mg Oral Daily    calcitRIOL  0.25 mcg Per NG tube Daily    famotidine (PF)  20 mg Intravenous Daily    gabapentin  300 mg Oral QHS    meropenem (MERREM) IVPB  1 g Intravenous Q12H    sodium chloride 0.9%  10 mL Intravenous Q6H     Current Intravenous Infusions   sodium chloride 0.9% 10 mL/hr at 01/07/24 1800    albuterol 0.083% 10 mg/hr (01/02/24 1256)    NORepinephrine bitartrate-D5W Stopped (01/06/24 0434)       Review of Systems   Constitutional:  Positive for chills and fever. Negative for weight loss.   HENT:  Negative for congestion and sinus pain.    Eyes:  Negative for blurred vision.   Respiratory:  Negative for cough, hemoptysis and wheezing.    Cardiovascular:  Negative for chest pain and palpitations.   Gastrointestinal:  Negative for abdominal pain, nausea and vomiting.   Genitourinary:   Positive for dysuria and urgency. Negative for frequency and hematuria.   Musculoskeletal:  Positive for back pain and neck pain. Negative for myalgias.   Skin:  Negative for itching and rash.   Neurological:  Negative for dizziness, tingling, sensory change and headaches.      Objective:       Intake/Output Summary (Last 24 hours) at 1/8/2024 0302  Last data filed at 1/8/2024 0201  Gross per 24 hour   Intake 646.06 ml   Output 605 ml   Net 41.06 ml       Vital Signs (Most Recent):  Temp: 98.1 °F (36.7 °C) (01/08/24 0000)  Pulse: 63 (01/08/24 0200)  Resp: (!) 25 (01/08/24 0200)  BP: 125/65 (01/08/24 0200)  SpO2: 96 % (01/08/24 0200)  Body mass index is 26.6 kg/m².  Weight: 72.5 kg (159 lb 13.3 oz) Vital Signs (24h Range):  Temp:  [95 °F (35 °C)-98.3 °F (36.8 °C)] 98.1 °F (36.7 °C)  Pulse:  [] 63  Resp:  [12-25] 25  SpO2:  [82 %-100 %] 96 %  BP: (102-150)/() 125/65     Physical Exam  Constitutional:       General: He is not in acute distress.     Appearance: He is ill-appearing.      Comments: Pulling at mitts and rolling around in bed on exam.   HENT:      Head:      Comments: CPAP in place. PICC line placed, dressing intact.   Cardiovascular:      Rate and Rhythm: Tachycardia present.      Pulses: Normal pulses.      Heart sounds: Murmur heard.      No friction rub. No gallop.      Comments: Systolic murmur noted  Pulmonary:      Effort: Pulmonary effort is normal.      Breath sounds: No stridor. Rhonchi and rales present. No wheezing.   Abdominal:      General: Bowel sounds are normal.      Palpations: Abdomen is soft.      Tenderness: There is no abdominal tenderness. There is no guarding.   Musculoskeletal:         General: No swelling.      Right lower leg: No edema.      Left lower leg: No edema.   Neurological:      General: No focal deficit present.      Mental Status: He is alert and oriented to person, place, and time.       Lines/Drains/Airways       Peripherally Inserted Central Catheter Line   Duration             PICC Triple Lumen 01/04/24 1725 left basilic 3 days              Drain  Duration                  NG/OG Tube 01/05/24 1500 Bullitt sump 16 Fr. Left nostril 2 days         Suprapubic Catheter 01/05/24 1720 24 Fr. 2 days              Peripheral Intravenous Line  Duration                  Peripheral IV - Single Lumen 01/04/24 1459 20 G Anterior;Left;Proximal Forearm 3 days                    Significant Labs:  Lab Results   Component Value Date    WBC 8.57 01/07/2024    HGB 11.8 (L) 01/07/2024    HCT 38.8 (L) 01/07/2024    MCV 86.4 01/07/2024    PLT 89 (L) 01/07/2024     BMP  Lab Results   Component Value Date     01/07/2024    K 3.6 01/07/2024    CHLORIDE 113 (H) 01/07/2024    CO2 20 (L) 01/07/2024    BUN 95.0 (H) 01/07/2024    CREATININE 2.81 (H) 01/07/2024    CALCIUM 9.0 01/07/2024     ABG  Recent Labs   Lab 01/07/24  2330   PH 7.340*   PO2 64.0*   PCO2 43.0   HCO3 23.2   POCBASEDEF -2.50*       Mechanical Ventilation Support:  Oxygen Concentration (%): 70 (01/08/24 0200)    Significant Imaging:  Imaging Results              X-Ray Chest AP Portable (Final result)  Result time 01/02/24 21:00:06      Final result by Darrius Mcpherson MD (01/02/24 21:00:06)                   Impression:      Changes most consistent with pulmonary edema and a large right-sided pleural effusion.  This appears grossly stable from the previous exam      Electronically signed by: Darrius Mcpherson MD  Date:    01/02/2024  Time:    21:00               Narrative:    EXAMINATION:  XR CHEST AP PORTABLE    CLINICAL HISTORY:  Shortness of breath    TECHNIQUE:  Single frontal view of the chest was performed.    COMPARISON:  01/02/2024    FINDINGS:  There is significant opacification of the right hemithorax cannot rule out a large effusion.  There are calcified pleural plaques suggestive of previous X best is exposure.  There are increased markings bilaterally most consistent with pulmonary edema                                        US Retroperitoneal Complete (Final result)  Result time 01/02/24 15:46:53      Final result by Soren Miranda MD (01/02/24 15:46:53)                   Impression:      1. No hydronephrosis.  2. Medical renal disease.      Electronically signed by: Soren Miranda  Date:    01/02/2024  Time:    15:46               Narrative:    EXAMINATION:  US RETROPERITONEAL COMPLETE    CLINICAL HISTORY:  elevated BUN/Creat;    COMPARISON:  None.    FINDINGS:  Grayscale and color Doppler sonographic evaluation of the kidneys and urinary bladder.    The right kidney measures 8 cm. The left kidney measures 8 cm.   No hydronephrosis.  Heterogeneous renal parenchymal echogenicity with loss of corticomedullary differentiation.    Bladder is not visualized.                                       CT Head Without Contrast (Final result)  Result time 01/02/24 15:12:29      Final result by Alejandrina Rodriguez MD (01/02/24 15:12:29)                   Impression:      1. No acute intracranial abnormality.  2. Chronic microvascular ischemic changes.      Electronically signed by: Alejandrina Rodriguez  Date:    01/02/2024  Time:    15:12               Narrative:    EXAMINATION:  CT HEAD WITHOUT CONTRAST    CLINICAL HISTORY:  Mental status change, unknown cause;    TECHNIQUE:  Axial scans were obtained from skull base to the vertex.    Coronal and sagittal reconstructions obtained from the axial data.    Automatic exposure control was utilized to limit radiation dose.    Contrast: None    Radiation Dose:    Total DLP: 1029 mGy*cm    COMPARISON:  None    FINDINGS:  There is no acute intracranial hemorrhage or edema.  There is a small area of encephalomalacia in the left cerebellum.    There is no mass effect or midline shift.  There is diffuse parenchymal volume loss.  The basal cisterns are patent. There is no abnormal extra-axial fluid collection.  Carotid artery calcifications are noted.    The calvarium and skull base are intact.  There are  bilateral mastoid effusions, right greater than left.                                       X-Ray Chest 1 View (Final result)  Result time 01/02/24 10:38:49      Final result by Eligio Shaikh MD (01/02/24 10:38:49)                   Impression:      Changes suggestive of pulmonary vascular congestion and cardiac decompensation.    Slightly more confluent opacities at the right base infiltrate cannot be completely excluded.    Right-sided pleural effusion      Electronically signed by: Eligio Shaikh  Date:    01/02/2024  Time:    10:38               Narrative:    EXAMINATION:  XR CHEST 1 VIEW    CPT 94372    CLINICAL HISTORY:  SOB;    FINDINGS:  Examination reveals mediastinal silhouette to be within normal limits cardiac silhouette is not enlarged there is increase in interstitial and pulmonary vascular markings indicating some degree of pulmonary vascular congestion and cardiac decompensation.    Slightly more confluent opacities identified at the right base superimposed infiltrate can not be completely excluded.    There is blunting of the right costophrenic angle representing a right-sided pleural effusion    No other focal consolidative changes                                   I have reviewed the pertinent imaging within the past 24 hours.    Assessment/Plan:     Assessment  Septic Shock - improving  Acute Cystitis without hematuria  -Hx of urinary retension   - hx of urethral stent  -initial urine culture positive for klebsiella pneumoniae  Community Acquired Pneumonia  Bacteremia -strep pneumoniae  Acute Hypoxic on chronic hypercarbic respiratory failure  - hx of COPD  - hx of asbestosis, pulmonary fibrosis?  Right pleural effusion  Acute renal failure  - hx of CKD with unknown baseline  Metabolic Acidosis  - lactic acidosis, respiratory compensation  NSTEMI  HFpEF, decompensated EF 60% with diastolic failure on most recent TTE  Hyperkalemia - resolved  Hypomagnesemia - resolved  Paroxysmal Afib  Hx  of prostate cancer  Hx of merkel cell carcinoma      Plan  -Continue critical care level management at this time.   -Currently off pressors, maintain MAP goal >65   -Continue with supplement oxygen, can titrate for goal SpO2 88-92% and wean as tolerated   -Continue Nebulized treatments for wheezing  -Cardiology following, appreciate their assistance   -Discontinue heparin due to thrombocytopenia    -Amiodarone oral taper    -Consider digoxin if further HR control if needed with caution due to renal dysfunction    -Hold beta blocker given hypotension, resume with BP improves    -Tolerated Lasix trials   -ID following, appreciate recommendations   -Obtain sputum culture if possible   -Recommended CT chest   -Retroperitoneal U/S negative for hydronephrosis  -Continue Merrem 1/5/24 as there was no improvement with Rocephin initially; possible transition back   -Initial BC done on 1/2/24 + Strep Pneumo 2/2 on ; sensitive to gent, meropenem and zosyn  -Repeat blood cultures x 2 collected on 1/5/24 pending   -Repeat urine culture with yeast seen   -Urology consulted and signed off on 1/5/24, appreciate their assistance  -low suspicion that sepsis is from his catheter  -Awaiting CT scan of abdomen to r/o abscess   -Supra pubic bladder catheter exchanged 1/5/24   -Nephrology evaluated pt and signed off at this time. Appreciate their assistance  -Patient's family discussing potential [place], we will discuss with case management in the morning if this is possible  -Family elected to change pt's code status to chemical code only.      CODE STATUS: Partial Code - chemical code   Consults: Urology, Nephrology, Cardiology  Access: Peripheral  Drains: suprapubic catheter replaced 1/5/24   Diet: Diet NPO  Fluids: none  Pressors: None   Oxygenation: CPAP w Fio2 70  DVT Prophylaxis: Heparin  GI Prophylaxis: none  I&Os: pending 01/07 0701 - 01/08 0700  In: 433.2 [I.V.:114.4]  Out: 585  [Urine:585]        32 minutes of critical care was time spent personally by me on the following activities: development of treatment plan with patient or surrogate and bedside caregivers, discussions with consultants, evaluation of patient's response to treatment, examination of patient, ordering and performing treatments and interventions, ordering and review of laboratory studies, ordering and review of radiographic studies, pulse oximetry, re-evaluation of patient's condition.  This critical care time did not overlap with that of any other provider or involve time for any procedures.     Alexander Andrade MD  Pulmonary Critical Care Medicine  [facility] - [address] [facility] [address]   DOS: 01/08/2024

## 2024-01-08 NOTE — PROGRESS NOTES
[facility]    Cardiology  Progress Note    Patient Name: Francis Montero  MRN: [id number]  Admission Date: 1/2/2024  Hospital Length of Stay: 6 days  Code Status: Partial Code   Attending Physician: Gurdeep Shah MD   Primary Care Physician: Lisa, Primary Doctor  Expected Discharge Date:   Principal Problem:SOB (shortness of breath)    Subjective:   Consultation Reason: Abnormal Troponin & AF RVR     HPI: Mr. Montero is a 89 y/o male,  unknown to [facility] (Followed by Dr. Major in [place]), who presented to [facility] with c/o SOB. Admit VS- BP 70/30, P RA sat 85%. He was given 200ml NS enroute. In Ed he was given another 1.5L NS bolus with improvement in BP. Lab significant for WBC 13.89, K+ 5.7, CO2 16, BUN/creatinine 83.4/3.10, Mg 1.50, BNP 1970, troponin 0.236, Venous lactate 3.3. EKG SR with PACs. CXR consistent with pulmonary congestion and a large bilateral pleural effusions. Echo revealing EF 60-65% with mild to  moderate AS, mild MS and mild to moderate TR. Blood and urine cultures obtained and patient started on antibiotics. [facility] has been consulted for elevated troponin    Hospital Course:  1.3.24: NAD Noted. On BIPAP. He is awake but confused, requiring Mittens. AF RVR. On Amiodarone Infusion, He has a history of AF. Hypotensive Requiring Levophed Support. Legs are Warm.    1.4.24: BIPAP Support. Awake. AF RVR. Requiring Levophed Support. On Amiodarone Infusion. On Heparin Infusion for Stroke Risk Reduction Related to AF.   1.5.24: BIPAP. AF RVR. Remains on Levophed. Septic status noted. Family at bedside. Thrombocytopenia noted, Will discontinue Heparin. Also will discontinue Amiodarone Infusion.   1.6.24: NAD. Marginal BP. CPAP. AFIB CVR. On Pressors. Thrombocytopenia worse.   1.7.24: NAD. BP improved - currently off pressures. On CPAP. AFIB currently CVR. Thrombocytopenia continues to worse. Hypothermia per documentation has improved   1.8.24: NAD. BiPAP. NSR with PACS/PAF with CVR. PLT 97.     PMH:  Afib/Eliquis, COPD, asbestosis, Merkel cell carcinoma of left ear anxiety, esophagitis, esophageal varices, hiatal hernia, merkel cell carcinoma, prostate cancer, chronic UTIs, HLD, PUD  PSH: surgical resection with adjuvant radiotherapy to Left ear,  EGD, prostate surgery,  open shoulder rotator cuff repair  Family History: Reviewed and Unremarkable for Heart Disease  Social History: Former Smoker 40 Pack Years     Previous Cardiac Diagnostics:   Echocardiogram (1.2.24):  Left Ventricle: The left ventricle is normal in size. Normal wall thickness. Normal wall motion. There is normal systolic function with a visually estimated ejection fraction of 60 - 65%. Diastolic function cannot be reliably determined in the presence of mitral valve disease. Inconclusive left ventricular filling pressure.  Right Ventricle: Normal right ventricular cavity size. Systolic function is normal.  Aortic Valve: There is mild to moderate stenosis. Aortic valve area by VTI is 1.23 cm². Aortic valve peak velocity is 2.63 m/s. Mean gradient is 18 mmHg. The dimensionless index is 0.39. There is trace aortic regurgitation.  Mitral Valve: There is severe mitral annular calcification present. There is mild stenosis. The mean pressure gradient across the mitral valve is 6 mmHg at a heart rate of  bpm.  Tricuspid Valve: There is mild to moderate regurgitation. The estimated PA systolic pressure is at least 38 mmHg.     Echocardiogram (2.22.22):  Left Ventricle: Left ventricle is normal in size and function. Normal   wall thickness.   Right Ventricle: Right ventricle is normal in size and function. Normal   wall thickness.   Mitral Valve: Mitral valve is normal in structure and function. Mildly   calcified leaflets.   Tricuspid Valve: Tricuspid valve is normal size and function.   Aortic Valve: Aortic valve is normal in structure and function.   Moderately calcified cusps.      Review of patient's allergies indicates:  No Known Allergies    Review  of Systems   Unable to perform ROS: Acuity of condition     Objective:     Vital Signs (Most Recent):  Temp: 96.8 °F (36 °C) (01/08/24 1200)  Pulse: 73 (01/08/24 1425)  Resp: 15 (01/08/24 1425)  BP: (!) 132/58 (01/08/24 1302)  SpO2: (!) 94 % (01/08/24 1425) Vital Signs (24h Range):  Temp:  [96.8 °F (36 °C)-98.3 °F (36.8 °C)] 96.8 °F (36 °C)  Pulse:  [41-91] 73  Resp:  [12-28] 15  SpO2:  [87 %-100 %] 94 %  BP: (100-145)/() 132/58     Weight: 72.5 kg (159 lb 13.3 oz)  Body mass index is 26.6 kg/m².    SpO2: (!) 94 %         Intake/Output Summary (Last 24 hours) at 1/8/2024 1532  Last data filed at 1/8/2024 0509  Gross per 24 hour   Intake 138.6 ml   Output 435 ml   Net -296.4 ml       Lines/Drains/Airways       Peripherally Inserted Central Catheter Line  Duration             PICC Triple Lumen 01/04/24 1725 left basilic 3 days              Drain  Duration                  NG/OG Tube 01/05/24 1500 Ray sump 16 Fr. Left nostril 3 days         Suprapubic Catheter 01/05/24 1720 24 Fr. 2 days              Peripheral Intravenous Line  Duration                  Peripheral IV - Single Lumen 01/04/24 1459 20 G Anterior;Left;Proximal Forearm 4 days                  Significant Labs:   Recent Results (from the past 72 hour(s))   Urinalysis, Reflex to Urine Culture    Collection Time: 01/05/24  6:00 PM    Specimen: Urine   Result Value Ref Range    Color, UA Light-Yellow Yellow, Light-Yellow, Colorless, Straw, Dark-Yellow    Appearance, UA Turbid (A) Clear    Specific Gravity, UA 1.019 1.005 - 1.030    pH, UA 5.5 5.0 - 8.5    Protein, UA 1+ (A) Negative    Glucose, UA Normal Negative, Normal    Ketones, UA Negative Negative    Blood, UA 2+ (A) Negative    Bilirubin, UA Negative Negative    Urobilinogen, UA Normal 0.2, 1.0, Normal    Nitrites, UA Negative Negative    Leukocyte Esterase,  (A) Negative    WBC, UA 21-50 (A) None Seen, 0-2, 3-5, 0-5 /HPF    Bacteria, UA Trace None Seen, Trace /HPF    Squamous Epithelial  Cells, UA None Seen None Seen /HPF    RBC, UA 50-99 (A) None Seen, 0-2, 3-5, 0-5 /HPF   Urine culture    Collection Time: 01/05/24  6:00 PM    Specimen: Urine   Result Value Ref Range    Urine Culture 10,000 - 25,000 colonies/ml Candida parapsilosis (A)    APTT    Collection Time: 01/06/24  1:59 AM   Result Value Ref Range    PTT 35.0 (H) 23.2 - 33.7 seconds   CBC with Differential    Collection Time: 01/06/24  1:59 AM   Result Value Ref Range    WBC 8.42 4.50 - 11.50 x10(3)/mcL    RBC 4.72 4.70 - 6.10 x10(6)/mcL    Hgb 12.4 (L) 14.0 - 18.0 g/dL    Hct 39.7 (L) 42.0 - 52.0 %    MCV 84.1 80.0 - 94.0 fL    MCH 26.3 (L) 27.0 - 31.0 pg    MCHC 31.2 (L) 33.0 - 36.0 g/dL    RDW 16.0 11.5 - 17.0 %    Platelet 92 (L) 130 - 400 x10(3)/mcL    MPV 11.9 (H) 7.4 - 10.4 fL    Neut % 87.9 %    Lymph % 7.4 %    Mono % 3.3 %    Eos % 0.1 %    Basophil % 0.6 %    Lymph # 0.62 0.6 - 4.6 x10(3)/mcL    Neut # 7.40 2.1 - 9.2 x10(3)/mcL    Mono # 0.28 0.1 - 1.3 x10(3)/mcL    Eos # 0.01 0 - 0.9 x10(3)/mcL    Baso # 0.05 <=0.2 x10(3)/mcL    IG# 0.06 (H) 0 - 0.04 x10(3)/mcL    IG% 0.7 %    NRBC% 0.0 %    IPF 5.9 0.9 - 11.2 %   APTT    Collection Time: 01/07/24  2:59 AM   Result Value Ref Range    PTT 35.4 (H) 23.2 - 33.7 seconds   CBC with Differential    Collection Time: 01/07/24  2:59 AM   Result Value Ref Range    WBC 8.57 4.50 - 11.50 x10(3)/mcL    RBC 4.49 (L) 4.70 - 6.10 x10(6)/mcL    Hgb 11.8 (L) 14.0 - 18.0 g/dL    Hct 38.8 (L) 42.0 - 52.0 %    MCV 86.4 80.0 - 94.0 fL    MCH 26.3 (L) 27.0 - 31.0 pg    MCHC 30.4 (L) 33.0 - 36.0 g/dL    RDW 16.3 11.5 - 17.0 %    Platelet 89 (L) 130 - 400 x10(3)/mcL    MPV 11.3 (H) 7.4 - 10.4 fL    Neut % 88.2 %    Lymph % 7.7 %    Mono % 2.8 %    Eos % 0.2 %    Basophil % 0.2 %    Lymph # 0.66 0.6 - 4.6 x10(3)/mcL    Neut # 7.55 2.1 - 9.2 x10(3)/mcL    Mono # 0.24 0.1 - 1.3 x10(3)/mcL    Eos # 0.02 0 - 0.9 x10(3)/mcL    Baso # 0.02 <=0.2 x10(3)/mcL    IG# 0.08 (H) 0 - 0.04 x10(3)/mcL    IG% 0.9 %     NRBC% 0.0 %   Comprehensive Metabolic Panel    Collection Time: 01/07/24  2:59 AM   Result Value Ref Range    Sodium Level 146 132 - 146 mmol/L    Potassium Level 3.6 3.5 - 5.1 mmol/L    Chloride 113 (H) 98 - 111 mmol/L    Carbon Dioxide 20 (L) 23 - 31 mmol/L    Glucose Level 100 75 - 121 mg/dL    Blood Urea Nitrogen 95.0 (H) 8.4 - 25.7 mg/dL    Creatinine 2.81 (H) 0.73 - 1.18 mg/dL    Calcium Level Total 9.0 8.8 - 10.0 mg/dL    Protein Total 5.0 (L) 5.8 - 7.6 gm/dL    Albumin Level 1.8 (L) 3.4 - 4.8 g/dL    Globulin 3.2 2.4 - 3.5 gm/dL    Albumin/Globulin Ratio 0.6 (L) 1.1 - 2.0 ratio    Bilirubin Total 0.5 <=1.5 mg/dL    Alkaline Phosphatase 80 40 - 150 unit/L    Alanine Aminotransferase 11 0 - 55 unit/L    Aspartate Aminotransferase 30 5 - 34 unit/L    eGFR 20 mls/min/1.73/m2   RT Blood Gas    Collection Time: 01/07/24 11:30 PM   Result Value Ref Range    Sample Type Arterial Blood     Sample site Right Radial Artery     Drawn by Pantera Esparza     pH, Blood gas 7.340 (L) 7.350 - 7.450    pCO2, Blood gas 43.0 35.0 - 45.0 mmHg    pO2, Blood gas 64.0 (L) 80.0 - 100.0 mmHg    Sodium, Blood Gas 140 137 - 145 mmol/L    Potassium, Blood Gas 3.4 (L) 3.5 - 5.0 mmol/L    Calcium Level Ionized 1.34 (H) 1.12 - 1.23 mmol/L    TOC2, Blood gas 24.5 mmol/L    Base Excess, Blood gas -2.50 (L) -2.00 - 2.00 mmol/L    sO2, Blood gas 90.6 %    HCO3, Blood gas 23.2 22.0 - 26.0 mmol/L    THb, Blood gas 11.2 (L) 12 - 16 g/dL    O2 Hb, Blood Gas 91.6 (L) 94.0 - 97.0 %    CO Hgb 0.0 (L) 0.5 - 1.5 %    Met Hgb 0.2 (L) 0.4 - 1.5 %    Allens Test Yes     MODE CPAP     FIO2, Blood gas 60 %    CPAP 10 cm H2O   APTT    Collection Time: 01/08/24  2:37 AM   Result Value Ref Range    PTT 35.1 (H) 23.2 - 33.7 seconds   CBC with Differential    Collection Time: 01/08/24  2:37 AM   Result Value Ref Range    WBC 11.44 4.50 - 11.50 x10(3)/mcL    RBC 4.47 (L) 4.70 - 6.10 x10(6)/mcL    Hgb 11.9 (L) 14.0 - 18.0 g/dL    Hct 37.5 (L) 42.0 - 52.0 %    MCV  83.9 80.0 - 94.0 fL    MCH 26.6 (L) 27.0 - 31.0 pg    MCHC 31.7 (L) 33.0 - 36.0 g/dL    RDW 16.3 11.5 - 17.0 %    Platelet 97 (L) 130 - 400 x10(3)/mcL    MPV 12.0 (H) 7.4 - 10.4 fL    Neut % 90.0 %    Lymph % 5.2 %    Mono % 3.1 %    Eos % 0.3 %    Basophil % 0.2 %    Lymph # 0.59 (L) 0.6 - 4.6 x10(3)/mcL    Neut # 10.31 (H) 2.1 - 9.2 x10(3)/mcL    Mono # 0.35 0.1 - 1.3 x10(3)/mcL    Eos # 0.03 0 - 0.9 x10(3)/mcL    Baso # 0.02 <=0.2 x10(3)/mcL    IG# 0.14 (H) 0 - 0.04 x10(3)/mcL    IG% 1.2 %    NRBC% 0.0 %   Comprehensive Metabolic Panel    Collection Time: 01/08/24  5:42 AM   Result Value Ref Range    Sodium Level 146 132 - 146 mmol/L    Potassium Level 3.8 3.5 - 5.1 mmol/L    Chloride 115 (H) 98 - 111 mmol/L    Carbon Dioxide 21 (L) 23 - 31 mmol/L    Glucose Level 103 75 - 121 mg/dL    Blood Urea Nitrogen 98.0 (H) 8.4 - 25.7 mg/dL    Creatinine 2.52 (H) 0.73 - 1.18 mg/dL    Calcium Level Total 9.1 8.8 - 10.0 mg/dL    Protein Total 5.1 (L) 5.8 - 7.6 gm/dL    Albumin Level 1.9 (L) 3.4 - 4.8 g/dL    Globulin 3.2 2.4 - 3.5 gm/dL    Albumin/Globulin Ratio 0.6 (L) 1.1 - 2.0 ratio    Bilirubin Total 0.6 <=1.5 mg/dL    Alkaline Phosphatase 103 40 - 150 unit/L    Alanine Aminotransferase 13 0 - 55 unit/L    Aspartate Aminotransferase 31 5 - 34 unit/L    eGFR 23 mls/min/1.73/m2     Telemetry: SR/PACs    Physical Exam  Vitals reviewed.   Constitutional:       General: He is not in acute distress.     Appearance: He is ill-appearing.      Comments: BiPAP   HENT:      Head: Normocephalic.      Mouth/Throat:      Mouth: Mucous membranes are dry.   Eyes:      Conjunctiva/sclera: Conjunctivae normal.   Cardiovascular:      Rate and Rhythm: Normal rate. Rhythm irregular.   Pulmonary:      Effort: Pulmonary effort is normal. No respiratory distress.      Breath sounds: Rhonchi present.      Comments: BiPAP  Musculoskeletal:      Right lower leg: No edema.      Left lower leg: No edema.      Comments: Legs are Warm   Skin:      General: Skin is warm.   Neurological:      Mental Status: He is alert. He is confused.      Comments: Awake/Alert with Intermittent AMS   Psychiatric:      Comments: Mittens         Current Inpatient Medications:  Current Facility-Administered Medications:     0.9%  NaCl infusion, , Intravenous, Continuous, Bernardino Ordoñez MD, Last Rate: 10 mL/hr at 01/07/24 1800, Rate Verify at 01/07/24 1800    acetaminophen tablet 1,000 mg, 1,000 mg, Oral, Q6H PRN, Grisel Valentine, FNP, 1,000 mg at 01/02/24 1630    acetaminophen tablet 650 mg, 650 mg, Oral, Q4H PRN, Grisel Valentine, FNP, 650 mg at 01/07/24 1447    albuterol nebulizer solution, 10 mg/hr, Nebulization, Continuous, Mendoza Mckeon MD, Last Rate: 12 mL/hr at 01/02/24 1256, 10 mg/hr at 01/02/24 1256    albuterol-ipratropium 2.5 mg-0.5 mg/3 mL nebulizer solution 3 mL, 3 mL, Nebulization, Q4H PRN, Grisel Valentine, FNP, 3 mL at 01/03/24 0229    [COMPLETED] amiodarone tablet 400 mg, 400 mg, Oral, BID, 400 mg at 01/08/24 0928 **FOLLOWED BY** amiodarone tablet 200 mg, 200 mg, Oral, BID **FOLLOWED BY** [START ON 1/11/2024] amiodarone tablet 200 mg, 200 mg, Oral, Daily, Charles Taveras, DELP    atorvastatin tablet 10 mg, 10 mg, Oral, Daily, Grisel Valentine, DELP, 10 mg at 01/08/24 0929    calcitRIOL capsule 0.25 mcg, 0.25 mcg, Per NG tube, Daily, Gurdeep Shah MD, 0.25 mcg at 01/08/24 0929    cefTRIAXone (ROCEPHIN) 2 g in dextrose 5 % in water (D5W) 100 mL IVPB (MB+), 2 g, Intravenous, Q24H, Juan Sanchez MD, Stopped at 01/08/24 1323    famotidine (PF) injection 20 mg, 20 mg, Intravenous, Daily, Gurdeep Shah MD, 20 mg at 01/08/24 0928    gabapentin capsule 300 mg, 300 mg, Oral, QHS, Grisel Valentine FNP, 300 mg at 01/07/24 2024    melatonin tablet 6 mg, 6 mg, Oral, Nightly PRN, Grisel Valentine FNP, 6 mg at 01/07/24 2024    metoprolol injection 5 mg, 5 mg, Intravenous, Q5 Min PRN, Wendy Gabriel FNP    morphine injection 1 mg,  1 mg, Intravenous, Q8H PRN, Thomas Palacios MD, 1 mg at 01/05/24 0053    naloxone 0.4 mg/mL injection 0.02 mg, 0.02 mg, Intravenous, PRN, Grisel Valentine FNP    NORepinephrine 8 mg in dextrose 5% 250 mL infusion, 0-3 mcg/kg/min, Intravenous, Continuous, Fadumo Ramirez DO, Stopped at 01/06/24 0434    ondansetron injection 4 mg, 4 mg, Intravenous, Q4H PRN, Grisel Valentine, FNP    oxybutynin tablet 5 mg, 5 mg, Oral, TID, Arianna Ornelas, AGACNP-BC    prochlorperazine injection Soln 5 mg, 5 mg, Intravenous, Q6H PRN, Grisel Valentine, NADIR    simethicone chewable tablet 80 mg, 1 tablet, Oral, QID PRN, Grisel Valentine, NADIR    sodium chloride 0.9% flush 10 mL, 10 mL, Intravenous, PRN, Grisel Valentine, FNP    sodium chloride 0.9% flush 10 mL, 10 mL, Intravenous, PRN, Manny Sawant,     Flushing PICC/Midline Protocol, , , Until Discontinued **AND** sodium chloride 0.9% flush 10 mL, 10 mL, Intravenous, Q6H, 10 mL at 01/08/24 1254 **AND** sodium chloride 0.9% flush 10 mL, 10 mL, Intravenous, PRN, Bernardino Ordoñez MD    VTE Risk Mitigation (From admission, onward)           Ordered     Reason for No Pharmacological VTE Prophylaxis  Once        Question:  Reasons:  Answer:  Already adequately anticoagulated on oral Anticoagulants    01/02/24 1329     IP VTE HIGH RISK PATIENT  Once         01/02/24 1329     Place sequential compression device  Until discontinued         01/02/24 1329                  Assessment:   Acute on Chronic Diastolic Heart Failure    - EF 60-65%  Atrial Fibrillation (Unspecified) - Now SR/PACs    - CHADsVASc - 4 Points - 4.8% Stroke Risk per Year     - On Eliquis Outpatient - Heparin Infusion Discontinued 2/2 Thrombocytopenia   Hypotension requiring Pressors - Resolved   Thrombocytopenia - Improving   CAP  Bilateral Pleural Effusion (Right > Left)- ? Right Empyema     - ID Following   Acute Respiratory Failure requiring CPAP /BiPAP  NSTEMI- Type II in the  Setting of Heart Failure & Septic Shock  Septic Shock Requiring Vasopressor Support    - Strept Bacteremia/Urine Culture Positive for Klebsiella Pneumoniae   Valvular Heart Disease    - AS: Mild to Moderate, TR: Mild to Moderate, MS: Mild with Severe MAC  Acute Kidney Failure/CKD  Lactic Acidosis (Resolved)  Confusion    - CT Head Normal  UTI    - Chronic Ureteral Stent  Hyperlipidemia    - On Statin   History of Esophageal Varices  COPD  History of Merkel Cell Carcinoma on Ear  History of Prostate Cancer  COVID-19 & Influenza Negative on 1.2.24  No known History of GI Bleed    Plan:   Continue Amiodarone Taper and Statin   Antibiotic Management as per ID Team  PRN Lopressor 5 mg for Sustained HR > 120  Hold off on Oral BB 2/2 Bradycardia  Supportive Care as per ICU Team.   Labs in AM: CBC, CMP, and Mg     AGNIESZKA Calixto  Cardiology  [facility]  01/08/2024     Physician addendum:  I have seen and examined this patient as a split-shared visit with the DAYANA d/t complicated medical management of above problems written in assessment and high acuity requiring physician expertise in medical decision-making. I performed the substantive portion of the history and exam. Above medical decision-making is also formulated by me.    Cardiovascular exam:  S1, S2  Lungs:  fine crackles at bases.  Extremities:  1+ edema bilaterally    Plan:  Medications as above.  Supportive therapy as per ICU.  As per heart failure patient is stable.  We will sign off.      Mukund Melo MD  Cardiologist

## 2024-01-09 PROBLEM — R07.9 CHEST PAIN: Status: ACTIVE | Noted: 2024-01-09

## 2024-01-09 PROBLEM — Z91.89 AT HIGH RISK FOR FLUID OVERLOAD: Status: ACTIVE | Noted: 2024-01-09

## 2024-01-09 PROBLEM — J13 PNEUMONIA OF RIGHT LUNG DUE TO STREPTOCOCCUS PNEUMONIAE: Status: ACTIVE | Noted: 2024-01-09

## 2024-01-09 PROBLEM — R57.9 SHOCK: Status: ACTIVE | Noted: 2024-01-09

## 2024-01-09 PROBLEM — J96.01 ACUTE RESPIRATORY FAILURE WITH HYPOXIA: Status: ACTIVE | Noted: 2024-01-09

## 2024-01-09 PROBLEM — R78.81 BACTEREMIA DUE TO STREPTOCOCCUS PNEUMONIAE: Status: ACTIVE | Noted: 2024-01-09

## 2024-01-09 PROBLEM — B95.3 BACTEREMIA DUE TO STREPTOCOCCUS PNEUMONIAE: Status: ACTIVE | Noted: 2024-01-09

## 2024-01-09 PROBLEM — I50.9 ACUTE ON CHRONIC CONGESTIVE HEART FAILURE: Status: ACTIVE | Noted: 2024-01-09

## 2024-01-09 PROBLEM — R78.81 BACTEREMIA: Status: ACTIVE | Noted: 2024-01-09

## 2024-01-09 LAB
ALBUMIN SERPL-MCNC: 1.7 G/DL (ref 3.4–4.8)
ALBUMIN/GLOB SERPL: 0.5 RATIO (ref 1.1–2)
ALP SERPL-CCNC: 107 UNIT/L (ref 40–150)
ALT SERPL-CCNC: 12 UNIT/L (ref 0–55)
APTT PPP: 32.7 SECONDS (ref 23.2–33.7)
AST SERPL-CCNC: 29 UNIT/L (ref 5–34)
BASOPHILS # BLD AUTO: 0.02 X10(3)/MCL
BASOPHILS NFR BLD AUTO: 0.1 %
BILIRUB SERPL-MCNC: 0.4 MG/DL
BUN SERPL-MCNC: 94.5 MG/DL (ref 8.4–25.7)
CALCIUM SERPL-MCNC: 9.1 MG/DL (ref 8.8–10)
CHLORIDE SERPL-SCNC: 120 MMOL/L (ref 98–111)
CO2 SERPL-SCNC: 23 MMOL/L (ref 23–31)
CREAT SERPL-MCNC: 2.21 MG/DL (ref 0.73–1.18)
EOSINOPHIL # BLD AUTO: 0.03 X10(3)/MCL (ref 0–0.9)
EOSINOPHIL NFR BLD AUTO: 0.2 %
ERYTHROCYTE [DISTWIDTH] IN BLOOD BY AUTOMATED COUNT: 16.5 % (ref 11.5–17)
GFR SERPLBLD CREATININE-BSD FMLA CKD-EPI: 27 MLS/MIN/1.73/M2
GLOBULIN SER-MCNC: 3.1 GM/DL (ref 2.4–3.5)
GLUCOSE SERPL-MCNC: 122 MG/DL (ref 75–121)
HCT VFR BLD AUTO: 36.4 % (ref 42–52)
HGB BLD-MCNC: 11.4 G/DL (ref 14–18)
IMM GRANULOCYTES # BLD AUTO: 0.2 X10(3)/MCL (ref 0–0.04)
IMM GRANULOCYTES NFR BLD AUTO: 1.4 %
LYMPHOCYTES # BLD AUTO: 0.67 X10(3)/MCL (ref 0.6–4.6)
LYMPHOCYTES NFR BLD AUTO: 4.5 %
MAGNESIUM SERPL-MCNC: 2.3 MG/DL (ref 1.6–2.6)
MCH RBC QN AUTO: 26.6 PG (ref 27–31)
MCHC RBC AUTO-ENTMCNC: 31.3 G/DL (ref 33–36)
MCV RBC AUTO: 84.8 FL (ref 80–94)
MONOCYTES # BLD AUTO: 0.35 X10(3)/MCL (ref 0.1–1.3)
MONOCYTES NFR BLD AUTO: 2.4 %
NEUTROPHILS # BLD AUTO: 13.47 X10(3)/MCL (ref 2.1–9.2)
NEUTROPHILS NFR BLD AUTO: 91.4 %
NRBC BLD AUTO-RTO: 0.1 %
PLATELET # BLD AUTO: 138 X10(3)/MCL (ref 130–400)
PLATELETS.RETICULATED NFR BLD AUTO: 6.5 % (ref 0.9–11.2)
PMV BLD AUTO: 12 FL (ref 7.4–10.4)
POTASSIUM SERPL-SCNC: 3.8 MMOL/L (ref 3.5–5.1)
PROT SERPL-MCNC: 4.8 GM/DL (ref 5.8–7.6)
RBC # BLD AUTO: 4.29 X10(6)/MCL (ref 4.7–6.1)
SODIUM SERPL-SCNC: 152 MMOL/L (ref 132–146)
WBC # SPEC AUTO: 14.74 X10(3)/MCL (ref 4.5–11.5)

## 2024-01-09 PROCEDURE — 25000003 PHARM REV CODE 250: Performed by: NURSE PRACTITIONER

## 2024-01-09 PROCEDURE — A4216 STERILE WATER/SALINE, 10 ML: HCPCS | Performed by: INTERNAL MEDICINE

## 2024-01-09 PROCEDURE — 97166 OT EVAL MOD COMPLEX 45 MIN: CPT

## 2024-01-09 PROCEDURE — 27000221 HC OXYGEN, UP TO 24 HOURS

## 2024-01-09 PROCEDURE — 27100171 HC OXYGEN HIGH FLOW UP TO 24 HOURS

## 2024-01-09 PROCEDURE — 85730 THROMBOPLASTIN TIME PARTIAL: CPT | Performed by: NURSE PRACTITIONER

## 2024-01-09 PROCEDURE — 99291 CRITICAL CARE FIRST HOUR: CPT | Mod: FS,,, | Performed by: GENERAL PRACTICE

## 2024-01-09 PROCEDURE — 25000003 PHARM REV CODE 250: Performed by: INTERNAL MEDICINE

## 2024-01-09 PROCEDURE — 99223 1ST HOSP IP/OBS HIGH 75: CPT | Mod: ,,, | Performed by: NURSE PRACTITIONER

## 2024-01-09 PROCEDURE — 93010 ELECTROCARDIOGRAM REPORT: CPT | Mod: ,,, | Performed by: INTERNAL MEDICINE

## 2024-01-09 PROCEDURE — 83735 ASSAY OF MAGNESIUM: CPT | Performed by: NURSE PRACTITIONER

## 2024-01-09 PROCEDURE — 94761 N-INVAS EAR/PLS OXIMETRY MLT: CPT

## 2024-01-09 PROCEDURE — 94660 CPAP INITIATION&MGMT: CPT

## 2024-01-09 PROCEDURE — 93005 ELECTROCARDIOGRAM TRACING: CPT

## 2024-01-09 PROCEDURE — 20000000 HC ICU ROOM

## 2024-01-09 PROCEDURE — 80053 COMPREHEN METABOLIC PANEL: CPT | Performed by: NURSE PRACTITIONER

## 2024-01-09 PROCEDURE — 97163 PT EVAL HIGH COMPLEX 45 MIN: CPT

## 2024-01-09 PROCEDURE — 99900035 HC TECH TIME PER 15 MIN (STAT)

## 2024-01-09 PROCEDURE — 63600175 PHARM REV CODE 636 W HCPCS: Mod: JZ,JG | Performed by: STUDENT IN AN ORGANIZED HEALTH CARE EDUCATION/TRAINING PROGRAM

## 2024-01-09 PROCEDURE — 85025 COMPLETE CBC W/AUTO DIFF WBC: CPT | Performed by: NURSE PRACTITIONER

## 2024-01-09 PROCEDURE — 99900031 HC PATIENT EDUCATION (STAT)

## 2024-01-09 PROCEDURE — 63600175 PHARM REV CODE 636 W HCPCS: Performed by: GENERAL PRACTICE

## 2024-01-09 PROCEDURE — 25000003 PHARM REV CODE 250: Performed by: GENERAL PRACTICE

## 2024-01-09 RX ADMIN — SODIUM CHLORIDE, PRESERVATIVE FREE 10 ML: 5 INJECTION INTRAVENOUS at 12:01

## 2024-01-09 RX ADMIN — AMIODARONE HYDROCHLORIDE 200 MG: 200 TABLET ORAL at 08:01

## 2024-01-09 RX ADMIN — CALCITRIOL CAPSULES 0.25 MCG 0.25 MCG: 0.25 CAPSULE ORAL at 09:01

## 2024-01-09 RX ADMIN — MORPHINE SULFATE 1 MG: 4 INJECTION, SOLUTION INTRAMUSCULAR; INTRAVENOUS at 01:01

## 2024-01-09 RX ADMIN — AMIODARONE HYDROCHLORIDE 200 MG: 200 TABLET ORAL at 09:01

## 2024-01-09 RX ADMIN — OXYBUTYNIN CHLORIDE 5 MG: 5 TABLET ORAL at 05:01

## 2024-01-09 RX ADMIN — ACETAMINOPHEN 1000 MG: 500 TABLET ORAL at 01:01

## 2024-01-09 RX ADMIN — OXYBUTYNIN CHLORIDE 5 MG: 5 TABLET ORAL at 08:01

## 2024-01-09 RX ADMIN — SODIUM CHLORIDE, PRESERVATIVE FREE 10 ML: 5 INJECTION INTRAVENOUS at 06:01

## 2024-01-09 RX ADMIN — CEFTRIAXONE SODIUM 2 G: 2 INJECTION, POWDER, FOR SOLUTION INTRAMUSCULAR; INTRAVENOUS at 12:01

## 2024-01-09 RX ADMIN — GABAPENTIN 300 MG: 300 CAPSULE ORAL at 08:01

## 2024-01-09 RX ADMIN — OXYBUTYNIN CHLORIDE 5 MG: 5 TABLET ORAL at 09:01

## 2024-01-09 RX ADMIN — ATORVASTATIN CALCIUM 10 MG: 10 TABLET, FILM COATED ORAL at 09:01

## 2024-01-09 RX ADMIN — FAMOTIDINE 20 MG: 10 INJECTION, SOLUTION INTRAVENOUS at 09:01

## 2024-01-09 NOTE — PROGRESS NOTES
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  Patient was originally started on IV antibiotics however during his stay began to become progressively hypotensive.  Patient received 3 L fluid boluses which did not improve patient's blood pressure to a map greater than 65 at which point patient was initiated on Levophed for hemodynamic support.  Of note, patient is visiting [place] and reports that he has been feeling somewhat ill the last 2 days.  Admits to dysuria, fatigue, myalgia, shortness a breath.  Reports no chest pain, palpitations, fevers, chills, nausea, vomiting.  ICU was consulted for upgrade.    Hospital Course/Significant events:  01/01/2024: Admitted to [facility]  01/02/2024: Upgraded to ICU, initiated on Levophed  01/04/2024: PICC line placed   01/05/2024: Suprapubic catheter exchanged    24 Hour Interval History:  No acute events occurred overnight. Vital signs remained stable and patient is afebrile. Abx changed yesterday to Rocephin. Remains on CPAP, prognosis discussed with family yesterday. ID still would like CT scan once pt stabilizes. AM labs pending.     Past Medical History:   Diagnosis Date    CKD (chronic kidney disease)     COPD (chronic obstructive pulmonary disease)     HLD (hyperlipidemia)     HTN (hypertension)     Merkel cell carcinoma     Paroxysmal A-fib     Prostate cancer      PUD (peptic ulcer disease)      Past Surgical History:   Procedure Laterality Date    INGUINAL HERNIA REPAIR      PROSTATE SURGERY      ROTATOR CUFF REPAIR       Social History     Socioeconomic History    Marital status:      Social Determinants of Health     Housing Stability: Unknown (1/3/2024)    Housing Stability Vital Sign     Unable to Pay for Housing in the Last Year: No     Current Outpatient Medications   Medication Instructions    atorvastatin (LIPITOR) 10 mg, Oral, Daily    calcitRIOL (ROCALTROL) 0.25 mcg, Oral, Daily, Takes every other day    gabapentin (NEURONTIN) 300 mg, Oral, Nightly    metoprolol tartrate (LOPRESSOR) 25 mg, Oral, 2 times daily    NON FORMULARY MEDICATION 850 mg, Oral, Daily, Umary     Current Inpatient Medications   amiodarone  200 mg Oral BID    Followed by    [START ON 1/11/2024] amiodarone  200 mg Oral Daily    atorvastatin  10 mg Oral Daily    calcitRIOL  0.25 mcg Per NG tube Daily    cefTRIAXone (ROCEPHIN) IVPB  2 g Intravenous Q24H    famotidine (PF)  20 mg Intravenous Daily    gabapentin  300 mg Oral QHS    oxybutynin  5 mg Oral TID    sodium chloride 0.9%  10 mL Intravenous Q6H     Current Intravenous Infusions   sodium chloride 0.9% 10 mL/hr at 01/07/24 1800    albuterol 0.083% 10 mg/hr (01/02/24 1256)    NORepinephrine bitartrate-D5W Stopped (01/06/24 0434)       Review of Systems   Reason unable to perform ROS: due to critical illness.      Objective:       Intake/Output Summary (Last 24 hours) at 1/9/2024 0404  Last data filed at 1/8/2024 1801  Gross per 24 hour   Intake --   Output 440 ml   Net -440 ml     Vital Signs (Most Recent):  Temp: 97.7 °F (36.5 °C) (01/08/24 2100)  Pulse: (!) 57 (01/08/24 2100)  Resp: 19 (01/09/24 0123)  BP: (!) 122/54 (01/08/24 2100)  SpO2: (!) 93 % (01/08/24 2100)  Body mass index is 26.6 kg/m².  Weight: 72.5 kg (159 lb 13.3 oz) Vital Signs (24h Range):  Temp:  [96.8 °F (36 °C)-97.7 °F (36.5 °C)] 97.7 °F (36.5 °C)  Pulse:  [41-76]  57  Resp:  [14-28] 19  SpO2:  [76 %-100 %] 93 %  BP: (109-148)/(49-88) 122/54     Physical Exam  Constitutional:       General: He is not in acute distress.     Appearance: He is ill-appearing.      Comments: Pulling at mitts and rolling around in bed on exam.   HENT:      Head:      Comments: CPAP in place. PICC line placed, dressing intact.   Cardiovascular:      Rate and Rhythm: Tachycardia present.      Pulses: Normal pulses.      Heart sounds: Murmur heard.      No friction rub. No gallop.      Comments: Systolic murmur noted  Pulmonary:      Effort: Pulmonary effort is normal.      Breath sounds: No stridor. Rhonchi and rales present. No wheezing.   Abdominal:      General: Bowel sounds are normal.      Palpations: Abdomen is soft.      Tenderness: There is no abdominal tenderness. There is no guarding.   Musculoskeletal:         General: No swelling.      Right lower leg: No edema.      Left lower leg: No edema.   Neurological:      General: No focal deficit present.      Mental Status: He is alert and oriented to person, place, and time.       Lines/Drains/Airways       Peripherally Inserted Central Catheter Line  Duration             PICC Triple Lumen 01/04/24 1725 left basilic 4 days              Drain  Duration                  NG/OG Tube 01/05/24 1500 [place] sump 16 Fr. Left nostril 3 days         Suprapubic Catheter 01/05/24 1720 24 Fr. 3 days              Peripheral Intravenous Line  Duration                  Peripheral IV - Single Lumen 01/04/24 1459 20 G Anterior;Left;Proximal Forearm 4 days                    Significant Labs:  Lab Results   Component Value Date    WBC 11.44 01/08/2024    HGB 11.9 (L) 01/08/2024    HCT 37.5 (L) 01/08/2024    MCV 83.9 01/08/2024    PLT 97 (L) 01/08/2024     BMP  Lab Results   Component Value Date     01/08/2024    K 3.8 01/08/2024    CHLORIDE 115 (H) 01/08/2024    CO2 21 (L) 01/08/2024    BUN 98.0 (H) 01/08/2024    CREATININE 2.52 (H) 01/08/2024    CALCIUM 9.1  01/08/2024     ABG  Recent Labs   Lab 01/07/24  2330   PH 7.340*   PO2 64.0*   PCO2 43.0   HCO3 23.2   POCBASEDEF -2.50*     Mechanical Ventilation Support:  Oxygen Concentration (%): 70 (01/09/24 0007)    Significant Imaging:  Imaging Results              X-Ray Chest AP Portable (Final result)  Result time 01/02/24 21:00:06      Final result by Darrius Mcpherson MD (01/02/24 21:00:06)                   Impression:      Changes most consistent with pulmonary edema and a large right-sided pleural effusion.  This appears grossly stable from the previous exam      Electronically signed by: Darrius Mcpherson MD  Date:    01/02/2024  Time:    21:00               Narrative:    EXAMINATION:  XR CHEST AP PORTABLE    CLINICAL HISTORY:  Shortness of breath    TECHNIQUE:  Single frontal view of the chest was performed.    COMPARISON:  01/02/2024    FINDINGS:  There is significant opacification of the right hemithorax cannot rule out a large effusion.  There are calcified pleural plaques suggestive of previous X best is exposure.  There are increased markings bilaterally most consistent with pulmonary edema                                       US Retroperitoneal Complete (Final result)  Result time 01/02/24 15:46:53      Final result by Soren Miranda MD (01/02/24 15:46:53)                   Impression:      1. No hydronephrosis.  2. Medical renal disease.      Electronically signed by: Soren Miranda  Date:    01/02/2024  Time:    15:46               Narrative:    EXAMINATION:  US RETROPERITONEAL COMPLETE    CLINICAL HISTORY:  elevated BUN/Creat;    COMPARISON:  None.    FINDINGS:  Grayscale and color Doppler sonographic evaluation of the kidneys and urinary bladder.    The right kidney measures 8 cm. The left kidney measures 8 cm.   No hydronephrosis.  Heterogeneous renal parenchymal echogenicity with loss of corticomedullary differentiation.    Bladder is not visualized.                                       CT Head Without  Contrast (Final result)  Result time 01/02/24 15:12:29      Final result by Alejandrina Rodriguez MD (01/02/24 15:12:29)                   Impression:      1. No acute intracranial abnormality.  2. Chronic microvascular ischemic changes.      Electronically signed by: Alejandrina Rodriguez  Date:    01/02/2024  Time:    15:12               Narrative:    EXAMINATION:  CT HEAD WITHOUT CONTRAST    CLINICAL HISTORY:  Mental status change, unknown cause;    TECHNIQUE:  Axial scans were obtained from skull base to the vertex.    Coronal and sagittal reconstructions obtained from the axial data.    Automatic exposure control was utilized to limit radiation dose.    Contrast: None    Radiation Dose:    Total DLP: 1029 mGy*cm    COMPARISON:  None    FINDINGS:  There is no acute intracranial hemorrhage or edema.  There is a small area of encephalomalacia in the left cerebellum.    There is no mass effect or midline shift.  There is diffuse parenchymal volume loss.  The basal cisterns are patent. There is no abnormal extra-axial fluid collection.  Carotid artery calcifications are noted.    The calvarium and skull base are intact.  There are bilateral mastoid effusions, right greater than left.                                       X-Ray Chest 1 View (Final result)  Result time 01/02/24 10:38:49      Final result by Eligio Shaikh MD (01/02/24 10:38:49)                   Impression:      Changes suggestive of pulmonary vascular congestion and cardiac decompensation.    Slightly more confluent opacities at the right base infiltrate cannot be completely excluded.    Right-sided pleural effusion      Electronically signed by: Eligio Shaikh  Date:    01/02/2024  Time:    10:38               Narrative:    EXAMINATION:  XR CHEST 1 VIEW    CPT 56160    CLINICAL HISTORY:  SOB;    FINDINGS:  Examination reveals mediastinal silhouette to be within normal limits cardiac silhouette is not enlarged there is increase in interstitial and  pulmonary vascular markings indicating some degree of pulmonary vascular congestion and cardiac decompensation.    Slightly more confluent opacities identified at the right base superimposed infiltrate can not be completely excluded.    There is blunting of the right costophrenic angle representing a right-sided pleural effusion    No other focal consolidative changes                                   I have reviewed the pertinent imaging within the past 24 hours.    Assessment/Plan:     Assessment  Septic Shock - improving  Acute Cystitis without hematuria  -Hx of urinary retension   - hx of urethral stent  -initial urine culture positive for klebsiella pneumoniae  Community Acquired Pneumonia  Bacteremia -strep pneumoniae  Acute Hypoxic on chronic hypercarbic respiratory failure  - hx of COPD  - hx of asbestosis, pulmonary fibrosis?  Right pleural effusion  Acute renal failure  - hx of CKD with unknown baseline  Metabolic Acidosis  - lactic acidosis, respiratory compensation  NSTEMI  HFpEF, decompensated EF 60% with diastolic failure on most recent TTE  Hyperkalemia - resolved  Hypomagnesemia - resolved  Paroxysmal Afib  Hx of prostate cancer  Hx of merkel cell carcinoma      Plan  -Continue critical care level management at this time.   -Currently off pressors, maintain MAP goal >65   -Continue with supplement oxygen, can titrate for goal SpO2 88-92% and wean as tolerated   -Continue Nebulized treatments for wheezing  -Cardiology signed off, appreciate their assistance   -Discontinue heparin due to thrombocytopenia    -Amiodarone oral taper    -Consider digoxin if further HR control if needed with caution due to renal dysfunction    -Hold beta blocker given hypotension, resume with BP improves    -Tolerated Lasix trials   -ID following, appreciate recommendations   -Awaiting blood cultures  -Recommended CT chest   -Retroperitoneal U/S negative for hydronephrosis  -Continue Merrem 1/5/24 as there was no  improvement with Rocephin initially; possible transition back   -Initial BC done on 1/2/24 + Strep Pneumo 2/2 on ; sensitive to gent, meropenem and zosyn  -Repeat blood cultures x 2 collected on 1/5/24 pending   -Repeat urine culture with yeast seen   -Initiated on Rocephin 1/9  -Urology consulted and signed off on 1/5/24, appreciate their assistance  -low suspicion that sepsis is from his catheter  -Awaiting CT scan of abdomen to r/o abscess   -Supra pubic bladder catheter exchanged 1/5/24   -Stated ditropan 1/8  -Nephrology evaluated pt and signed off at this time. Appreciate their assistance  -Patient's family discussing potential transferred to be closer to where they [facility], we will discuss with case management in the morning if this is possible  -Family elected to change pt's code status to chemical code only.      CODE STATUS: Partial Code - chemical code   Consults: Urology, Nephrology, Cardiology  Access: Peripheral  Drains: suprapubic catheter replaced 1/5/24   Diet: Diet NPO  Fluids: none  Pressors: None   Oxygenation: CPAP w Fio2 70  DVT Prophylaxis: Heparin  GI Prophylaxis: none  I&Os: pending 01/08 0701 - 01/09 0700  In: -   Out: 380 [Urine:380]        32 minutes of critical care was time spent personally by me on the following activities: development of treatment plan with patient or surrogate and bedside caregivers, discussions with consultants, evaluation of patient's response to treatment, examination of patient, ordering and performing treatments and interventions, ordering and review of laboratory studies, ordering and review of radiographic studies, pulse oximetry, re-evaluation of patient's condition.  This critical care time did not overlap with that of any other provider or involve time for any procedures.     Alexander Andrade MD  Pulmonary Critical Care Medicine  [facility] - [address] [place] [address]   DOS: 01/09/2024

## 2024-01-09 NOTE — PROGRESS NOTES
.UROLOGY  PROGRESS  NOTE    Francis Montero 3/1/1929  [id number]  1/9/2024    Patient resting in bed  Remains on BiPAP - has not tolerated weaning   Urine leakage from penis much improved with ditropan    550ml UOP overnight  WBC 14.74   H&H 11.4/36.4  BUN and creatinine 94.5/2.21    Repeat UC 1/5 with candida    Intake/Output:  No intake/output data recorded.  I/O last 3 completed shifts:  In: 707.2 [I.V.:310.3; NG/GT:300; IV Piggyback:96.9]  Out: 1265 [Urine:1265]       Exam:    NAD  Resp unlabored, on CPAP  Abd soft, NTND   SPT site c/d/I; brooks yellow urine in  bag; cunningham clamp in place, penile exam normal      Recent Results (from the past 24 hour(s))   APTT    Collection Time: 01/09/24  4:01 AM   Result Value Ref Range    PTT 32.7 23.2 - 33.7 seconds   Comprehensive Metabolic Panel    Collection Time: 01/09/24  4:01 AM   Result Value Ref Range    Sodium Level 152 (H) 132 - 146 mmol/L    Potassium Level 3.8 3.5 - 5.1 mmol/L    Chloride 120 (H) 98 - 111 mmol/L    Carbon Dioxide 23 23 - 31 mmol/L    Glucose Level 122 (H) 75 - 121 mg/dL    Blood Urea Nitrogen 94.5 (H) 8.4 - 25.7 mg/dL    Creatinine 2.21 (H) 0.73 - 1.18 mg/dL    Calcium Level Total 9.1 8.8 - 10.0 mg/dL    Protein Total 4.8 (L) 5.8 - 7.6 gm/dL    Albumin Level 1.7 (L) 3.4 - 4.8 g/dL    Globulin 3.1 2.4 - 3.5 gm/dL    Albumin/Globulin Ratio 0.5 (L) 1.1 - 2.0 ratio    Bilirubin Total 0.4 <=1.5 mg/dL    Alkaline Phosphatase 107 40 - 150 unit/L    Alanine Aminotransferase 12 0 - 55 unit/L    Aspartate Aminotransferase 29 5 - 34 unit/L    eGFR 27 mls/min/1.73/m2   Magnesium    Collection Time: 01/09/24  4:01 AM   Result Value Ref Range    Magnesium Level 2.30 1.60 - 2.60 mg/dL   CBC with Differential    Collection Time: 01/09/24  4:01 AM   Result Value Ref Range    WBC 14.74 (H) 4.50 - 11.50 x10(3)/mcL    RBC 4.29 (L) 4.70 - 6.10 x10(6)/mcL    Hgb 11.4 (L) 14.0 - 18.0 g/dL    Hct 36.4 (L) 42.0 - 52.0 %    MCV 84.8 80.0 - 94.0 fL    MCH 26.6 (L)  27.0 - 31.0 pg    MCHC 31.3 (L) 33.0 - 36.0 g/dL    RDW 16.5 11.5 - 17.0 %    Platelet 138 130 - 400 x10(3)/mcL    MPV 12.0 (H) 7.4 - 10.4 fL    Neut % 91.4 %    Lymph % 4.5 %    Mono % 2.4 %    Eos % 0.2 %    Basophil % 0.1 %    Lymph # 0.67 0.6 - 4.6 x10(3)/mcL    Neut # 13.47 (H) 2.1 - 9.2 x10(3)/mcL    Mono # 0.35 0.1 - 1.3 x10(3)/mcL    Eos # 0.03 0 - 0.9 x10(3)/mcL    Baso # 0.02 <=0.2 x10(3)/mcL    IG# 0.20 (H) 0 - 0.04 x10(3)/mcL    IG% 1.4 %    NRBC% 0.1 %    IPF 6.5 0.9 - 11.2 %         Assessment:  -UTI with chronic ureteral stent and SPT - Cultures with ESBL klebsiella on admit; repeat with candida  -bacteremia, sepsis - cultures with streptococcus  -acute on chronic respiratory failure  -STEPHEN on CKD  -h/o prostate cancer with stricture s/t radiation requiring chronic ureteral stent      Plan:  -Continue ditropan  -No additional recommendations from Urology standpoint at this time  -Please call as needed with any issues  -Discussed with nursing.       Arianna Ornelas, [facility]-BC

## 2024-01-09 NOTE — PT/OT/SLP EVAL
Physical Therapy Evaluation    Patient Name:  Francis Montero   MRN:  [id number]    Recommendations:     Discharge therapy intensity: Moderate Intensity Therapy   Discharge Equipment Recommendations:  (TBD)   Barriers to discharge:  medical dx, impaired mobility, decreased independence     Assessment:     Francis Montero is a 89 y.o. male admitted with a medical diagnosis of sepsis, acute cystitis, hypotension, pneumonia, respiratory failure, R pleural effusion.  He presents with the following impairments/functional limitations: weakness, impaired cardiopulmonary response to activity, impaired endurance, impaired balance, decreased lower extremity function, decreased upper extremity function, impaired self care skills, impaired functional mobility, impaired cognition.  Patient with fair tolerance to PT eval. Difficulty gathering a hx or even just understanding general responses from pt 2/2 bipap. Pt lethargic throughout entirety of session requiring constant VC to keep eyes open to remain awake and engaged. Long sit performed and pt's O2 sats maintain WNL therefore pt mobilized to EOB where sats remained WNL. Limited ax once EOB 2/2 lethargy. Will progress as able.     Rehab Prognosis: Fair; patient would benefit from acute skilled PT services to address these deficits and reach maximum level of function.    Recent Surgery: * No surgery found *      Plan:     During this hospitalization, patient to be seen 5 x/week to address the identified rehab impairments via gait training, therapeutic activities, therapeutic exercises and progress toward the following goals:    Plan of Care Expires:  02/09/24    Subjective     Chief Complaint: n/a  Patient/Family Comments/goals: to get stronger   Pain/Comfort:  Pain Rating 1: 0/10    Patients cultural, spiritual, Sabianist conflicts given the current situation: no    Living Environment:  Unknown at this time. Pt unable to provide 2/2 difficulty speaking d/t bipap      Objective:      Communicated with NSG prior to session.  Patient found HOB elevated with blood pressure cuff, pulse ox (continuous), oxygen, telemetry, NG tube (suprapubic catheter, B mittens, wedge on pt's R side)  upon PT entry to room.    General Precautions: Standard, fall, contact  Orthopedic Precautions:N/A   Braces: N/A  Respiratory Status:  bipap fiO2 85%, rate 22; spO2: 94% at rest, lowest with activity is 87%  Blood Pressure: 106/99 sitting EOB      Exams:  Cognitive Exam:  unable to formally assess; pt appeared to have difficulty understanding commands to nod/shake head in order to communicate 2/2 difficulty speaking with bipap donned  BLE ROM: appeared WNL for AROM, limited mobility 2/2 limited command following   BLE Strength: unable to formally assess MMTs  Skin integrity: Visible skin intact      Functional Mobility:  Bed Mobility:     Supine to Sit: total assistance  Sit to Supine: total assistance  A long sit was initially performed- pt appeared to tolerate well while maintaining O2 sats aroumnd 92% therefore pt transitioned to sitting EOB    Balance: pt requires CGA-Ashli for static sitting balance; kyphotic posture; no overt trunk LOB; eyes closed majority of time sitting EOB- constant stimulation required to remain awake; kicked LLE to command; vitals stable while sitting       Treatment & Education:    Patient provided with verbal education regarding PT role/goals/POC, fall prevention, safety awareness, and discharge/DME recommendations.  Additional teaching is warranted.     Patient left HOB elevated with all lines intact, call button in reach, and wedge on pt's R side, B mittens donned, B SCD donned, B pressure relief boots donned, pillows btw knees, pillow under L UE .    GOALS:   Multidisciplinary Problems       Physical Therapy Goals          Problem: Physical Therapy    Goal Priority Disciplines Outcome Goal Variances Interventions   Physical Therapy Goal     PT, PT/OT Ongoing, Progressing      Description: Goals to be met by: 24     Patient will increase functional independence with mobility by performin. Supine to sit with MInimal Assistance  2. Sit to supine with MInimal Assistance  3. Sit to stand transfer with Minimal Assistance  4. Gait  TBD  5. Sitting at edge of bed x15 minutes with Stand-by Assistance while maintaining adequate O2 saturations                          History:     Past Medical History:   Diagnosis Date    CKD (chronic kidney disease)     COPD (chronic obstructive pulmonary disease)     HLD (hyperlipidemia)     HTN (hypertension)     Merkel cell carcinoma     Paroxysmal A-fib     Prostate cancer     PUD (peptic ulcer disease)        Past Surgical History:   Procedure Laterality Date    INGUINAL HERNIA REPAIR      PROSTATE SURGERY      ROTATOR CUFF REPAIR         Time Tracking:     PT Received On: 24  PT Start Time: 1423     PT Stop Time: 1451  PT Total Time (min): 28 min     Billable Minutes: Evaluation        2024

## 2024-01-09 NOTE — PROGRESS NOTES
[facility]    Cardiology  Progress Note    Patient Name: Francis Montero  MRN: [id number]  Admission Date: 1/2/2024  Hospital Length of Stay: 7 days  Code Status: Partial Code   Attending Physician: Gurdeep Shah MD   Primary Care Physician: Lisa, Primary Doctor  Expected Discharge Date:   Principal Problem:SOB (shortness of breath)    Subjective:   Consultation Reason: Abnormal Troponin & AF RVR     HPI: Mr. Montero is a 89 y/o male,  unknown to [facility] (Followed by Dr. Major in [place]), who presented to [facility] with c/o SOB. Admit VS- BP 70/30, P RA sat 85%. He was given 200ml NS enroute. In Ed he was given another 1.5L NS bolus with improvement in BP. Lab significant for WBC 13.89, K+ 5.7, CO2 16, BUN/creatinine 83.4/3.10, Mg 1.50, BNP 1970, troponin 0.236, Venous lactate 3.3. EKG SR with PACs. CXR consistent with pulmonary congestion and a large bilateral pleural effusions. Echo revealing EF 60-65% with mild to  moderate AS, mild MS and mild to moderate TR. Blood and urine cultures obtained and patient started on antibiotics. [facility] has been consulted for elevated troponin    Hospital Course:  1.3.24: NAD Noted. On BIPAP. He is awake but confused, requiring Mittens. AF RVR. On Amiodarone Infusion, He has a history of AF. Hypotensive Requiring Levophed Support. Legs are Warm.    1.4.24: BIPAP Support. Awake. AF RVR. Requiring Levophed Support. On Amiodarone Infusion. On Heparin Infusion for Stroke Risk Reduction Related to AF.   1.5.24: BIPAP. AF RVR. Remains on Levophed. Septic status noted. Family at bedside. Thrombocytopenia noted, Will discontinue Heparin. Also will discontinue Amiodarone Infusion.   1.6.24: NAD. Marginal BP. CPAP. AFIB CVR. On Pressors. Thrombocytopenia worse.   1.7.24: NAD. BP improved - currently off pressures. On CPAP. AFIB currently CVR. Thrombocytopenia continues to worse. Hypothermia per documentation has improved   1.8.24: NAD. BiPAP. NSR with PACS/PAF with CVR. PLT 97.   1.9.24:  NAD. BiPAP. NSR with PACS, No PAF Currently. PLT Improved 138. WBC 14.74.     PMH: Afib/Eliquis, COPD, asbestosis, Merkel cell carcinoma of left ear anxiety, esophagitis, esophageal varices, hiatal hernia, merkel cell carcinoma, prostate cancer, chronic UTIs, HLD, PUD  PSH: surgical resection with adjuvant radiotherapy to Left ear,  EGD, prostate surgery,  open shoulder rotator cuff repair  Family History: Reviewed and Unremarkable for Heart Disease  Social History: Former Smoker 40 Pack Years     Previous Cardiac Diagnostics:   Echocardiogram (1.2.24):  Left Ventricle: The left ventricle is normal in size. Normal wall thickness. Normal wall motion. There is normal systolic function with a visually estimated ejection fraction of 60 - 65%. Diastolic function cannot be reliably determined in the presence of mitral valve disease. Inconclusive left ventricular filling pressure.  Right Ventricle: Normal right ventricular cavity size. Systolic function is normal.  Aortic Valve: There is mild to moderate stenosis. Aortic valve area by VTI is 1.23 cm². Aortic valve peak velocity is 2.63 m/s. Mean gradient is 18 mmHg. The dimensionless index is 0.39. There is trace aortic regurgitation.  Mitral Valve: There is severe mitral annular calcification present. There is mild stenosis. The mean pressure gradient across the mitral valve is 6 mmHg at a heart rate of  bpm.  Tricuspid Valve: There is mild to moderate regurgitation. The estimated PA systolic pressure is at least 38 mmHg.     Echocardiogram (2.22.22):  Left Ventricle: Left ventricle is normal in size and function. Normal   wall thickness.   Right Ventricle: Right ventricle is normal in size and function. Normal   wall thickness.   Mitral Valve: Mitral valve is normal in structure and function. Mildly   calcified leaflets.   Tricuspid Valve: Tricuspid valve is normal size and function.   Aortic Valve: Aortic valve is normal in structure and function.   Moderately calcified  cusps.      Review of patient's allergies indicates:  No Known Allergies    Review of Systems   Unable to perform ROS: Acuity of condition     Objective:     Vital Signs (Most Recent):  Temp: 98.3 °F (36.8 °C) (01/09/24 1200)  Pulse: (!) 59 (01/09/24 1235)  Resp: 14 (01/09/24 1235)  BP: (!) 109/58 (01/09/24 1201)  SpO2: (!) 94 % (01/09/24 1235) Vital Signs (24h Range):  Temp:  [97 °F (36.1 °C)-98.4 °F (36.9 °C)] 98.3 °F (36.8 °C)  Pulse:  [51-79] 59  Resp:  [13-28] 14  SpO2:  [76 %-100 %] 94 %  BP: ()/(49-72) 109/58     Weight: 72.5 kg (159 lb 13.3 oz)  Body mass index is 26.6 kg/m².    SpO2: (!) 94 %         Intake/Output Summary (Last 24 hours) at 1/9/2024 1310  Last data filed at 1/9/2024 0547  Gross per 24 hour   Intake 707.18 ml   Output 760 ml   Net -52.82 ml       Lines/Drains/Airways       Peripherally Inserted Central Catheter Line  Duration             PICC Triple Lumen 01/04/24 1725 left basilic 4 days              Drain  Duration                  NG/OG Tube 01/05/24 1500 Tift sump 16 Fr. Left nostril 3 days         Suprapubic Catheter 01/05/24 1720 24 Fr. 3 days              Peripheral Intravenous Line  Duration                  Peripheral IV - Single Lumen 01/04/24 1459 20 G Anterior;Left;Proximal Forearm 4 days                  Significant Labs:   Recent Results (from the past 72 hour(s))   APTT    Collection Time: 01/07/24  2:59 AM   Result Value Ref Range    PTT 35.4 (H) 23.2 - 33.7 seconds   CBC with Differential    Collection Time: 01/07/24  2:59 AM   Result Value Ref Range    WBC 8.57 4.50 - 11.50 x10(3)/mcL    RBC 4.49 (L) 4.70 - 6.10 x10(6)/mcL    Hgb 11.8 (L) 14.0 - 18.0 g/dL    Hct 38.8 (L) 42.0 - 52.0 %    MCV 86.4 80.0 - 94.0 fL    MCH 26.3 (L) 27.0 - 31.0 pg    MCHC 30.4 (L) 33.0 - 36.0 g/dL    RDW 16.3 11.5 - 17.0 %    Platelet 89 (L) 130 - 400 x10(3)/mcL    MPV 11.3 (H) 7.4 - 10.4 fL    Neut % 88.2 %    Lymph % 7.7 %    Mono % 2.8 %    Eos % 0.2 %    Basophil % 0.2 %    Lymph #  0.66 0.6 - 4.6 x10(3)/mcL    Neut # 7.55 2.1 - 9.2 x10(3)/mcL    Mono # 0.24 0.1 - 1.3 x10(3)/mcL    Eos # 0.02 0 - 0.9 x10(3)/mcL    Baso # 0.02 <=0.2 x10(3)/mcL    IG# 0.08 (H) 0 - 0.04 x10(3)/mcL    IG% 0.9 %    NRBC% 0.0 %   Comprehensive Metabolic Panel    Collection Time: 01/07/24  2:59 AM   Result Value Ref Range    Sodium Level 146 132 - 146 mmol/L    Potassium Level 3.6 3.5 - 5.1 mmol/L    Chloride 113 (H) 98 - 111 mmol/L    Carbon Dioxide 20 (L) 23 - 31 mmol/L    Glucose Level 100 75 - 121 mg/dL    Blood Urea Nitrogen 95.0 (H) 8.4 - 25.7 mg/dL    Creatinine 2.81 (H) 0.73 - 1.18 mg/dL    Calcium Level Total 9.0 8.8 - 10.0 mg/dL    Protein Total 5.0 (L) 5.8 - 7.6 gm/dL    Albumin Level 1.8 (L) 3.4 - 4.8 g/dL    Globulin 3.2 2.4 - 3.5 gm/dL    Albumin/Globulin Ratio 0.6 (L) 1.1 - 2.0 ratio    Bilirubin Total 0.5 <=1.5 mg/dL    Alkaline Phosphatase 80 40 - 150 unit/L    Alanine Aminotransferase 11 0 - 55 unit/L    Aspartate Aminotransferase 30 5 - 34 unit/L    eGFR 20 mls/min/1.73/m2   RT Blood Gas    Collection Time: 01/07/24 11:30 PM   Result Value Ref Range    Sample Type Arterial Blood     Sample site Right Radial Artery     Drawn by Pantera Esparza     pH, Blood gas 7.340 (L) 7.350 - 7.450    pCO2, Blood gas 43.0 35.0 - 45.0 mmHg    pO2, Blood gas 64.0 (L) 80.0 - 100.0 mmHg    Sodium, Blood Gas 140 137 - 145 mmol/L    Potassium, Blood Gas 3.4 (L) 3.5 - 5.0 mmol/L    Calcium Level Ionized 1.34 (H) 1.12 - 1.23 mmol/L    TOC2, Blood gas 24.5 mmol/L    Base Excess, Blood gas -2.50 (L) -2.00 - 2.00 mmol/L    sO2, Blood gas 90.6 %    HCO3, Blood gas 23.2 22.0 - 26.0 mmol/L    THb, Blood gas 11.2 (L) 12 - 16 g/dL    O2 Hb, Blood Gas 91.6 (L) 94.0 - 97.0 %    CO Hgb 0.0 (L) 0.5 - 1.5 %    Met Hgb 0.2 (L) 0.4 - 1.5 %    Allens Test Yes     MODE CPAP     FIO2, Blood gas 60 %    CPAP 10 cm H2O   APTT    Collection Time: 01/08/24  2:37 AM   Result Value Ref Range    PTT 35.1 (H) 23.2 - 33.7 seconds   CBC with  Differential    Collection Time: 01/08/24  2:37 AM   Result Value Ref Range    WBC 11.44 4.50 - 11.50 x10(3)/mcL    RBC 4.47 (L) 4.70 - 6.10 x10(6)/mcL    Hgb 11.9 (L) 14.0 - 18.0 g/dL    Hct 37.5 (L) 42.0 - 52.0 %    MCV 83.9 80.0 - 94.0 fL    MCH 26.6 (L) 27.0 - 31.0 pg    MCHC 31.7 (L) 33.0 - 36.0 g/dL    RDW 16.3 11.5 - 17.0 %    Platelet 97 (L) 130 - 400 x10(3)/mcL    MPV 12.0 (H) 7.4 - 10.4 fL    Neut % 90.0 %    Lymph % 5.2 %    Mono % 3.1 %    Eos % 0.3 %    Basophil % 0.2 %    Lymph # 0.59 (L) 0.6 - 4.6 x10(3)/mcL    Neut # 10.31 (H) 2.1 - 9.2 x10(3)/mcL    Mono # 0.35 0.1 - 1.3 x10(3)/mcL    Eos # 0.03 0 - 0.9 x10(3)/mcL    Baso # 0.02 <=0.2 x10(3)/mcL    IG# 0.14 (H) 0 - 0.04 x10(3)/mcL    IG% 1.2 %    NRBC% 0.0 %   Comprehensive Metabolic Panel    Collection Time: 01/08/24  5:42 AM   Result Value Ref Range    Sodium Level 146 132 - 146 mmol/L    Potassium Level 3.8 3.5 - 5.1 mmol/L    Chloride 115 (H) 98 - 111 mmol/L    Carbon Dioxide 21 (L) 23 - 31 mmol/L    Glucose Level 103 75 - 121 mg/dL    Blood Urea Nitrogen 98.0 (H) 8.4 - 25.7 mg/dL    Creatinine 2.52 (H) 0.73 - 1.18 mg/dL    Calcium Level Total 9.1 8.8 - 10.0 mg/dL    Protein Total 5.1 (L) 5.8 - 7.6 gm/dL    Albumin Level 1.9 (L) 3.4 - 4.8 g/dL    Globulin 3.2 2.4 - 3.5 gm/dL    Albumin/Globulin Ratio 0.6 (L) 1.1 - 2.0 ratio    Bilirubin Total 0.6 <=1.5 mg/dL    Alkaline Phosphatase 103 40 - 150 unit/L    Alanine Aminotransferase 13 0 - 55 unit/L    Aspartate Aminotransferase 31 5 - 34 unit/L    eGFR 23 mls/min/1.73/m2   APTT    Collection Time: 01/09/24  4:01 AM   Result Value Ref Range    PTT 32.7 23.2 - 33.7 seconds   Comprehensive Metabolic Panel    Collection Time: 01/09/24  4:01 AM   Result Value Ref Range    Sodium Level 152 (H) 132 - 146 mmol/L    Potassium Level 3.8 3.5 - 5.1 mmol/L    Chloride 120 (H) 98 - 111 mmol/L    Carbon Dioxide 23 23 - 31 mmol/L    Glucose Level 122 (H) 75 - 121 mg/dL    Blood Urea Nitrogen 94.5 (H) 8.4 - 25.7  mg/dL    Creatinine 2.21 (H) 0.73 - 1.18 mg/dL    Calcium Level Total 9.1 8.8 - 10.0 mg/dL    Protein Total 4.8 (L) 5.8 - 7.6 gm/dL    Albumin Level 1.7 (L) 3.4 - 4.8 g/dL    Globulin 3.1 2.4 - 3.5 gm/dL    Albumin/Globulin Ratio 0.5 (L) 1.1 - 2.0 ratio    Bilirubin Total 0.4 <=1.5 mg/dL    Alkaline Phosphatase 107 40 - 150 unit/L    Alanine Aminotransferase 12 0 - 55 unit/L    Aspartate Aminotransferase 29 5 - 34 unit/L    eGFR 27 mls/min/1.73/m2   Magnesium    Collection Time: 01/09/24  4:01 AM   Result Value Ref Range    Magnesium Level 2.30 1.60 - 2.60 mg/dL   CBC with Differential    Collection Time: 01/09/24  4:01 AM   Result Value Ref Range    WBC 14.74 (H) 4.50 - 11.50 x10(3)/mcL    RBC 4.29 (L) 4.70 - 6.10 x10(6)/mcL    Hgb 11.4 (L) 14.0 - 18.0 g/dL    Hct 36.4 (L) 42.0 - 52.0 %    MCV 84.8 80.0 - 94.0 fL    MCH 26.6 (L) 27.0 - 31.0 pg    MCHC 31.3 (L) 33.0 - 36.0 g/dL    RDW 16.5 11.5 - 17.0 %    Platelet 138 130 - 400 x10(3)/mcL    MPV 12.0 (H) 7.4 - 10.4 fL    Neut % 91.4 %    Lymph % 4.5 %    Mono % 2.4 %    Eos % 0.2 %    Basophil % 0.1 %    Lymph # 0.67 0.6 - 4.6 x10(3)/mcL    Neut # 13.47 (H) 2.1 - 9.2 x10(3)/mcL    Mono # 0.35 0.1 - 1.3 x10(3)/mcL    Eos # 0.03 0 - 0.9 x10(3)/mcL    Baso # 0.02 <=0.2 x10(3)/mcL    IG# 0.20 (H) 0 - 0.04 x10(3)/mcL    IG% 1.4 %    NRBC% 0.1 %    IPF 6.5 0.9 - 11.2 %     Telemetry: SR/PACs    Physical Exam  Vitals reviewed.   Constitutional:       General: He is not in acute distress.     Appearance: He is ill-appearing.      Comments: BiPAP   HENT:      Head: Normocephalic.      Mouth/Throat:      Mouth: Mucous membranes are dry.   Eyes:      Conjunctiva/sclera: Conjunctivae normal.   Cardiovascular:      Rate and Rhythm: Normal rate. Rhythm irregular.   Pulmonary:      Effort: Pulmonary effort is normal. No respiratory distress.      Breath sounds: Rhonchi present.      Comments: BiPAP  Abdominal:      Palpations: Abdomen is soft.   Musculoskeletal:      Right lower  leg: No edema.      Left lower leg: No edema.      Comments: Legs are Warm   Skin:     General: Skin is warm.   Neurological:      Mental Status: He is alert. He is confused.      Comments: Awake/Alert with Intermittent AMS   Psychiatric:      Comments: Toya         Current Inpatient Medications:  Current Facility-Administered Medications:     0.9%  NaCl infusion, , Intravenous, Continuous, Bernardino Ordoñez MD, Last Rate: 10 mL/hr at 01/09/24 0547, Rate Verify at 01/09/24 0547    acetaminophen tablet 1,000 mg, 1,000 mg, Oral, Q6H PRN, Grisel Valentine FNP, 1,000 mg at 01/09/24 0117    acetaminophen tablet 650 mg, 650 mg, Oral, Q4H PRN, Grisel Valentine FNP, 650 mg at 01/07/24 1447    albuterol nebulizer solution, 10 mg/hr, Nebulization, Continuous, Mendoza Mckeon MD, Last Rate: 12 mL/hr at 01/02/24 1256, 10 mg/hr at 01/02/24 1256    albuterol-ipratropium 2.5 mg-0.5 mg/3 mL nebulizer solution 3 mL, 3 mL, Nebulization, Q4H PRN, Grisel Valentine FNP, 3 mL at 01/03/24 0229    [COMPLETED] amiodarone tablet 400 mg, 400 mg, Oral, BID, 400 mg at 01/08/24 0928 **FOLLOWED BY** amiodarone tablet 200 mg, 200 mg, Oral, BID, 200 mg at 01/09/24 0941 **FOLLOWED BY** [START ON 1/11/2024] amiodarone tablet 200 mg, 200 mg, Oral, Daily, Charles Taveras FNP    atorvastatin tablet 10 mg, 10 mg, Oral, Daily, Grisel Valentine FNP, 10 mg at 01/09/24 0942    calcitRIOL capsule 0.25 mcg, 0.25 mcg, Per NG tube, Daily, Gurdeep Shah MD, 0.25 mcg at 01/09/24 0952    cefTRIAXone (ROCEPHIN) 2 g in dextrose 5 % in water (D5W) 100 mL IVPB (MB+), 2 g, Intravenous, Q24H, Juan Sanchez MD, Stopped at 01/09/24 1257    famotidine (PF) injection 20 mg, 20 mg, Intravenous, Daily, Gurdeep Shah MD, 20 mg at 01/09/24 0942    gabapentin capsule 300 mg, 300 mg, Oral, QHS, Grisel Valentine FNP, 300 mg at 01/08/24 2109    melatonin tablet 6 mg, 6 mg, Oral, Nightly PRN, Grisel Valentine FNP, 6 mg at 01/07/24  2024    metoprolol injection 5 mg, 5 mg, Intravenous, Q5 Min PRN, Wendy Gabriel FNP    morphine injection 1 mg, 1 mg, Intravenous, Q8H PRN, Thomas Palacios MD, 1 mg at 01/09/24 0123    naloxone 0.4 mg/mL injection 0.02 mg, 0.02 mg, Intravenous, PRN, Grisel Valentine, DELP    NORepinephrine 8 mg in dextrose 5% 250 mL infusion, 0-3 mcg/kg/min, Intravenous, Continuous, Fadumo Ramirez DO, Stopped at 01/06/24 0434    ondansetron injection 4 mg, 4 mg, Intravenous, Q4H PRN, Grisel Valentine, DELP    oxybutynin tablet 5 mg, 5 mg, Oral, TID, Arianna Ornelas AGACNP-BC, 5 mg at 01/09/24 0941    prochlorperazine injection Soln 5 mg, 5 mg, Intravenous, Q6H PRN, Grisel Valentine, DELP    simethicone chewable tablet 80 mg, 1 tablet, Oral, QID PRN, Grisel Valentine, FNP    sodium chloride 0.9% flush 10 mL, 10 mL, Intravenous, PRN, Grisel Valentine, FNP    sodium chloride 0.9% flush 10 mL, 10 mL, Intravenous, PRN, Manny Sawant,     Flushing PICC/Midline Protocol, , , Until Discontinued **AND** sodium chloride 0.9% flush 10 mL, 10 mL, Intravenous, Q6H, 10 mL at 01/09/24 1200 **AND** sodium chloride 0.9% flush 10 mL, 10 mL, Intravenous, PRN, Bernardino Ordoñez MD    VTE Risk Mitigation (From admission, onward)           Ordered     Reason for No Pharmacological VTE Prophylaxis  Once        Question:  Reasons:  Answer:  Already adequately anticoagulated on oral Anticoagulants    01/02/24 1329     IP VTE HIGH RISK PATIENT  Once         01/02/24 1329     Place sequential compression device  Until discontinued         01/02/24 1329                  Assessment:   Acute on Chronic Diastolic Heart Failure/EF 60-65% - Compensated    - EF 60-65%  Atrial Fibrillation (Unspecified) - Now SR/PACs    - CHADsVASc - 4 Points - 4.8% Stroke Risk per Year     - On Eliquis Outpatient - Heparin Infusion Discontinued 2/2 Thrombocytopenia   Hypotension requiring Pressors - Resolved   Thrombocytopenia - Resolved    CAP  Bilateral Pleural Effusion (Right > Left)- ? Right Empyema     - ID Following   Acute Respiratory Failure requiring CPAP /BiPAP  NSTEMI- Type II in the Setting of Heart Failure & Septic Shock  Septic Shock Requiring Vasopressor Support    - Strept Bacteremia/Urine Culture Positive for Klebsiella Pneumoniae   Valvular Heart Disease    - AS: Mild to Moderate, TR: Mild to Moderate, MS: Mild with Severe MAC  Acute Kidney Failure/CKD  Lactic Acidosis (Resolved)  Confusion    - CT Head Normal  UTI    - Chronic Ureteral Stent  Hyperlipidemia    - On Statin   History of Esophageal Varices  COPD  History of Merkel Cell Carcinoma on Ear  History of Prostate Cancer  COVID-19 & Influenza Negative on 1.2.24  No known History of GI Bleed    Plan:   Continue Amiodarone Taper and Statin   Antibiotic Management as per ID Team  PRN Lopressor 5 mg for Sustained HR > 120  Hold off on Oral BB 2/2 Bradycardia  Resume OAC when Ok with Primary Team (Home Dose)  Supportive Care as per ICU Team.   Accurate I&Os and Daily Weights  F/U with Dr. Major/[place] Cardiologist in 1-2 Weeks upon D/C  We will be available as needed    AGNIESZKA Calixto  Cardiology  [facility]  01/09/2024     I agree with the findings of the complexity of problems addressed and take responsibility for the plan's risks and complications. I approved the plan documented by Riley Bazzi NP.  Continue amiodarone taper.  Monitor liver function tests as the patient is on amiodarone and statin.  Resume home Eliquis when okay with  primary admitting team

## 2024-01-09 NOTE — CONSULTS
Inpatient consult to Palliative Care  Consult performed by: Julieta Chahal FNP  Consult ordered by: Gurdeep Shah MD      Patient Name: Francis Montero   MRN: [id number]   Admission Date: 1/2/2024   Hospital Length of Stay: 7   Attending Provider: Gurdeep Shah MD   Consulting Provider: Julieta SCHMITZ  Reason for Consult: Goals of Care  Primary Care Physician: No, Primary Doctor     Principal Problem: SOB (shortness of breath)     Patient information was obtained from relative(s) and ER records.      Final diagnoses:  [R06.02] SOB (shortness of breath)  [I50.9] Acute exacerbation of CHF (congestive heart failure)  [J90] Pleural effusion     Assessment/Plan:     I reviewed the patient and family's understanding of the seriousness of the illness and its expected prognosis. We discussed the patient's goals of care and treatment preferences.  I clarified current code status. I identified the surrogate decision maker or health care POA.  I answered all questions and we formulated a plan including recommendations for symptom management and how to best achieve goals of care.       Advance Care Planning     Date: 01/09/2024    [facility]  I engaged the family in a voluntary conversation about advance care planning and we specifically addressed what the goals of care would be moving forward, in light of the patient's change in clinical status, specifically current condition.  We did specifically address the patient's likely prognosis, which is poor.  We explored the patient's values and preferences for future care.  The family endorses that what is most important right now is to focus on improvement in condition but with limits to invasive therapies    Accordingly, we have decided that  the best plan to meet the patient's goals includes continuing with treatment    I did explain the role for hospice care at this stage of the patient's illness, including its ability to help the patient live with the best quality  of life possible.  We will not be making a hospice referral.    Discussed case with RN.  Called son Don--introduced service and discussed patient's history and current condition.  Son states that patient was living independently with his wife and only stopped cutting his grass 2 years ago.  Reports that his father and mother who live in [place] were visiting her twin sister who lives in [place].     Discussed AD to which son states that patient has completed power-of- noting his wife as agent.  Discussed that patient's wife is legal next of kin.    Son did inform that patient has 3 children from prior marriage from whom he is estranged and do not know that patient is in current condition.    Discussed ongoing conversations with medical team to which son states that he was somewhat hopeful after speaking with intensivist this morning.  Reports that his blood pressure has improved but that his breathing is the main problem.  Discussed the ICU team will continue to attempt to wean BiPAP support discussed that his important for family to plan for improvement and decline of patient. Discussed current code status to which son states that he spoke to physician earlier and that family does not want intubation or CPR but would be okay with Medical resuscitation measures.       Son was asking where to go from here.   Discussed that if patient makes improvement medical team will continue to wean BiPAP support.  Discussed that if patient does not make improvement or makes decline family may want to consider comfort measures.  Son states that he is a  and that he is prayed with patient and feels at peace with whatever happens.  Son states that if patient is dying he would like him to be closer to home but realizes that may not be realistic.    Son asking about options if BiPAP were removed.  Discussed that bypass support could be weaned at hospital and that if patient were eminently passing he would remain in  the hospitalist care.  Discuss transition to inpatient hospice house but informed son that we would need to evaluate whether hospice house could provide current BiPAP support.  Discussed with son that patient would not likely be stable for transfer back home in current condition.  He verbalized understanding.  Son states that his mother will be at hospital tomorrow and would like to have further conversation.  Informed that palliative team will meet with her at that time.  Offered support and informed that I will continue to follow.          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  He was originally started on IV antibiotics and became progressively hypotensive that was not responsive to fluid boluses, after which Levophed was initiated.  Intensivist spoke with family who were in agreement with BiPAP and code management per medications only but not in agreement with intubation or CPR.  Therefore  partial code status was ordered.  Patient has required bipap with current setting of 70% FIO2.  Palliative care consulted for discussion of goals of care.       Active Ambulatory Problems     Diagnosis Date Noted    No Active Ambulatory Problems     Resolved Ambulatory Problems     Diagnosis Date Noted    No Resolved Ambulatory Problems     Past Medical History:   Diagnosis Date    CKD (chronic kidney disease)     COPD (chronic obstructive pulmonary disease)     HLD (hyperlipidemia)     HTN (hypertension)     Merkel cell carcinoma     Paroxysmal A-fib     Prostate cancer     PUD (peptic ulcer disease)         Past Surgical History:   Procedure Laterality Date    INGUINAL HERNIA REPAIR      PROSTATE SURGERY       ROTATOR CUFF REPAIR          Review of patient's allergies indicates:  No Known Allergies       Current Facility-Administered Medications:     0.9%  NaCl infusion, , Intravenous, Continuous, Bernardino Ordoñez MD, Last Rate: 10 mL/hr at 01/09/24 0547, Rate Verify at 01/09/24 0547    acetaminophen tablet 1,000 mg, 1,000 mg, Oral, Q6H PRN, Grisel Valentine, FNP, 1,000 mg at 01/09/24 0117    acetaminophen tablet 650 mg, 650 mg, Oral, Q4H PRN, Grisel Valentine, FNP, 650 mg at 01/07/24 1447    albuterol nebulizer solution, 10 mg/hr, Nebulization, Continuous, Mendoza Mckeon MD, Last Rate: 12 mL/hr at 01/02/24 1256, 10 mg/hr at 01/02/24 1256    albuterol-ipratropium 2.5 mg-0.5 mg/3 mL nebulizer solution 3 mL, 3 mL, Nebulization, Q4H PRN, Grisel Valentine, FNP, 3 mL at 01/03/24 0229    [COMPLETED] amiodarone tablet 400 mg, 400 mg, Oral, BID, 400 mg at 01/08/24 0928 **FOLLOWED BY** amiodarone tablet 200 mg, 200 mg, Oral, BID, 200 mg at 01/09/24 0941 **FOLLOWED BY** [START ON 1/11/2024] amiodarone tablet 200 mg, 200 mg, Oral, Daily, Charles Taveras, FNP    atorvastatin tablet 10 mg, 10 mg, Oral, Daily, Grisel Valentine, FNP, 10 mg at 01/09/24 0942    calcitRIOL capsule 0.25 mcg, 0.25 mcg, Per NG tube, Daily, Gurdeep Shah MD, 0.25 mcg at 01/09/24 0952    cefTRIAXone (ROCEPHIN) 2 g in dextrose 5 % in water (D5W) 100 mL IVPB (MB+), 2 g, Intravenous, Q24H, Juan Sanchez MD, Stopped at 01/09/24 1257    famotidine (PF) injection 20 mg, 20 mg, Intravenous, Daily, Gurdeep Shah MD, 20 mg at 01/09/24 0942    gabapentin capsule 300 mg, 300 mg, Oral, QHS, Grisel Valentine FNP, 300 mg at 01/08/24 2109    melatonin tablet 6 mg, 6 mg, Oral, Nightly PRN, Grisel Valentine FNP, 6 mg at 01/07/24 2024    metoprolol injection 5 mg, 5 mg, Intravenous, Q5 Min PRN, Wendy Gabriel FNP    morphine injection 1 mg, 1 mg, Intravenous, Q8H PRN, Thomas Palacios MD, 1 mg at 01/09/24 0123    naloxone 0.4 mg/mL  "injection 0.02 mg, 0.02 mg, Intravenous, PRN, Grisel Valentine, FNP    NORepinephrine 8 mg in dextrose 5% 250 mL infusion, 0-3 mcg/kg/min, Intravenous, Continuous, Fadumo Ramirez DO, Stopped at 01/06/24 0434    ondansetron injection 4 mg, 4 mg, Intravenous, Q4H PRN, Grisel Valentine, FNP    oxybutynin tablet 5 mg, 5 mg, Oral, TID, Arianna Ornelas, AGACNP-BC, 5 mg at 01/09/24 0941    prochlorperazine injection Soln 5 mg, 5 mg, Intravenous, Q6H PRN, Grisel Valentine, DELP    simethicone chewable tablet 80 mg, 1 tablet, Oral, QID PRN, Grisel Valentine, FNP    sodium chloride 0.9% flush 10 mL, 10 mL, Intravenous, PRN, Grisel Valentine, FNP    sodium chloride 0.9% flush 10 mL, 10 mL, Intravenous, PRN, Manny Sawant DO    Flushing PICC/Midline Protocol, , , Until Discontinued **AND** sodium chloride 0.9% flush 10 mL, 10 mL, Intravenous, Q6H, 10 mL at 01/09/24 1200 **AND** sodium chloride 0.9% flush 10 mL, 10 mL, Intravenous, PRN, Bernardino Ordoñez MD     acetaminophen, acetaminophen, albuterol-ipratropium, melatonin, metoprolol, morphine, naloxone, ondansetron, prochlorperazine, simethicone, sodium chloride 0.9%, sodium chloride 0.9%, Flushing PICC/Midline Protocol **AND** sodium chloride 0.9% **AND** sodium chloride 0.9%     No family history on file.     Review of Systems   Unable to perform ROS: Acuity of condition            Objective:   /64   Pulse (!) 57   Temp 98.3 °F (36.8 °C)   Resp 13   Ht 5' 5" (1.651 m)   Wt 72.5 kg (159 lb 13.3 oz)   SpO2 95%   BMI 26.60 kg/m²      Physical Exam  Constitutional:       Appearance: He is ill-appearing.   Eyes:      Pupils: Pupils are equal, round, and reactive to light.   Cardiovascular:      Rate and Rhythm: Rhythm irregular.   Pulmonary:      Breath sounds: Rhonchi present.   Abdominal:      Palpations: Abdomen is soft.   Musculoskeletal:      Comments: sarcopenia   Neurological:      Comments: To person         "   FAMILY CONTACTS:     Review of Symptoms  Review of Symptoms      Symptom Assessment (ESAS 0-10 Scale)  Pain:  0  Dyspnea:  0  Anxiety:  0  Nausea:  0  Depression:  0  Anorexia:  0  Fatigue:  0  Insomnia:  0  Restlessness:  0  Agitation:  0         Bowel Management Plan (BMP):  Yes      Performance Status:  20    Living Arrangements:  Lives with family    Psychosocial/Cultural:   See Palliative Psychosocial Note: Yes  Patient is  with one son from current wife and 3 children from prior relationship.  He is a retired .    **Primary  to Follow**  Palliative Care  Consult: No    Spiritual:  F - Carmen and Belief:  Non-Bahai      Advance Care Planning   Advance Directives:   Do Not Resuscitate: partial code.      Decision Making:  Family answered questions  Goals of Care: The family endorses that what is most important right now is to focus on improvement in condition but with limits to invasive therapies    Accordingly, we have decided that the best plan to meet the patient's goals includes continuing with treatment          PAINAD: NA    Caregiver burden formerly assessed: Yes        > 50% of 70 min of encounter was spent in chart review, face to face discussion of goals of care, symptom assessment, coordination of care and emotional support.         Julieta Chahal FNP, [facility]  Palliative Medicine  [facility] - Observation Unit

## 2024-01-09 NOTE — PT/OT/SLP EVAL
"Occupational Therapy  Evaluation    Name: Francis Montero  MRN: [id number]  Admitting Diagnosis: AMS  Recent Surgery: * No surgery found *      Recommendations:     Discharge therapy intensity: Moderate Intensity Therapy   Discharge Equipment Recommendations:   (TBD)  Barriers to discharge:  Other (Comment) (ongoing medical needs)    Assessment:     Francis Montero is a 89 y.o. male with a medical diagnosis of sepsis, acute cystitis, hypotension, pneumonia, respiratory failure, and R pleural effusion. He presents lethargic but agreeable to therapy. He tolerated OT evaluation fairly. He presents with the following performance deficits affecting function: weakness, impaired cardiopulmonary response to activity, impaired endurance, impaired balance, decreased lower extremity function, decreased upper extremity function, impaired self care skills, impaired functional mobility, decreased safety awareness, impaired cognition. He required total A for bed mobility and tolerated sitting EOB for ~8 minutes; Sp02 stable 88%-93%; limited by lethargy and fatigue.     Rehab Prognosis: Fair; patient would benefit from acute skilled OT services to address these deficits and reach maximum level of function.       Plan:     Patient to be seen 3 x/week to address the above listed problems via self-care/home management, therapeutic activities, therapeutic exercises  Plan of Care Expires: 02/06/24  Plan of Care Reviewed with: patient    Subjective     Chief Complaint: Pt pointed to L hip and nodded head "yes" when asked if in pain.   Patient/Family Comments/goals: To get stronger.     Occupational Profile:  Living Environment: Unknown at this time. Pt unable to provide due to difficult speaking as a result of bipap.     Pain/Comfort:  Pain Rating 1: other (see comments) (verbalized pain and pointed to L hip)  Pain Addressed 1: Reposition, Nurse notified    Patients cultural, spiritual, Buddhist conflicts given the current situation: " no    Objective:     OT communicated with RN prior to session.      Patient was found HOB elevated with blood pressure cuff, telemetry, pulse ox (continuous), oxygen, NG tube (suprapubic catheter) upon OT entry to room.    General Precautions: Standard, contact, fall  Orthopedic Precautions: N/A  Braces: N/A    Vital Signs: Blood Pressure: 106/69  HR: 68  Sp02: 94%  Supplemental 02: bipap fiO2 85%, rate 22  With Activity: Sp02 88%-93%    Bed Mobility:    Patient completed Supine to Sit with total assistance  Patient completed Sit to Supine with total assistance    Activities of Daily Living:  Will continue to assess as pt able.     AMPAC 6 Click ADL:  AMPAC Total Score: 14    Functional Cognition:  Affect: Lethargic  Safety Awareness: Impaired. Pt attempting to pull bipap off when mittens doffed.     Visual Perceptual Skills:  Will continue to assess as pt progresses.     Upper Extremity Function:  Right Upper Extremity:   Range of Motion: WFL  Strength: weakness noted, 3/5    Left Upper Extremity:  Range of Motion: WFL  Strength: weakness noted, 3/5    Balance:   Static sitting balance: min-CGA    Therapeutic Positioning  Risk for acquired pressure injuries is increased due to impaired mobility.    OT interventions performed during the course of today's session:   Therapeutic positioning was provided at the conclusion of session to offload all bony prominences for the prevention and/or reduction of pressure injuries and for contracture prevention    Skin assessment: all bony prominences were assessed    Findings:  Bruising noted on BUE, no redness or breakdown noted on sacrum.     OT recommendations for therapeutic positioning throughout hospitalization:   Follow [facility] Pressure Injury Prevention Protocol    Patient Education:  Patient provided with verbal education education regarding OT role/goals/POC, fall prevention, safety awareness, and Discharge/DME recommendations.  Additional teaching is warranted.      Patient left HOB elevated with L wedge, B mittens donned, B pressure relief boots, B SCDs, pillow between knees, LUE postioned on pillow, all lines intact, call button in reach, restraints reapplied at end of session, and RN notified    GOALS:   Multidisciplinary Problems       Occupational Therapy Goals          Problem: Occupational Therapy    Goal Priority Disciplines Outcome Interventions   Occupational Therapy Goal     OT, PT/OT Ongoing, Progressing    Description: LTG: Pt will perform basic ADLs and ADL transfers with Modified independence using LRAD by discharge.    STG: to be met by 2/9/24    Pt will complete grooming sitting EOB with LRAD with SBA.  Pt will complete UB dressing with SBA.  Pt will complete LB dressing with min A using LRAD and AE prn.  Pt will complete toileting with mod A using LRAD.  Pt will complete bedside commode transfer with mod A using LRAD.                        History:     Past Medical History:   Diagnosis Date    CKD (chronic kidney disease)     COPD (chronic obstructive pulmonary disease)     HLD (hyperlipidemia)     HTN (hypertension)     Merkel cell carcinoma     Paroxysmal A-fib     Prostate cancer     PUD (peptic ulcer disease)          Past Surgical History:   Procedure Laterality Date    INGUINAL HERNIA REPAIR      PROSTATE SURGERY      ROTATOR CUFF REPAIR         Time Tracking:     OT Date of Treatment: 01/09/24  OT Start Time: 1422  OT Stop Time: 1450  OT Total Time (min): 28 min    Billable Minutes:Evaluation Moderate Complexity.     1/9/2024

## 2024-01-09 NOTE — PLAN OF CARE
Problem: Physical Therapy  Goal: Physical Therapy Goal  Description: Goals to be met by: 24     Patient will increase functional independence with mobility by performin. Supine to sit with MInimal Assistance  2. Sit to supine with MInimal Assistance  3. Sit to stand transfer with Minimal Assistance  4. Gait  TBD  5. Sitting at edge of bed x15 minutes with Stand-by Assistance while maintaining adequate O2 saturations     Outcome: Ongoing, Progressing

## 2024-01-09 NOTE — PLAN OF CARE
Problem: Occupational Therapy  Goal: Occupational Therapy Goal  Description: LTG: Pt will perform basic ADLs and ADL transfers with Modified independence using LRAD by discharge.    STG: to be met by 2/9/24    Pt will complete grooming sitting EOB with LRAD with SBA.  Pt will complete UB dressing with SBA.  Pt will complete LB dressing with min A using LRAD and AE prn.  Pt will complete toileting with mod A using LRAD.  Pt will complete bedside commode transfer with mod A using LRAD.   Outcome: Ongoing, Progressing

## 2024-01-10 LAB
APTT PPP: 31.6 SECONDS (ref 23.2–33.7)
BASOPHILS # BLD AUTO: 0.03 X10(3)/MCL
BASOPHILS NFR BLD AUTO: 0.2 %
EOSINOPHIL # BLD AUTO: 0.06 X10(3)/MCL (ref 0–0.9)
EOSINOPHIL NFR BLD AUTO: 0.4 %
ERYTHROCYTE [DISTWIDTH] IN BLOOD BY AUTOMATED COUNT: 16.7 % (ref 11.5–17)
HCT VFR BLD AUTO: 39.6 % (ref 42–52)
HGB BLD-MCNC: 11.9 G/DL (ref 14–18)
IMM GRANULOCYTES # BLD AUTO: 0.17 X10(3)/MCL (ref 0–0.04)
IMM GRANULOCYTES NFR BLD AUTO: 1.1 %
LYMPHOCYTES # BLD AUTO: 1.14 X10(3)/MCL (ref 0.6–4.6)
LYMPHOCYTES NFR BLD AUTO: 7.3 %
MCH RBC QN AUTO: 26.2 PG (ref 27–31)
MCHC RBC AUTO-ENTMCNC: 30.1 G/DL (ref 33–36)
MCV RBC AUTO: 87 FL (ref 80–94)
MONOCYTES # BLD AUTO: 0.53 X10(3)/MCL (ref 0.1–1.3)
MONOCYTES NFR BLD AUTO: 3.4 %
NEUTROPHILS # BLD AUTO: 13.68 X10(3)/MCL (ref 2.1–9.2)
NEUTROPHILS NFR BLD AUTO: 87.6 %
NRBC BLD AUTO-RTO: 0 %
PLATELET # BLD AUTO: 156 X10(3)/MCL (ref 130–400)
PMV BLD AUTO: 12.4 FL (ref 7.4–10.4)
RBC # BLD AUTO: 4.55 X10(6)/MCL (ref 4.7–6.1)
WBC # SPEC AUTO: 15.61 X10(3)/MCL (ref 4.5–11.5)

## 2024-01-10 PROCEDURE — A4216 STERILE WATER/SALINE, 10 ML: HCPCS | Performed by: INTERNAL MEDICINE

## 2024-01-10 PROCEDURE — 97530 THERAPEUTIC ACTIVITIES: CPT

## 2024-01-10 PROCEDURE — 99497 ADVNCD CARE PLAN 30 MIN: CPT | Mod: ,,, | Performed by: NURSE PRACTITIONER

## 2024-01-10 PROCEDURE — 27100171 HC OXYGEN HIGH FLOW UP TO 24 HOURS

## 2024-01-10 PROCEDURE — 99233 SBSQ HOSP IP/OBS HIGH 50: CPT | Mod: ,,, | Performed by: NURSE PRACTITIONER

## 2024-01-10 PROCEDURE — 63600175 PHARM REV CODE 636 W HCPCS: Performed by: GENERAL PRACTICE

## 2024-01-10 PROCEDURE — 99900031 HC PATIENT EDUCATION (STAT)

## 2024-01-10 PROCEDURE — 25000003 PHARM REV CODE 250: Performed by: NURSE PRACTITIONER

## 2024-01-10 PROCEDURE — 99900035 HC TECH TIME PER 15 MIN (STAT)

## 2024-01-10 PROCEDURE — 94660 CPAP INITIATION&MGMT: CPT

## 2024-01-10 PROCEDURE — 94761 N-INVAS EAR/PLS OXIMETRY MLT: CPT

## 2024-01-10 PROCEDURE — 85730 THROMBOPLASTIN TIME PARTIAL: CPT | Performed by: NURSE PRACTITIONER

## 2024-01-10 PROCEDURE — 85025 COMPLETE CBC W/AUTO DIFF WBC: CPT | Performed by: NURSE PRACTITIONER

## 2024-01-10 PROCEDURE — 25000003 PHARM REV CODE 250: Performed by: GENERAL PRACTICE

## 2024-01-10 PROCEDURE — 63600175 PHARM REV CODE 636 W HCPCS: Mod: JZ,JG | Performed by: STUDENT IN AN ORGANIZED HEALTH CARE EDUCATION/TRAINING PROGRAM

## 2024-01-10 PROCEDURE — 25000003 PHARM REV CODE 250: Performed by: INTERNAL MEDICINE

## 2024-01-10 PROCEDURE — 20000000 HC ICU ROOM

## 2024-01-10 RX ADMIN — CEFTRIAXONE SODIUM 2 G: 2 INJECTION, POWDER, FOR SOLUTION INTRAMUSCULAR; INTRAVENOUS at 12:01

## 2024-01-10 RX ADMIN — OXYBUTYNIN CHLORIDE 5 MG: 5 TABLET ORAL at 08:01

## 2024-01-10 RX ADMIN — ATORVASTATIN CALCIUM 10 MG: 10 TABLET, FILM COATED ORAL at 08:01

## 2024-01-10 RX ADMIN — OXYBUTYNIN CHLORIDE 5 MG: 5 TABLET ORAL at 02:01

## 2024-01-10 RX ADMIN — SODIUM CHLORIDE, PRESERVATIVE FREE 10 ML: 5 INJECTION INTRAVENOUS at 06:01

## 2024-01-10 RX ADMIN — MORPHINE SULFATE 1 MG: 4 INJECTION, SOLUTION INTRAMUSCULAR; INTRAVENOUS at 12:01

## 2024-01-10 RX ADMIN — SODIUM CHLORIDE, PRESERVATIVE FREE 10 ML: 5 INJECTION INTRAVENOUS at 12:01

## 2024-01-10 RX ADMIN — AMIODARONE HYDROCHLORIDE 200 MG: 200 TABLET ORAL at 08:01

## 2024-01-10 RX ADMIN — CALCITRIOL CAPSULES 0.25 MCG 0.25 MCG: 0.25 CAPSULE ORAL at 08:01

## 2024-01-10 RX ADMIN — GABAPENTIN 300 MG: 300 CAPSULE ORAL at 08:01

## 2024-01-10 RX ADMIN — FAMOTIDINE 20 MG: 10 INJECTION, SOLUTION INTRAVENOUS at 08:01

## 2024-01-10 NOTE — PT/OT/SLP PROGRESS
Physical Therapy Treatment    Patient Name:  Francis Montero   MRN:  [id number]    Recommendations:     Discharge therapy intensity: Moderate Intensity Therapy (pending family decisions)   Discharge Equipment Recommendations: to be determined by next level of care  Barriers to discharge:  medical dx, impaired mobility, decreased tolerance to ax     Assessment:     Francis Montero is a 89 y.o. male admitted with a medical diagnosis of sepsis, acute cystitis, hypotension, pneumonia, respiratory failure, R pleural effusion.  He presents with the following impairments/functional limitations: weakness, impaired endurance, impaired balance, decreased lower extremity function, decreased upper extremity function, impaired self care skills, impaired functional mobility, impaired cognition, decreased safety awareness.    Patient required max stim to awaken. Remained lethargic throughout session.     Rehab Prognosis: Fair; patient would benefit from acute skilled PT services to address these deficits and reach maximum level of function.    Recent Surgery: * No surgery found *      Plan:     During this hospitalization, patient to be seen 5 x/week to address the identified rehab impairments via gait training, therapeutic activities, therapeutic exercises and progress toward the following goals:    Plan of Care Expires:  02/09/24    Subjective     Chief Complaint: n/a  Patient/Family Comments/goals: to get stronger   Pain/Comfort:  Pain Rating 1: 0/10      Objective:     Communicated with NSG prior to session.  Patient found HOB elevated with blood pressure cuff, pulse ox (continuous), oxygen, telemetry, NG tube, peripheral IV, SCD, pressure relief boots (B mittens) upon PT entry to room.     General Precautions: Standard, fall  Orthopedic Precautions: N/A  Braces: N/A  Respiratory Status:  bipap 75% fiO2  Blood Pressure: 110/55  SpO2: 96&-90%  Skin Integrity: Visible skin intact      Functional Mobility:  Bed Mobility:     Supine to  Sit: maximal assistance and of 2 persons  Sit to Supine: maximal assistance and of 2 persons  Balance: pt initially required maxA for static sitting balance 2/2 strong posterior lean, resistive to anterior tactile cues; pt progressed to SBA; pt remained lethargic while sitting and req max stim to keep eyes open; did not follow commands to kick LE; appeared uncomfortable with grimacing and inability to express needs      Education:  Patient provided with verbal education regarding PT role/goals/POC, safety awareness, discharge/DME recommendations, and pressure ulcer prevention.  Additional teaching is warranted.     Patient left HOB elevated with all lines intact, call button in reach, RN notified, and wedge placed on pt's L side, B mittens donned, B SCD donned, B pressure relief boots donned  ..    GOALS:   Multidisciplinary Problems       Physical Therapy Goals          Problem: Physical Therapy    Goal Priority Disciplines Outcome Goal Variances Interventions   Physical Therapy Goal     PT, PT/OT Ongoing, Progressing     Description: Goals to be met by: 24     Patient will increase functional independence with mobility by performin. Supine to sit with MInimal Assistance  2. Sit to supine with MInimal Assistance  3. Sit to stand transfer with Minimal Assistance  4. Gait  TBD  5. Sitting at edge of bed x15 minutes with Stand-by Assistance while maintaining adequate O2 saturations                          Time Tracking:     PT Received On: 01/10/24  PT Start Time: 1423     PT Stop Time: 1442  PT Total Time (min): 19 min     Billable Minutes: Therapeutic Activity 1    Treatment Type: Treatment  PT/PTA: PT     Number of PTA visits since last PT visit: 0     01/10/2024

## 2024-01-10 NOTE — PROGRESS NOTES
Inpatient Nutrition Assessment    Admit Date: 1/2/2024   Total duration of encounter: 8 days   Patient Age: 89 y.o.    Nutrition Recommendation/Prescription     Tube feeding recs if aggressive care ton continue:    Peptamen AF goal rate 70 ml/hr to provide  1680 kcal/d (100% est needs)  105 g protein/d (145% est needs)  1134 ml free water/d   (calculations based on estimated 20 hr/d run time)     Fluids per MD.    Communication of Recommendations: reviewed with nurse    Nutrition Assessment     Malnutrition Assessment/Nutrition-Focused Physical Exam    Malnutrition Context: acute illness or injury (01/03/24 1223)  Malnutrition Level:  (does not meet criteria) (01/03/24 1223)  Energy Intake (Malnutrition):  (unable to eval) (01/03/24 1223)  Weight Loss (Malnutrition):  (unable to eval) (01/03/24 1223)  Subcutaneous Fat (Malnutrition):  (does not meet criteria) (01/03/24 1223)           Muscle Mass (Malnutrition): mild depletion (01/03/24 1223)  Yarsani Region (Muscle Loss): mild depletion  Clavicle Bone Region (Muscle Loss): mild depletion                    Fluid Accumulation (Malnutrition):  (does not meet criteria) (01/03/24 1223)        A minimum of two characteristics is recommended for diagnosis of either severe or non-severe malnutrition.    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, COPD, prostate cancer (s/p ureteral stent placement), paroxysmal AFib, CKD, HTN, HLD, prostate  cancer, and Merkel cell carcinoma     Scheduled Medications:  amiodarone, 200 mg, BID   Followed by  [START ON 1/11/2024] amiodarone, 200 mg, Daily  atorvastatin, 10 mg, Daily  calcitRIOL, 0.25 mcg, Daily  cefTRIAXone (ROCEPHIN) IVPB, 2 g, Q24H  famotidine (PF), 20 mg, Daily  gabapentin, 300 mg, QHS  oxybutynin, 5 mg, TID  sodium chloride 0.9%, 10 mL, Q6H    Continuous Infusions:  sodium chloride 0.9%, Last Rate: 10 mL/hr at 01/09/24 0547  albuterol 0.083%, Last Rate: 10 mg/hr (01/02/24 1256)  NORepinephrine bitartrate-D5W, Last Rate: Stopped (01/06/24 0434)    PRN Medications: acetaminophen, acetaminophen, albuterol-ipratropium, melatonin, metoprolol, morphine, naloxone, ondansetron, prochlorperazine, simethicone, sodium chloride 0.9%, sodium chloride 0.9%, Flushing PICC/Midline Protocol **AND** sodium chloride 0.9% **AND** sodium chloride 0.9%    Calorie Containing IV Medications: no significant kcals from medications at this time    Recent Labs   Lab 01/04/24  0108 01/05/24  0124 01/06/24  0159 01/07/24  0259 01/08/24  0237 01/08/24  0542 01/09/24  0401 01/10/24  0230    141  --  146  --  146 152*  --    K 4.3 3.9  --  3.6  --  3.8 3.8  --    CALCIUM 8.5* 9.6  --  9.0  --  9.1 9.1  --    MG  --   --   --   --   --   --  2.30  --    CHLORIDE 109 110  --  113*  --  115* 120*  --    CO2 17* 19*  --  20*  --  21* 23  --    BUN 81.6* 86.3*  --  95.0*  --  98.0* 94.5*  --    CREATININE 2.66* 2.63*  --  2.81*  --  2.52* 2.21*  --    EGFRNORACEVR 22 22  --  20  --  23 27  --    GLUCOSE 159* 114  --  100  --  103 122*  --    BILITOT 0.4 0.4  --  0.5  --  0.6 0.4  --    ALKPHOS 70 80  --  80  --  103 107  --    ALT 18 15  --  11  --  13 12  --    AST 39* 34  --  30  --  31 29  --    ALBUMIN 2.0* 1.8*  --  1.8*  --  1.9* 1.7*  --    WBC 10.33  10.33 9.49  9.49 8.42 8.57 11.44  --  14.74* 15.61*   HGB 11.5* 12.0* 12.4* 11.8* 11.9*  --  11.4* 11.9*   HCT 35.1* 37.3* 39.7* 38.8* 37.5*  --  36.4* 39.6*       Nutrition  "Orders:  Diet NPO  Tube Feedings/Formulas 70; 1,400; Peptamen AF; NG; 50; Every 4 hours    Appetite/Oral Intake: NPO/NPO  Factors Affecting Nutritional Intake: NPO  Food/Caodaism/Cultural Preferences: none reported  Food Allergies: none reported  Last Bowel Movement: 01/01/24  Wound(s):     Altered Skin Integrity 01/08/24 Nose Purple or maroon localized area of discolored intact skin or non-intact skin or a blood-filled blister.-Tissue loss description: Not applicable    Comments    1/3/24: Pt unable to verify much subjective info at this time, unsure of UBW. Agreeable to ONS once able to have po intake of meals. Noted pt now NPO. Noted current GFR, renal consulted. Will monitor for need for change to est needs/recommendations.     1/6/24: Consult received for tube feedings, no family in room, pt unable to give info at this time, nurse reports she was told pt had little to no po intake prior to admit on 1/2, has been npo through admit other than brief stent of regular diet from almost midnight 1/2 to morning of 1/3. Discussed that pt is likely at refeeding syndrome risk.     1/10/24: TF started, running @ lower rate. Discussed with RN, plans to increase to goal rate. Noted current GFR. Previous noted indicates plans for nephrology consult, but no note noted. Discussed with RN, possibly transitioning to palliative/hospice care.    Anthropometrics    Height: 5' 5" (165.1 cm), Height Method: Stated  Last Weight: 72.5 kg (159 lb 13.3 oz) (01/02/24 2220), Weight Method: Bed Scale  BMI (Calculated): 26.6  BMI Classification: overweight (BMI 25-29.9)        Ideal Body Weight (IBW), Male: 136 lb     % Ideal Body Weight, Male (lb): 117.53 %                          Usual Weight Provided By: unable to obtain usual weight    Wt Readings from Last 5 Encounters:   01/02/24 72.5 kg (159 lb 13.3 oz)     Weight Change(s) Since Admission:   Wt Readings from Last 1 Encounters:   01/02/24 2220 72.5 kg (159 lb 13.3 oz)   01/02/24 0930 " 77.1 kg (170 lb)   Admit Weight: 77.1 kg (170 lb) (01/02/24 0930), Weight Method: Estimated    Estimated Needs    Weight Used For Calorie Calculations: 72.5 kg (159 lb 13.3 oz)  Energy Calorie Requirements (kcal): 1680kcal (1.3 stress factor)  Energy Need Method: [place] Jeor  Weight Used For Protein Calculations: 72.5 kg (159 lb 13.3 oz)  Protein Requirements: 58-73gm (0.8-1g/kg) - may need to be updated pending renal function  Fluid Requirements (mL): per MD    Enteral Nutrition     Patient not receiving enteral nutrition at this time.    Parenteral Nutrition     Patient not receiving parenteral nutrition support at this time.    Evaluation of Received Nutrient Intake    Calories: not meeting estimated needs  Protein: not meeting estimated needs    Patient Education     Not applicable.    Nutrition Diagnosis     PES: Inadequate oral intake related to acute illness as evidenced by NPO since 1/3/24. (active)     Nutrition Interventions     Intervention(s): general/healthful diet, modified composition of enteral nutrition, modified rate of enteral nutrition, commercial beverage, and collaboration with other providers    Goal: Meet greater than 80% of nutritional needs by follow-up. (goal progressing)  Goal: Consume % of meals/snacks by follow-up. (goal progressing)    Nutrition Goals & Monitoring     Dietitian will monitor: energy intake    Nutrition Risk/Follow-Up: high (follow-up in 1-4 days)   Please consult if re-assessment needed sooner.

## 2024-01-10 NOTE — NURSING
Nurses Note -- 4 Eyes      1/10/2024   3:00 PM      Skin assessed during: Daily Assessment      [x] No Altered Skin Integrity Present    []Prevention Measures Documented      [] Yes- Altered Skin Integrity Present or Discovered   [] LDA Added if Not in Epic (Describe Wound)   [] New Altered Skin Integrity was Present on Admit and Documented in LDA   [] Wound Image Taken    Wound Care Consulted? No    Attending Nurse:  Mariam Chua RN/Staff Member:

## 2024-01-10 NOTE — PROGRESS NOTES
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  Patient was originally started on IV antibiotics however during his stay began to become progressively hypotensive.  Patient received 3 L fluid boluses which did not improve patient's blood pressure to a map greater than 65 at which point patient was initiated on Levophed for hemodynamic support.  Of note, patient is visiting [place] and reports that he has been feeling somewhat ill the last 2 days.  Admits to dysuria, fatigue, myalgia, shortness a breath.  Reports no chest pain, palpitations, fevers, chills, nausea, vomiting.  ICU was consulted for upgrade.    Hospital Course/Significant events:  01/01/2024: Admitted to [facility]  01/02/2024: Upgraded to ICU, initiated on Levophed  01/04/2024: PICC line placed   01/05/2024: Suprapubic catheter exchanged    24 Hour Interval History:  No acute events occurred overnight. Vital signs remained stable and patient is afebrile. Continue with daily Rocephin. Remains on BiPAP. Palliative team held discussion with pt's son yesterday; wife will be here today for further GOC discussion.      Past Medical History:   Diagnosis Date    CKD (chronic kidney disease)     COPD (chronic obstructive pulmonary disease)     HLD (hyperlipidemia)     HTN (hypertension)     Merkel cell carcinoma     Paroxysmal A-fib     Prostate cancer      PUD (peptic ulcer disease)      Past Surgical History:   Procedure Laterality Date    INGUINAL HERNIA REPAIR      PROSTATE SURGERY      ROTATOR CUFF REPAIR       Social History     Socioeconomic History    Marital status:      Social Determinants of Health     Housing Stability: Unknown (1/3/2024)    Housing Stability Vital Sign     Unable to Pay for Housing in the Last Year: No     Current Outpatient Medications   Medication Instructions    atorvastatin (LIPITOR) 10 mg, Oral, Daily    calcitRIOL (ROCALTROL) 0.25 mcg, Oral, Daily, Takes every other day    gabapentin (NEURONTIN) 300 mg, Oral, Nightly    metoprolol tartrate (LOPRESSOR) 25 mg, Oral, 2 times daily    NON FORMULARY MEDICATION 850 mg, Oral, Daily, Umary     Current Inpatient Medications   amiodarone  200 mg Oral BID    Followed by    [START ON 1/11/2024] amiodarone  200 mg Oral Daily    atorvastatin  10 mg Oral Daily    calcitRIOL  0.25 mcg Per NG tube Daily    cefTRIAXone (ROCEPHIN) IVPB  2 g Intravenous Q24H    famotidine (PF)  20 mg Intravenous Daily    gabapentin  300 mg Oral QHS    oxybutynin  5 mg Oral TID    sodium chloride 0.9%  10 mL Intravenous Q6H     Current Intravenous Infusions   sodium chloride 0.9% 10 mL/hr at 01/09/24 0547    albuterol 0.083% 10 mg/hr (01/02/24 1256)    NORepinephrine bitartrate-D5W Stopped (01/06/24 0434)       Review of Systems   Reason unable to perform ROS: due to critical illness.      Objective:       Intake/Output Summary (Last 24 hours) at 1/10/2024 0336  Last data filed at 1/9/2024 1722  Gross per 24 hour   Intake 707.18 ml   Output 1010 ml   Net -302.82 ml     Vital Signs (Most Recent):  Temp: 98.4 °F (36.9 °C) (01/09/24 1600)  Pulse: (!) 45 (01/09/24 1836)  Resp: (!) 24 (01/10/24 0015)  BP: (!) 126/48 (01/09/24 1802)  SpO2: (!) 91 % (01/09/24 1836)  Body mass index is 26.6 kg/m².  Weight: 72.5 kg (159 lb 13.3 oz) Vital Signs (24h Range):  Temp:  [97.9 °F (36.6 °C)-98.4 °F (36.9 °C)] 98.4 °F (36.9  °C)  Pulse:  [45-74] 45  Resp:  [13-28] 24  SpO2:  [91 %-100 %] 91 %  BP: ()/(43-69) 126/48     Physical Exam  Constitutional:       General: He is not in acute distress.     Appearance: He is ill-appearing.      Comments: Pulling at mitts and rolling around in bed on exam.   HENT:      Head:      Comments: CPAP in place. PICC line placed, dressing intact.   Cardiovascular:      Rate and Rhythm: Tachycardia present.      Pulses: Normal pulses.      Heart sounds: Murmur heard.      No friction rub. No gallop.      Comments: Systolic murmur noted  Pulmonary:      Effort: Pulmonary effort is normal.      Breath sounds: No stridor. Rhonchi and rales present. No wheezing.   Abdominal:      General: Bowel sounds are normal.      Palpations: Abdomen is soft.      Tenderness: There is no abdominal tenderness. There is no guarding.   Musculoskeletal:         General: No swelling.      Right lower leg: No edema.      Left lower leg: No edema.   Neurological:      General: No focal deficit present.      Mental Status: He is alert and oriented to person, place, and time.       Lines/Drains/Airways       Peripherally Inserted Central Catheter Line  Duration             PICC Triple Lumen 01/04/24 1725 left basilic 5 days              Drain  Duration                  NG/OG Tube 01/05/24 1500 Crosby sump 16 Fr. Left nostril 4 days         Suprapubic Catheter 01/05/24 1720 24 Fr. 4 days              Peripheral Intravenous Line  Duration                  Peripheral IV - Single Lumen 01/04/24 1459 20 G Anterior;Left;Proximal Forearm 5 days                    Significant Labs:  Lab Results   Component Value Date    WBC 15.61 (H) 01/10/2024    HGB 11.9 (L) 01/10/2024    HCT 39.6 (L) 01/10/2024    MCV 87.0 01/10/2024     01/10/2024     BMP  Lab Results   Component Value Date     (H) 01/09/2024    K 3.8 01/09/2024    CHLORIDE 120 (H) 01/09/2024    CO2 23 01/09/2024    BUN 94.5 (H) 01/09/2024    CREATININE 2.21 (H)  01/09/2024    CALCIUM 9.1 01/09/2024     ABG  Recent Labs   Lab 01/07/24  2330   PH 7.340*   PO2 64.0*   PCO2 43.0   HCO3 23.2   POCBASEDEF -2.50*     Mechanical Ventilation Support:  Oxygen Concentration (%): 85 (01/10/24 0047)    Significant Imaging:  Imaging Results              X-Ray Chest AP Portable (Final result)  Result time 01/02/24 21:00:06      Final result by Darrius Mcpherson MD (01/02/24 21:00:06)                   Impression:      Changes most consistent with pulmonary edema and a large right-sided pleural effusion.  This appears grossly stable from the previous exam      Electronically signed by: Darrius Mcpherson MD  Date:    01/02/2024  Time:    21:00               Narrative:    EXAMINATION:  XR CHEST AP PORTABLE    CLINICAL HISTORY:  Shortness of breath    TECHNIQUE:  Single frontal view of the chest was performed.    COMPARISON:  01/02/2024    FINDINGS:  There is significant opacification of the right hemithorax cannot rule out a large effusion.  There are calcified pleural plaques suggestive of previous X best is exposure.  There are increased markings bilaterally most consistent with pulmonary edema                                       US Retroperitoneal Complete (Final result)  Result time 01/02/24 15:46:53      Final result by Soren Miranda MD (01/02/24 15:46:53)                   Impression:      1. No hydronephrosis.  2. Medical renal disease.      Electronically signed by: Soren Miranda  Date:    01/02/2024  Time:    15:46               Narrative:    EXAMINATION:  US RETROPERITONEAL COMPLETE    CLINICAL HISTORY:  elevated BUN/Creat;    COMPARISON:  None.    FINDINGS:  Grayscale and color Doppler sonographic evaluation of the kidneys and urinary bladder.    The right kidney measures 8 cm. The left kidney measures 8 cm.   No hydronephrosis.  Heterogeneous renal parenchymal echogenicity with loss of corticomedullary differentiation.    Bladder is not visualized.                                        CT Head Without Contrast (Final result)  Result time 01/02/24 15:12:29      Final result by Alejandrina Rodriguez MD (01/02/24 15:12:29)                   Impression:      1. No acute intracranial abnormality.  2. Chronic microvascular ischemic changes.      Electronically signed by: Alejandrina Rodriguez  Date:    01/02/2024  Time:    15:12               Narrative:    EXAMINATION:  CT HEAD WITHOUT CONTRAST    CLINICAL HISTORY:  Mental status change, unknown cause;    TECHNIQUE:  Axial scans were obtained from skull base to the vertex.    Coronal and sagittal reconstructions obtained from the axial data.    Automatic exposure control was utilized to limit radiation dose.    Contrast: None    Radiation Dose:    Total DLP: 1029 mGy*cm    COMPARISON:  None    FINDINGS:  There is no acute intracranial hemorrhage or edema.  There is a small area of encephalomalacia in the left cerebellum.    There is no mass effect or midline shift.  There is diffuse parenchymal volume loss.  The basal cisterns are patent. There is no abnormal extra-axial fluid collection.  Carotid artery calcifications are noted.    The calvarium and skull base are intact.  There are bilateral mastoid effusions, right greater than left.                                       X-Ray Chest 1 View (Final result)  Result time 01/02/24 10:38:49      Final result by Eligio Shaikh MD (01/02/24 10:38:49)                   Impression:      Changes suggestive of pulmonary vascular congestion and cardiac decompensation.    Slightly more confluent opacities at the right base infiltrate cannot be completely excluded.    Right-sided pleural effusion      Electronically signed by: Eligio Shaikh  Date:    01/02/2024  Time:    10:38               Narrative:    EXAMINATION:  XR CHEST 1 VIEW    CPT 55424    CLINICAL HISTORY:  SOB;    FINDINGS:  Examination reveals mediastinal silhouette to be within normal limits cardiac silhouette is not enlarged there is increase  in interstitial and pulmonary vascular markings indicating some degree of pulmonary vascular congestion and cardiac decompensation.    Slightly more confluent opacities identified at the right base superimposed infiltrate can not be completely excluded.    There is blunting of the right costophrenic angle representing a right-sided pleural effusion    No other focal consolidative changes                                   I have reviewed the pertinent imaging within the past 24 hours.    Assessment/Plan:     Assessment  Septic Shock - improving  Acute Cystitis without hematuria  -Hx of urinary retension   - hx of urethral stent  -initial urine culture positive for klebsiella pneumoniae  Community Acquired Pneumonia  Bacteremia -strep pneumoniae  Acute Hypoxic on chronic hypercarbic respiratory failure  - hx of COPD  - hx of asbestosis, pulmonary fibrosis?  Right pleural effusion  Acute renal failure  - hx of CKD with unknown baseline  Metabolic Acidosis  - lactic acidosis, respiratory compensation  NSTEMI  HFpEF, decompensated EF 60% with diastolic failure on most recent TTE  Hyperkalemia - resolved  Hypomagnesemia - resolved  Paroxysmal Afib  Hx of prostate cancer  Hx of merkel cell carcinoma      Plan  -Continue critical care level management at this time.   -Currently off pressors, maintain MAP goal >65   -Continue with supplement oxygen, can titrate for goal SpO2 88-92% and wean as tolerated   -Continue Nebulized treatments for wheezing  -Cardiology signed off, appreciate their assistance   -Discontinue heparin due to thrombocytopenia    -Amiodarone oral taper    -Consider digoxin if further HR control if needed with caution due to renal dysfunction    -Hold beta blocker given hypotension, resume with BP improves    -Tolerated Lasix trials   -ID following, appreciate recommendations   -Awaiting blood cultures  -Recommended CT chest   -Retroperitoneal U/S negative for hydronephrosis  -Continue Merrem 1/5/24 as  there was no improvement with Rocephin initially; possible transition back   -Initial BC done on 1/2/24 + Strep Pneumo 2/2 on ; sensitive to gent, meropenem and zosyn  -Repeat blood cultures x 2 collected on 1/5/24 pending   -Repeat urine culture with yeast seen   -Initiated on Rocephin 1/9  -Urology consulted and signed off on 1/5/24, appreciate their assistance  -low suspicion that sepsis is from his catheter  -Awaiting CT scan of abdomen to r/o abscess   -Supra pubic bladder catheter exchanged 1/5/24   -Stated ditropan 1/8  -Nephrology evaluated pt and signed off at this time. Appreciate their assistance  -Patient's family discussing potential transferred to be closer to where they [facility], we will discuss with case management in the morning if this is possible  -Family elected to change pt's code status to chemical code only.      CODE STATUS: Partial Code - chemical code   Consults: Urology, Nephrology, Cardiology  Access: Peripheral  Drains: suprapubic catheter replaced 1/5/24   Diet: Diet NPO  Fluids: none  Pressors: None   Oxygenation: CPAP w Fio2 70  DVT Prophylaxis: Heparin  GI Prophylaxis: none  I&Os: pending 01/09 0701 - 01/10 0700  In: -   Out: 460 [Urine:460]        32 minutes of critical care was time spent personally by me on the following activities: development of treatment plan with patient or surrogate and bedside caregivers, discussions with consultants, evaluation of patient's response to treatment, examination of patient, ordering and performing treatments and interventions, ordering and review of laboratory studies, ordering and review of radiographic studies, pulse oximetry, re-evaluation of patient's condition.  This critical care time did not overlap with that of any other provider or involve time for any procedures.     Alexander Andrade MD  Pulmonary Critical Care Medicine  [facility] - [address] [facility] [address]   DOS: 01/10/2024

## 2024-01-10 NOTE — CONSULTS
"Consults    Patient Name: Francis Montero   MRN: [id number]   Admission Date: 1/2/2024   Hospital Length of Stay: 8   Attending Provider: Gurdeep Shah MD   Consulting Provider: Julieta SCHMITZ  Reason for Consult: Goals of Care  Primary Care Physician:  No, Primary Doctor     Principal Problem: SOB (shortness of breath)       Final diagnoses:  [R06.02] SOB (shortness of breath)  [I50.9] Acute exacerbation of CHF (congestive heart failure)  [J90] Pleural effusion      Assessment/Plan:     I reviewed the patient and family's understanding of the seriousness of the illness and its expected prognosis. We discussed the patient's goals of care and treatment preferences.        Advance Care Planning     Date: 01/10/2024    [facility]  I engaged the family in a voluntary conversation about advance care planning and we specifically addressed what the goals of care would be moving forward, in light of the patient's change in clinical status, specifically current condition.  We did specifically address the patient's likely prognosis, which is poor.  We explored the patient's values and preferences for future care.  The family endorses that what is most important right now is to focus on comfort and QOL     Accordingly, we have decided that the best plan to meet the patient's goals includes continuing with treatment    Met with wife and her sister and discussed patient's persistent poor respiratory status despite all treatment.  Wife expressed that patient has been tired and ready to see Jaden."  Discussed options of continued treatment or transitioned to comfort measures in the hospital.  I asked wife if she would like for me to speak with her son to which she informed that she would.    Called son Don to discuss above conversation.  I discussed with son that although family had voiced that they wanted to wait a week, medical team is concerned about patient's quality of life on BiPAP for the next week.  I asked son if he " was arriving in town to which he states that his wife works for the airlines and it would be easy for him to arrive as soon as possible.  He asked about comfort measures and expressed his concern that patient would suffer.  Discuss comfort protocol at length with son.  Son had to leave the conversation because his mother was calling him.       Son called back and we spoke about above conversation further.  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  States that he is flying in to [place] this evening and will be at hospital tomorrow morning.  We discussed withdrawal of BiPAP and comfort measures further to which he states that he and his mother have decided that they will pursue comfort measures likely tomorrow.  I informed that palliative team would be at the hospital to assist.  He verbalized understanding.        Interval History:     Patient remains hemodynamically stable on 75% FIO2 on the bipap.  Receiving antibiotic therapy.  Palliative care meeting with family to discuss goals of care       Active Ambulatory Problems     Diagnosis Date Noted    No Active Ambulatory Problems     Resolved Ambulatory Problems     Diagnosis Date Noted    No Resolved Ambulatory Problems     Past Medical History:   Diagnosis Date    CKD (chronic kidney disease)     COPD (chronic obstructive pulmonary disease)     HLD (hyperlipidemia)     HTN (hypertension)     Merkel cell carcinoma     Paroxysmal A-fib     Prostate cancer     PUD (peptic ulcer disease)         Past Surgical History:   Procedure Laterality Date    INGUINAL HERNIA REPAIR      PROSTATE SURGERY      ROTATOR CUFF REPAIR          Review of patient's allergies indicates:  No Known Allergies       Current Facility-Administered Medications:     0.9%  NaCl infusion, , Intravenous, Continuous, Bernardino Ordoñez MD, Last Rate: 10 mL/hr at 01/09/24 0547, Rate Verify at 01/09/24 0547    acetaminophen tablet 1,000  mg, 1,000 mg, Oral, Q6H PRN, Grisel Valentine FNP, 1,000 mg at 01/09/24 0117    acetaminophen tablet 650 mg, 650 mg, Oral, Q4H PRN, Grisel Valentine FNP, 650 mg at 01/07/24 1447    albuterol nebulizer solution, 10 mg/hr, Nebulization, Continuous, Mendoza Mckeon MD, Last Rate: 12 mL/hr at 01/02/24 1256, 10 mg/hr at 01/02/24 1256    albuterol-ipratropium 2.5 mg-0.5 mg/3 mL nebulizer solution 3 mL, 3 mL, Nebulization, Q4H PRN, Grisel Valentine FNP, 3 mL at 01/03/24 0229    [COMPLETED] amiodarone tablet 400 mg, 400 mg, Oral, BID, 400 mg at 01/08/24 0928 **FOLLOWED BY** amiodarone tablet 200 mg, 200 mg, Oral, BID, 200 mg at 01/10/24 0857 **FOLLOWED BY** [START ON 1/11/2024] amiodarone tablet 200 mg, 200 mg, Oral, Daily, Charles Taveras FNP    atorvastatin tablet 10 mg, 10 mg, Oral, Daily, Grisel Valentine FNP, 10 mg at 01/10/24 0857    calcitRIOL capsule 0.25 mcg, 0.25 mcg, Per NG tube, Daily, Gurdeep Shah MD, 0.25 mcg at 01/10/24 0858    cefTRIAXone (ROCEPHIN) 2 g in dextrose 5 % in water (D5W) 100 mL IVPB (MB+), 2 g, Intravenous, Q24H, Juan Sanchez MD, Stopped at 01/09/24 1257    famotidine (PF) injection 20 mg, 20 mg, Intravenous, Daily, Gurdeep Shah MD, 20 mg at 01/10/24 0857    gabapentin capsule 300 mg, 300 mg, Oral, QHS, Grisel Valentine FNP, 300 mg at 01/09/24 2030    melatonin tablet 6 mg, 6 mg, Oral, Nightly PRN, Grisel Valentine FNP, 6 mg at 01/07/24 2024    metoprolol injection 5 mg, 5 mg, Intravenous, Q5 Min PRN, Wendy Gabriel FNP    morphine injection 1 mg, 1 mg, Intravenous, Q8H PRN, Thomas Palacios MD, 1 mg at 01/10/24 0015    naloxone 0.4 mg/mL injection 0.02 mg, 0.02 mg, Intravenous, PRN, Grisel Valentine FNP    NORepinephrine 8 mg in dextrose 5% 250 mL infusion, 0-3 mcg/kg/min, Intravenous, Continuous, Fadumo Ramirez DO, Stopped at 01/06/24 0434    ondansetron injection 4 mg, 4 mg, Intravenous, Q4H PRN, Grisel Valentine FNP     "oxybutynin tablet 5 mg, 5 mg, Oral, TID, Arianna Ornelas, AGACNP-BC, 5 mg at 01/10/24 0857    prochlorperazine injection Soln 5 mg, 5 mg, Intravenous, Q6H PRN, Grisel Valentine, FNP    simethicone chewable tablet 80 mg, 1 tablet, Oral, QID PRN, Grisel Valentine, FNP    sodium chloride 0.9% flush 10 mL, 10 mL, Intravenous, PRN, Grisel Valentine A, FNP    sodium chloride 0.9% flush 10 mL, 10 mL, Intravenous, PRN, Manny Sawant, DO    Flushing PICC/Midline Protocol, , , Until Discontinued **AND** sodium chloride 0.9% flush 10 mL, 10 mL, Intravenous, Q6H, 10 mL at 01/10/24 0600 **AND** sodium chloride 0.9% flush 10 mL, 10 mL, Intravenous, PRN, Bernardino Ordoñez MD     acetaminophen, acetaminophen, albuterol-ipratropium, melatonin, metoprolol, morphine, naloxone, ondansetron, prochlorperazine, simethicone, sodium chloride 0.9%, sodium chloride 0.9%, Flushing PICC/Midline Protocol **AND** sodium chloride 0.9% **AND** sodium chloride 0.9%     No family history on file.       Review of Systems   Unable to perform ROS: Acuity of condition            Objective:   /63   Pulse 75   Temp 98.2 °F (36.8 °C)   Resp 15   Ht 5' 5" (1.651 m)   Wt 72.5 kg (159 lb 13.3 oz)   SpO2 95%   BMI 26.60 kg/m²      Physical Exam   Constitutional: He appears ill.   HENT:   Head: Normocephalic.   Eyes: Pupils are equal, round, and reactive to light.   Cardiovascular: An irregular rhythm present. Pulmonary:      Breath sounds: Rhonchi present.     Abdominal: Soft.   Neurological:   To person   Skin: There is pallor.          Review of Symptoms  Review of Symptoms      Symptom Assessment (ESAS 0-10 Scale)  Pain:  0  Dyspnea:  0  Anxiety:  0  Nausea:  0  Depression:  0  Anorexia:  0  Fatigue:  0  Insomnia:  0  Restlessness:  0  Agitation:  0         Bowel Management Plan (BMP):  Yes      Psychosocial/Cultural:   See Palliative Psychosocial Note: Yes  **Primary  to Follow**  Palliative Care Social " Worker Consult: No      Advance Care Planning   Advance Directives:   Goals of Care: What is most important right now is to focus on improvement in condition but with limits to invasive therapies. Accordingly, we have decided that the best plan to meet the patient's goals includes continuing with treatment.          PAINAD: NA    Caregiver burden formerly assessed: Yes      No results displayed because visit has over 200 results.               > 50% of 35 min of encounter was spent in chart review, face to face discussion of goals of care, symptom assessment, coordination of care and emotional support.    20 minutes of ACP discussion    Julieta MATHISP, [facility]  Palliative Medicine  [facility] - Observation Unit

## 2024-01-11 VITALS
RESPIRATION RATE: 18 BRPM | BODY MASS INDEX: 26.63 KG/M2 | HEART RATE: 60 BPM | DIASTOLIC BLOOD PRESSURE: 56 MMHG | SYSTOLIC BLOOD PRESSURE: 98 MMHG | HEIGHT: 65 IN | WEIGHT: 159.81 LBS | TEMPERATURE: 98 F | OXYGEN SATURATION: 98 %

## 2024-01-11 PROBLEM — Z51.5 COMFORT MEASURES ONLY STATUS: Status: ACTIVE | Noted: 2024-01-11

## 2024-01-11 LAB
APTT PPP: 29.5 SECONDS (ref 23.2–33.7)
BASOPHILS # BLD AUTO: 0.02 X10(3)/MCL
BASOPHILS NFR BLD AUTO: 0.1 %
EOSINOPHIL # BLD AUTO: 0.1 X10(3)/MCL (ref 0–0.9)
EOSINOPHIL NFR BLD AUTO: 0.6 %
ERYTHROCYTE [DISTWIDTH] IN BLOOD BY AUTOMATED COUNT: 16.9 % (ref 11.5–17)
HCT VFR BLD AUTO: 36.7 % (ref 42–52)
HGB BLD-MCNC: 11.2 G/DL (ref 14–18)
IMM GRANULOCYTES # BLD AUTO: 0.19 X10(3)/MCL (ref 0–0.04)
IMM GRANULOCYTES NFR BLD AUTO: 1.1 %
LYMPHOCYTES # BLD AUTO: 1.26 X10(3)/MCL (ref 0.6–4.6)
LYMPHOCYTES NFR BLD AUTO: 7.1 %
MCH RBC QN AUTO: 26.4 PG (ref 27–31)
MCHC RBC AUTO-ENTMCNC: 30.5 G/DL (ref 33–36)
MCV RBC AUTO: 86.4 FL (ref 80–94)
MONOCYTES # BLD AUTO: 0.76 X10(3)/MCL (ref 0.1–1.3)
MONOCYTES NFR BLD AUTO: 4.3 %
NEUTROPHILS # BLD AUTO: 15.33 X10(3)/MCL (ref 2.1–9.2)
NEUTROPHILS NFR BLD AUTO: 86.8 %
NRBC BLD AUTO-RTO: 0 %
PLATELET # BLD AUTO: 177 X10(3)/MCL (ref 130–400)
PMV BLD AUTO: 12.1 FL (ref 7.4–10.4)
RBC # BLD AUTO: 4.25 X10(6)/MCL (ref 4.7–6.1)
WBC # SPEC AUTO: 17.66 X10(3)/MCL (ref 4.5–11.5)

## 2024-01-11 PROCEDURE — 25000003 PHARM REV CODE 250: Performed by: INTERNAL MEDICINE

## 2024-01-11 PROCEDURE — 85025 COMPLETE CBC W/AUTO DIFF WBC: CPT | Performed by: NURSE PRACTITIONER

## 2024-01-11 PROCEDURE — 25000003 PHARM REV CODE 250: Performed by: NURSE PRACTITIONER

## 2024-01-11 PROCEDURE — 94660 CPAP INITIATION&MGMT: CPT

## 2024-01-11 PROCEDURE — A4216 STERILE WATER/SALINE, 10 ML: HCPCS | Performed by: INTERNAL MEDICINE

## 2024-01-11 PROCEDURE — 99900035 HC TECH TIME PER 15 MIN (STAT)

## 2024-01-11 PROCEDURE — 63600175 PHARM REV CODE 636 W HCPCS: Performed by: GENERAL PRACTICE

## 2024-01-11 PROCEDURE — 99233 SBSQ HOSP IP/OBS HIGH 50: CPT | Mod: ,,, | Performed by: NURSE PRACTITIONER

## 2024-01-11 PROCEDURE — 63600175 PHARM REV CODE 636 W HCPCS: Performed by: NURSE PRACTITIONER

## 2024-01-11 PROCEDURE — 94761 N-INVAS EAR/PLS OXIMETRY MLT: CPT

## 2024-01-11 PROCEDURE — 25000003 PHARM REV CODE 250: Performed by: GENERAL PRACTICE

## 2024-01-11 PROCEDURE — 85730 THROMBOPLASTIN TIME PARTIAL: CPT | Performed by: NURSE PRACTITIONER

## 2024-01-11 PROCEDURE — 63600175 PHARM REV CODE 636 W HCPCS: Mod: JZ,JG | Performed by: STUDENT IN AN ORGANIZED HEALTH CARE EDUCATION/TRAINING PROGRAM

## 2024-01-11 PROCEDURE — 27100171 HC OXYGEN HIGH FLOW UP TO 24 HOURS

## 2024-01-11 PROCEDURE — 99497 ADVNCD CARE PLAN 30 MIN: CPT | Mod: ,,, | Performed by: NURSE PRACTITIONER

## 2024-01-11 RX ORDER — LORAZEPAM 2 MG/ML
1 INJECTION INTRAMUSCULAR EVERY 4 HOURS PRN
Status: DISCONTINUED | OUTPATIENT
Start: 2024-01-11 | End: 2024-01-11 | Stop reason: HOSPADM

## 2024-01-11 RX ORDER — MORPHINE SULFATE 4 MG/ML
4 INJECTION, SOLUTION INTRAMUSCULAR; INTRAVENOUS
Status: DISCONTINUED | OUTPATIENT
Start: 2024-01-11 | End: 2024-01-11 | Stop reason: HOSPADM

## 2024-01-11 RX ORDER — GLYCOPYRROLATE 0.2 MG/ML
0.2 INJECTION INTRAMUSCULAR; INTRAVENOUS EVERY 4 HOURS PRN
Status: DISCONTINUED | OUTPATIENT
Start: 2024-01-11 | End: 2024-01-11 | Stop reason: HOSPADM

## 2024-01-11 RX ADMIN — SODIUM CHLORIDE, PRESERVATIVE FREE 10 ML: 5 INJECTION INTRAVENOUS at 12:01

## 2024-01-11 RX ADMIN — MORPHINE SULFATE 1 MG: 4 INJECTION, SOLUTION INTRAMUSCULAR; INTRAVENOUS at 04:01

## 2024-01-11 RX ADMIN — OXYBUTYNIN CHLORIDE 5 MG: 5 TABLET ORAL at 08:01

## 2024-01-11 RX ADMIN — SODIUM CHLORIDE, PRESERVATIVE FREE 10 ML: 5 INJECTION INTRAVENOUS at 05:01

## 2024-01-11 RX ADMIN — ATORVASTATIN CALCIUM 10 MG: 10 TABLET, FILM COATED ORAL at 08:01

## 2024-01-11 RX ADMIN — SODIUM CHLORIDE, PRESERVATIVE FREE 10 ML: 5 INJECTION INTRAVENOUS at 02:01

## 2024-01-11 RX ADMIN — AMIODARONE HYDROCHLORIDE 200 MG: 200 TABLET ORAL at 08:01

## 2024-01-11 RX ADMIN — LORAZEPAM 1 MG: 2 INJECTION INTRAMUSCULAR; INTRAVENOUS at 04:01

## 2024-01-11 RX ADMIN — CEFTRIAXONE SODIUM 2 G: 2 INJECTION, POWDER, FOR SOLUTION INTRAMUSCULAR; INTRAVENOUS at 02:01

## 2024-01-11 RX ADMIN — FAMOTIDINE 20 MG: 10 INJECTION, SOLUTION INTRAVENOUS at 08:01

## 2024-01-11 NOTE — NURSING
Nurses Note -- 4 Eyes      1/11/2024   2:25 AM      Skin assessed during: Daily Assessment      [x] No Altered Skin Integrity Present    [x]Prevention Measures Documented      [] Yes- Altered Skin Integrity Present or Discovered   [] LDA Added if Not in Epic (Describe Wound)   [] New Altered Skin Integrity was Present on Admit and Documented in LDA   [] Wound Image Taken    Wound Care Consulted? No    Attending Nurse:  Amira Chua RN/Staff Member:   Lynsey

## 2024-01-11 NOTE — DISCHARGE SUMMARY
[facility] Discharge Summary    Admitting Physician: Francis Rivera III, MD  Attending Physician: Gurdeep Shah MD  Date of Admit: 1/2/2024  Date of Discharge: 1/11/2024    Condition: Fair  Outcome:  Discharged to hospice  DISPOSITION: Hospice/Medical Facility        Discharge Diagnoses:     Patient Active Problem List   Diagnosis    SOB (shortness of breath)    Shock    At high risk for fluid overload    Chest pain    Acute on chronic congestive heart failure    Bacteremia    Pneumonia of right lung due to Streptococcus pneumoniae    Bacteremia due to Streptococcus pneumoniae    Acute respiratory failure with hypoxia    Comfort measures only status       Principal Problem:  SOB (shortness of breath)    Consultants and Procedures:     Consultants:  IP CONSULT TO CARDIOLOGY  IP CONSULT TO NEPHROLOGY  IP CONSULT TO UROLOGY  IP CONSULT TO INFECTIOUS DISEASES  IP CONSULT TO PICC TEAM (NIAS)  IP CONSULT TO REGISTERED DIETITIAN/NUTRITIONIST  WOUND CARE CONSULT  IP CONSULT TO UROLOGY  IP CONSULT TO PALLIATIVE CARE  IP CONSULT TO SOCIAL WORK/CASE MANAGEMENT    Procedures:   * No surgery found *      Brief Admission History:      Patient is a 89-year-old male with PMH pertinent for 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  Patient was originally started on IV antibiotics however during his stay began to become progressively hypotensive.  Patient received 3 L fluid boluses which did not improve patient's blood pressure to a map greater than 65 at which point patient was initiated on Levophed for hemodynamic support.  Of note, patient is visiting [place] and reports that he has been feeling somewhat ill the last 2 days.  Admits to dysuria, fatigue, myalgia,  "shortness a breath.  Reports no chest pain, palpitations, fevers, chills, nausea, vomiting.  ICU was consulted for upgrade.     Hospital Course with Pertinent Findings:      01/01/2024: Admitted to [facility]  01/02/2024: Upgraded to ICU, initiated on Levophed  01/04/2024: PICC line placed   01/05/2024: Suprapubic catheter exchanged    Unfortunately, patient has required persistent BiPAP during his hospitalization with inability to wean down. Patient has been persistently on therapy for his infections with IV abx. Patient and family were met with palliative care, and on 1/11/23 decision was made to pursue palliatve/comfort care. Patient was made DNR and will be discharged to [facility] with [facility] for further care.      At this time, Francis Montero is determined to have maximized benefits of IP hospitalization. he is discharged in stable condition with OP f/u recommendations and instructions. All questions answered, and family verbalized agreement with the POC. Total time spent of DC of 35 minutes.     Discharge physical exam:  Vitals  BP: 127/66  Temp: 97.5 °F (36.4 °C)  Temp Source: Axillary  Pulse: (!) 52  Resp: (!) 25  SpO2: (!) 82 %  Height: 5' 5" (165.1 cm)  Weight: 72.5 kg (159 lb 13.3 oz)        Discharge Medications:         Medication List        CONTINUE taking these medications      calcitRIOL 0.25 MCG Cap  Commonly known as: ROCALTROL     gabapentin 100 MG capsule  Commonly known as: NEURONTIN     metoprolol tartrate 25 MG tablet  Commonly known as: LOPRESSOR     NON FORMULARY MEDICATION            STOP taking these medications      atorvastatin 10 MG tablet  Commonly known as: LIPITOR              Discharge Instructions:         Francis Montero is being discharged Hospice/Medical-Facility.    No discharge procedures on file.     Follow-Up Appointments:        TIME SPENT ON DISCHARGE: 35 minutes    Manny Sawant,   [facility], HO-3  01/11/2024        "

## 2024-01-11 NOTE — CONSULTS
Consults    Patient Name: Francis Montero   MRN: [id number]   Admission Date: 1/2/2024   Hospital Length of Stay: 9   Attending Provider: Gurdeep Shah MD   Consulting Provider: Julieta SCHMITZ  Reason for Consult: Goals of Care  Primary Care Physician:  No, Primary Doctor     Principal Problem: SOB (shortness of breath)       Final diagnoses:  [R06.02] SOB (shortness of breath)  [I50.9] Acute exacerbation of CHF (congestive heart failure)  [J90] Pleural effusion      Assessment/Plan:     I reviewed the patient and family's understanding of the seriousness of the illness and its expected prognosis. We discussed the patient's goals of care and treatment preferences.        Advance Care Planning     Date: 01/11/2024    Today a voluntary meeting took place: other (conference room) ICU    Patient Participation: Patient is unable to participate     Attendees (Name and  Relationship to patient):  son, wife and SAADIA    Staff attendees (Name and  Role): GABRIEL Chahal NP    ACP Conversation (General): Other (specify below) see below     Code Status: DNR; status confirmed/order placed in chart    ACP Documents: None    Goals of care: The family endorses that what is most important right now is to focus on comfort and QOL     Accordingly, we have decided that the best plan to meet the patient's goals includes enrolling in hospice care and pivot to comfort-focused care      Recommendations/  Follow-up tasks: Other (specify below) Met with son, wife and SAADIA who informed that family has decided to seek comfort measures and withdrawal of bipap.  Discussed transition to comfort measures at length and discussed inpatient hospice placement.  Son states that he would like to notify patient's other children who live out of town and out of state.  Discussed with family that if family members would llke to communicate with patient, they could do so through phone if they were unable to visit.  Family expressed interest in transfer to  hospice house--discussed possibility of transfer with bipap or removal of bipap with plan for transfer.  Discussed that I am not sure how long patient would live without current support.  Son informed that he will call his siblings, and we could speak after to determine plan.  Offered support and informed that palliative would continue to follow.          Spoke to son on the phone who states that family would like patient to be removed from bipap with comfort measures in place and then transition to [facility].  States that one of patient's daughters will be arriving in town later today.   Updated medical team.         Recommendations:     Comfort measures in place  Hospice transfer.     Interval History:     Patient remains on bipap at 75%.  I am meeting with family today to discuss goals of care further.        Active Ambulatory Problems     Diagnosis Date Noted    No Active Ambulatory Problems     Resolved Ambulatory Problems     Diagnosis Date Noted    No Resolved Ambulatory Problems     Past Medical History:   Diagnosis Date    CKD (chronic kidney disease)     COPD (chronic obstructive pulmonary disease)     HLD (hyperlipidemia)     HTN (hypertension)     Merkel cell carcinoma     Paroxysmal A-fib     Prostate cancer     PUD (peptic ulcer disease)         Past Surgical History:   Procedure Laterality Date    INGUINAL HERNIA REPAIR      PROSTATE SURGERY      ROTATOR CUFF REPAIR          Review of patient's allergies indicates:  No Known Allergies       Current Facility-Administered Medications:     0.9%  NaCl infusion, , Intravenous, Continuous, Bernardino Ordoñez MD, Stopped at 01/09/24 1723    acetaminophen tablet 1,000 mg, 1,000 mg, Oral, Q6H PRN, Grisel Valentine, FNP, 1,000 mg at 01/09/24 0117    acetaminophen tablet 650 mg, 650 mg, Oral, Q4H PRN, Grisel Valentine FNP, 650 mg at 01/07/24 1447    albuterol nebulizer solution, 10 mg/hr, Nebulization, Continuous, Mendoza Mckeon MD, Last  Rate: 12 mL/hr at 01/02/24 1256, 10 mg/hr at 01/02/24 1256    albuterol-ipratropium 2.5 mg-0.5 mg/3 mL nebulizer solution 3 mL, 3 mL, Nebulization, Q4H PRN, Grisel Valentine FNP, 3 mL at 01/03/24 0229    [COMPLETED] amiodarone tablet 400 mg, 400 mg, Oral, BID, 400 mg at 01/08/24 0928 **FOLLOWED BY** [COMPLETED] amiodarone tablet 200 mg, 200 mg, Oral, BID, 200 mg at 01/11/24 0829 **FOLLOWED BY** amiodarone tablet 200 mg, 200 mg, Oral, Daily, Charles Taveras FNP    atorvastatin tablet 10 mg, 10 mg, Oral, Daily, Grisel Valentine FNP, 10 mg at 01/11/24 0829    calcitRIOL capsule 0.25 mcg, 0.25 mcg, Per NG tube, Daily, Gurdeep Shah MD, 0.25 mcg at 01/10/24 0858    cefTRIAXone (ROCEPHIN) 2 g in dextrose 5 % in water (D5W) 100 mL IVPB (MB+), 2 g, Intravenous, Q24H, Juan Sanchez MD, Stopped at 01/10/24 1258    famotidine (PF) injection 20 mg, 20 mg, Intravenous, Daily, Gurdeep Shah MD, 20 mg at 01/11/24 0829    gabapentin capsule 300 mg, 300 mg, Oral, QHS, Grisel Valentine FNP, 300 mg at 01/10/24 2000    melatonin tablet 6 mg, 6 mg, Oral, Nightly PRN, Grisel Valentine FNP, 6 mg at 01/07/24 2024    metoprolol injection 5 mg, 5 mg, Intravenous, Q5 Min PRN, Wendy Gabriel FNP    morphine injection 1 mg, 1 mg, Intravenous, Q8H PRN, Thomas Palacios MD, 1 mg at 01/10/24 0015    naloxone 0.4 mg/mL injection 0.02 mg, 0.02 mg, Intravenous, PRN, Grisel Valentine FNP    NORepinephrine 8 mg in dextrose 5% 250 mL infusion, 0-3 mcg/kg/min, Intravenous, Continuous, Gurdeep Shah MD, Stopped at 01/06/24 0434    ondansetron injection 4 mg, 4 mg, Intravenous, Q4H PRN, Grisel Valentine FNP    oxybutynin tablet 5 mg, 5 mg, Oral, TID, Arianna Ornelas, AGACNP-BC, 5 mg at 01/11/24 0829    prochlorperazine injection Soln 5 mg, 5 mg, Intravenous, Q6H PRN, Grisel Valentine FNP    simethicone chewable tablet 80 mg, 1 tablet, Oral, QID PRN, Grisel Valentine FNP    sodium  "chloride 0.9% flush 10 mL, 10 mL, Intravenous, PRN, Grisel Valentine, NADIR    sodium chloride 0.9% flush 10 mL, 10 mL, Intravenous, PRN, Manny Sawant, DO    Flushing PICC/Midline Protocol, , , Until Discontinued **AND** sodium chloride 0.9% flush 10 mL, 10 mL, Intravenous, Q6H, 10 mL at 01/11/24 0520 **AND** sodium chloride 0.9% flush 10 mL, 10 mL, Intravenous, PRN, Bernardino Ordoñez MD     acetaminophen, acetaminophen, albuterol-ipratropium, melatonin, metoprolol, morphine, naloxone, ondansetron, prochlorperazine, simethicone, sodium chloride 0.9%, sodium chloride 0.9%, Flushing PICC/Midline Protocol **AND** sodium chloride 0.9% **AND** sodium chloride 0.9%     No family history on file.       Review of Systems   Unable to perform ROS: Acuity of condition            Objective:   /66   Pulse 60   Temp 97.5 °F (36.4 °C) (Axillary)   Resp (!) 26   Ht 5' 5" (1.651 m)   Wt 72.5 kg (159 lb 13.3 oz)   SpO2 (!) 88%   BMI 26.60 kg/m²      Physical Exam   Constitutional: He appears ill.   HENT:   Head: Normocephalic.   Eyes: Pupils are equal, round, and reactive to light.   Cardiovascular: An irregular rhythm present. Pulmonary:      Breath sounds: Rhonchi present.     Abdominal: Soft.   Neurological:   To person and place          Review of Symptoms  Review of Symptoms      Symptom Assessment (ESAS 0-10 Scale)  Pain:  0  Dyspnea:  0  Anxiety:  0  Nausea:  0  Depression:  0  Anorexia:  0  Fatigue:  0  Insomnia:  0  Restlessness:  0  Agitation:  0         Psychosocial/Cultural:   See Palliative Psychosocial Note: Yes  **Primary  to Follow**  Palliative Care  Consult: No      Advance Care Planning   Advance Directives:   Goals of Care: What is most important right now is to focus on comfort and QOL . Accordingly, we have decided that the best plan to meet the patient's goals includes continuing with treatment.          PAINAD: NA    Caregiver burden formerly assessed: " Yes      No results displayed because visit has over 200 results.               > 50% of 45 min of encounter was spent in chart review, face to face discussion of goals of care, symptom assessment, coordination of care and emotional support.    20 minuts of ACP discussion    Julieta SCHMITZ, [facility]  Palliative Medicine  [facility] - Observation Unit

## 2024-01-11 NOTE — PLAN OF CARE
Problem: Adult Inpatient Plan of Care  Goal: Plan of Care Review  Outcome: Ongoing, Progressing  Goal: Patient-Specific Goal (Individualized)  Outcome: Ongoing, Progressing  Goal: Absence of Hospital-Acquired Illness or Injury  Outcome: Ongoing, Progressing  Goal: Optimal Comfort and Wellbeing  Outcome: Ongoing, Progressing  Goal: Readiness for Transition of Care  Outcome: Ongoing, Progressing     Problem: Skin Injury Risk Increased  Goal: Skin Health and Integrity  Outcome: Ongoing, Progressing     Problem: Fall Injury Risk  Goal: Absence of Fall and Fall-Related Injury  Outcome: Ongoing, Progressing     Problem: Infection  Goal: Absence of Infection Signs and Symptoms  Outcome: Ongoing, Progressing     Problem: Impaired Wound Healing  Goal: Optimal Wound Healing  Outcome: Ongoing, Progressing     Problem: Coping Ineffective  Goal: Effective Coping  Outcome: Ongoing, Progressing

## 2024-01-11 NOTE — PLAN OF CARE
01/11/24 1507   Discharge Reassessment   Assessment Type Discharge Planning Reassessment   Did the patient's condition or plan change since previous assessment? Yes   Discharge Plan A Inpatient Hospice   Discharge Plan B Inpatient Hospice   DME Needed Upon Discharge  none   Transition of Care Barriers None   Post-Acute Status   Post-Acute Authorization Hospice   Discharge Delays None known at this time     Palliative spoke to family. Chose [facility] for inpatient hospice. Called Constance with [facility] and sent referral via [facility]. Printed DNR order and face sheet for transport with the patient. Constance will meet with family for transfer today.

## 2024-01-11 NOTE — CARE UPDATE
Noted plans to transition goals of care to comfort care. Our thoughts are with Mr. Montero and his family in this difficult time. We will signoff, please call us should any change occur or any questions or concerns arise.     Juan Sanchez MD  Infectious Disease  [facility]

## 2024-01-11 NOTE — PROGRESS NOTES
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  Patient was originally started on IV antibiotics however during his stay began to become progressively hypotensive.  Patient received 3 L fluid boluses which did not improve patient's blood pressure to a map greater than 65 at which point patient was initiated on Levophed for hemodynamic support.  Of note, patient is visiting [place] and reports that he has been feeling somewhat ill the last 2 days.  Admits to dysuria, fatigue, myalgia, shortness a breath.  Reports no chest pain, palpitations, fevers, chills, nausea, vomiting.  ICU was consulted for upgrade.    Hospital Course/Significant events:  01/01/2024: Admitted to [facility]  01/02/2024: Upgraded to ICU, initiated on Levophed  01/04/2024: PICC line placed   01/05/2024: Suprapubic catheter exchanged    24 Hour Interval History:  No acute events occurred overnight. Vital signs remained stable and patient is afebrile. Continue with daily Rocephin. Remains on BiPAP. Palliative team held discussion with family; plan is for comfort care. Son will be in town today.       Past Medical History:   Diagnosis Date    CKD (chronic kidney disease)     COPD (chronic obstructive pulmonary disease)     HLD (hyperlipidemia)     HTN (hypertension)     Merkel cell carcinoma     Paroxysmal A-fib     Prostate cancer     PUD  (peptic ulcer disease)      Past Surgical History:   Procedure Laterality Date    INGUINAL HERNIA REPAIR      PROSTATE SURGERY      ROTATOR CUFF REPAIR       Social History     Socioeconomic History    Marital status:      Social Determinants of Health     Housing Stability: Unknown (1/3/2024)    Housing Stability Vital Sign     Unable to Pay for Housing in the Last Year: No     Current Outpatient Medications   Medication Instructions    atorvastatin (LIPITOR) 10 mg, Oral, Daily    calcitRIOL (ROCALTROL) 0.25 mcg, Oral, Daily, Takes every other day    gabapentin (NEURONTIN) 300 mg, Oral, Nightly    metoprolol tartrate (LOPRESSOR) 25 mg, Oral, 2 times daily    NON FORMULARY MEDICATION 850 mg, Oral, Daily, Umary     Current Inpatient Medications   amiodarone  200 mg Oral BID    Followed by    amiodarone  200 mg Oral Daily    atorvastatin  10 mg Oral Daily    calcitRIOL  0.25 mcg Per NG tube Daily    cefTRIAXone (ROCEPHIN) IVPB  2 g Intravenous Q24H    famotidine (PF)  20 mg Intravenous Daily    gabapentin  300 mg Oral QHS    oxybutynin  5 mg Oral TID    sodium chloride 0.9%  10 mL Intravenous Q6H     Current Intravenous Infusions   sodium chloride 0.9% Stopped (01/09/24 1723)    albuterol 0.083% 10 mg/hr (01/02/24 1256)    NORepinephrine bitartrate-D5W Stopped (01/06/24 0434)       Review of Systems   Reason unable to perform ROS: due to critical illness.      Objective:       Intake/Output Summary (Last 24 hours) at 1/11/2024 0433  Last data filed at 1/11/2024 0000  Gross per 24 hour   Intake 1048.2 ml   Output 1700 ml   Net -651.8 ml       Vital Signs (Most Recent):  Temp: 97.5 °F (36.4 °C) (01/11/24 0400)  Pulse: 61 (01/11/24 0400)  Resp: 12 (01/11/24 0400)  BP: 127/62 (01/11/24 0400)  SpO2: (!) 92 % (01/11/24 0400)  Body mass index is 26.6 kg/m².  Weight: 72.5 kg (159 lb 13.3 oz) Vital Signs (24h Range):  Temp:  [97.3 °F (36.3 °C)-98.1 °F (36.7 °C)] 97.5 °F (36.4 °C)  Pulse:  [49-79] 61  Resp:  [12-27]  12  SpO2:  [80 %-96 %] 92 %  BP: ()/(49-63) 127/62     Physical Exam  Constitutional:       General: He is not in acute distress.     Appearance: He is ill-appearing.      Comments: Pulling at mitts and rolling around in bed on exam.   HENT:      Head:      Comments: CPAP in place. PICC line placed, dressing intact.   Cardiovascular:      Rate and Rhythm: Tachycardia present.      Pulses: Normal pulses.      Heart sounds: Murmur heard.      No friction rub. No gallop.      Comments: Systolic murmur noted  Pulmonary:      Effort: Pulmonary effort is normal.      Breath sounds: No stridor. Rhonchi and rales present. No wheezing.   Abdominal:      General: Bowel sounds are normal.      Palpations: Abdomen is soft.      Tenderness: There is no abdominal tenderness. There is no guarding.   Musculoskeletal:         General: No swelling.      Right lower leg: No edema.      Left lower leg: No edema.   Neurological:      General: No focal deficit present.      Mental Status: He is alert and oriented to person, place, and time.       Lines/Drains/Airways       Peripherally Inserted Central Catheter Line  Duration             PICC Triple Lumen 01/04/24 1725 left basilic 6 days              Drain  Duration                  NG/OG Tube 01/05/24 1500 [place] sump 16 Fr. Left nostril 5 days         Suprapubic Catheter 01/05/24 1720 24 Fr. 5 days              Peripheral Intravenous Line  Duration                  Peripheral IV - Single Lumen 01/04/24 1459 20 G Anterior;Left;Proximal Forearm 6 days                    Significant Labs:  Lab Results   Component Value Date    WBC 17.66 (H) 01/11/2024    HGB 11.2 (L) 01/11/2024    HCT 36.7 (L) 01/11/2024    MCV 86.4 01/11/2024     01/11/2024     BMP  Lab Results   Component Value Date     (H) 01/09/2024    K 3.8 01/09/2024    CHLORIDE 120 (H) 01/09/2024    CO2 23 01/09/2024    BUN 94.5 (H) 01/09/2024    CREATININE 2.21 (H) 01/09/2024    CALCIUM 9.1 01/09/2024      ABG  Recent Labs   Lab 01/07/24  2330   PH 7.340*   PO2 64.0*   PCO2 43.0   HCO3 23.2   POCBASEDEF -2.50*       Mechanical Ventilation Support:  Oxygen Concentration (%): 75 (01/11/24 0400)    Significant Imaging:  Imaging Results              X-Ray Chest AP Portable (Final result)  Result time 01/02/24 21:00:06      Final result by Darrius Mcpherson MD (01/02/24 21:00:06)                   Impression:      Changes most consistent with pulmonary edema and a large right-sided pleural effusion.  This appears grossly stable from the previous exam      Electronically signed by: Darrius Mcpherson MD  Date:    01/02/2024  Time:    21:00               Narrative:    EXAMINATION:  XR CHEST AP PORTABLE    CLINICAL HISTORY:  Shortness of breath    TECHNIQUE:  Single frontal view of the chest was performed.    COMPARISON:  01/02/2024    FINDINGS:  There is significant opacification of the right hemithorax cannot rule out a large effusion.  There are calcified pleural plaques suggestive of previous X best is exposure.  There are increased markings bilaterally most consistent with pulmonary edema                                       US Retroperitoneal Complete (Final result)  Result time 01/02/24 15:46:53      Final result by Soren Miranda MD (01/02/24 15:46:53)                   Impression:      1. No hydronephrosis.  2. Medical renal disease.      Electronically signed by: Soren Miranda  Date:    01/02/2024  Time:    15:46               Narrative:    EXAMINATION:  US RETROPERITONEAL COMPLETE    CLINICAL HISTORY:  elevated BUN/Creat;    COMPARISON:  None.    FINDINGS:  Grayscale and color Doppler sonographic evaluation of the kidneys and urinary bladder.    The right kidney measures 8 cm. The left kidney measures 8 cm.   No hydronephrosis.  Heterogeneous renal parenchymal echogenicity with loss of corticomedullary differentiation.    Bladder is not visualized.                                       CT Head Without Contrast  (Final result)  Result time 01/02/24 15:12:29      Final result by Alejandrina Rodriguez MD (01/02/24 15:12:29)                   Impression:      1. No acute intracranial abnormality.  2. Chronic microvascular ischemic changes.      Electronically signed by: Alejandrina Rodriguez  Date:    01/02/2024  Time:    15:12               Narrative:    EXAMINATION:  CT HEAD WITHOUT CONTRAST    CLINICAL HISTORY:  Mental status change, unknown cause;    TECHNIQUE:  Axial scans were obtained from skull base to the vertex.    Coronal and sagittal reconstructions obtained from the axial data.    Automatic exposure control was utilized to limit radiation dose.    Contrast: None    Radiation Dose:    Total DLP: 1029 mGy*cm    COMPARISON:  None    FINDINGS:  There is no acute intracranial hemorrhage or edema.  There is a small area of encephalomalacia in the left cerebellum.    There is no mass effect or midline shift.  There is diffuse parenchymal volume loss.  The basal cisterns are patent. There is no abnormal extra-axial fluid collection.  Carotid artery calcifications are noted.    The calvarium and skull base are intact.  There are bilateral mastoid effusions, right greater than left.                                       X-Ray Chest 1 View (Final result)  Result time 01/02/24 10:38:49      Final result by Eligio Shaikh MD (01/02/24 10:38:49)                   Impression:      Changes suggestive of pulmonary vascular congestion and cardiac decompensation.    Slightly more confluent opacities at the right base infiltrate cannot be completely excluded.    Right-sided pleural effusion      Electronically signed by: Eligio Shaikh  Date:    01/02/2024  Time:    10:38               Narrative:    EXAMINATION:  XR CHEST 1 VIEW    CPT 45288    CLINICAL HISTORY:  SOB;    FINDINGS:  Examination reveals mediastinal silhouette to be within normal limits cardiac silhouette is not enlarged there is increase in interstitial and pulmonary  vascular markings indicating some degree of pulmonary vascular congestion and cardiac decompensation.    Slightly more confluent opacities identified at the right base superimposed infiltrate can not be completely excluded.    There is blunting of the right costophrenic angle representing a right-sided pleural effusion    No other focal consolidative changes                                   I have reviewed the pertinent imaging within the past 24 hours.    Assessment/Plan:     Assessment  Septic Shock - improving  Acute Cystitis without hematuria  -Hx of urinary retension   - hx of urethral stent  -initial urine culture positive for klebsiella pneumoniae  Community Acquired Pneumonia  Bacteremia -strep pneumoniae  Acute Hypoxic on chronic hypercarbic respiratory failure  - hx of COPD  - hx of asbestosis, pulmonary fibrosis?  Right pleural effusion  Acute renal failure  - hx of CKD with unknown baseline  Metabolic Acidosis  - lactic acidosis, respiratory compensation  NSTEMI  HFpEF, decompensated EF 60% with diastolic failure on most recent TTE  Hyperkalemia - resolved  Hypomagnesemia - resolved  Paroxysmal Afib  Hx of prostate cancer  Hx of merkel cell carcinoma      Plan  -Continue critical care level management at this time.   -Currently off pressors, maintain MAP goal >65   -Continue with supplement oxygen, can titrate for goal SpO2 88-92% and wean as tolerated   -Continue Nebulized treatments for wheezing  -Cardiology signed off, appreciate their assistance   -Discontinue heparin due to thrombocytopenia    -Amiodarone oral taper    -Consider digoxin if further HR control if needed with caution due to renal dysfunction    -Hold beta blocker given hypotension, resume with BP improves    -Tolerated Lasix trials   -ID following, appreciate recommendations   -Awaiting blood cultures  -Recommended CT chest   -Retroperitoneal U/S negative for hydronephrosis  -Continue Merrem 1/5/24 as there was no improvement with  Rocephin initially; possible transition back   -Initial BC done on 1/2/24 + Strep Pneumo 2/2 on ; sensitive to gent, meropenem and zosyn  -Repeat blood cultures x 2 collected on 1/5/24 pending   -Repeat urine culture with yeast seen   -Initiated on Rocephin 1/9  -Urology consulted and signed off on 1/5/24, appreciate their assistance  -low suspicion that sepsis is from his catheter  -Awaiting CT scan of abdomen to r/o abscess   -Supra pubic bladder catheter exchanged 1/5/24   -Stated ditropan 1/8  -Nephrology evaluated pt and signed off at this time. Appreciate their assistance  -Patient's family discussing potential transferred to be closer to where they live [facility], we will discuss with case management in the morning if this is possible  -Family elected to change pt's code status to chemical code only.  -Will initiate comfort care      CODE STATUS: Partial Code - chemical code   Consults: Urology, Nephrology, Cardiology  Access: Peripheral  Drains: suprapubic catheter replaced 1/5/24   Diet: Diet NPO  Fluids: none  Pressors: None   Oxygenation: CPAP w Fio2 70  DVT Prophylaxis: Heparin  GI Prophylaxis: none  I&Os: pending 01/10 0701 - 01/11 0700  In: 651.1 [I.V.:121.1]  Out: 1000 [Urine:1000]        32 minutes of critical care was time spent personally by me on the following activities: development of treatment plan with patient or surrogate and bedside caregivers, discussions with consultants, evaluation of patient's response to treatment, examination of patient, ordering and performing treatments and interventions, ordering and review of laboratory studies, ordering and review of radiographic studies, pulse oximetry, re-evaluation of patient's condition.  This critical care time did not overlap with that of any other provider or involve time for any procedures.     Alexander Andrade MD  Pulmonary Critical Care Medicine  [facility] - [address] [place] [address]   DOS: 01/11/2024

## 2024-01-12 NOTE — NURSING
[facility] arrived to transport patient to [facility] at 2126. Family at bedside.

## 2024-01-13 LAB
BACTERIA BLD CULT: NORMAL
BACTERIA BLD CULT: NORMAL

## 2024-01-19 NOTE — PHYSICIAN QUERY
PT Name: Francis Montero  MR #: [id number]     DOCUMENTATION CLARIFICATION      CDS/: Violet Stewart RN          Contact Information: [email or web address]   This form is a permanent document in the medical record.     Query Date: January 19, 2024    By submitting this query, we are merely seeking further clarification of documentation to reflect the severity of illness of your patient. Please utilize your independent clinical judgment when addressing the question(s) below.    The Medical Record contains the following:     Indicators   Supporting Clinical Findings Location in Medical Record     x Documentation of condition Suprapubic catheter in place site clean and dry there is a penile clamp in place as well     UTI with ureteral stent     UTI with chronic ureteral stent and SPT - Cultures with ESBL klebsiella  1/2 H&P      1/3 Urology consult     1/6 Urology PN     x Urinary Device, Catheter Patient currently has a suprapubic catheter in place  status post prostate cancer with prostatectomy and radiation about 20 years ago with a chronic ureteral stent since receiving radiation with exchange every 3 months.     current stent was placed about 2 months ago  1/2 H&P          1/3 Urology consult     x Lab Value(s)  01/02/24 10:42 01/02/24 16:37 01/03/24 02:18   WBC 13.89 (H) 7.77 8.60    Lab results     x UA Results  01/02/24 13:05 01/05/24 18:00   Color, UA Yellow Light-Yellow   Appearance, UA Turbid ! Turbid !   Occult Blood UA 3+ ! 2+ !   Leukocytes,  ! 250 !   RBC, UA 50-99 ! 50-99 !   WBC, UA 51-99 ! 21-50 !   Bacteria, UA Many ! Trace   WBC Clumps, UA Few !     Lab results      x Cultures Blood culture:  Streptococcus pneumoniae     Urine culture:   >/= 100,000 colonies/ml Klebsiella pneumoniae ssp pneumoniae      Urine culture:  10,000 - 25,000 colonies/ml Candida parapsilosis 1/2 microbiology                1/5 microbiology     x Treatment/Medication azithromycin 500 mg IVPB x1   ceFEPIme  (MAXIPIME) 500 mg IVPB q24hrs   vancomycin 1.25 g IVPB q8hrs ½-1/5   cefTRIAXone (ROCEPHIN) 2 g IVPB q24hrs ½-1/11  meropenem (MERREM) 1 g IVPB q8hrs ¼-1/8    Urology exchanged suprapubic catheter yesterday  Suprapubic tube change successful.  Urine draining clear and patient comfortable.  No acute urologic needs perceived.  1/2 medications  1/2-1/4 medications  1/2-1/5 medications  1/2-1/11 medications  1/4-1/8 medications    1/6 CCM PN     x Other Repeat urine culture after SBT exchange with no MDR GNR  should we start seeing continued worsening, may need to consider restarting medication covering the urine (since we have presence of ureteral stent) although Strep pneumoniae is most likely culprit in current clinical condition   1/9 ID PN       Provider, please clarify if there is any clinical correlation between UTI and Suprapubic catheter.  [  X ] Due to or associated with each other   [   ] Unrelated to each other   [   ] Other explanation (please specify): _____________   [   ] Clinically undetermined       Please document in your progress notes daily for the duration of treatment until resolved, and include in your discharge summary.    Form No. [id number]

## 2024-04-15 PROBLEM — J96.01 ACUTE RESPIRATORY FAILURE WITH HYPOXIA: Status: RESOLVED | Noted: 2024-01-09 | Resolved: 2024-04-15

## 2024-04-15 PROBLEM — J13 PNEUMONIA OF RIGHT LUNG DUE TO STREPTOCOCCUS PNEUMONIAE: Status: RESOLVED | Noted: 2024-01-09 | Resolved: 2024-04-15
